# Patient Record
Sex: FEMALE | Race: WHITE | Employment: OTHER | URBAN - NONMETROPOLITAN AREA
[De-identification: names, ages, dates, MRNs, and addresses within clinical notes are randomized per-mention and may not be internally consistent; named-entity substitution may affect disease eponyms.]

---

## 2017-02-24 DIAGNOSIS — R11.0 NAUSEA IN ADULT: ICD-10-CM

## 2017-02-24 DIAGNOSIS — R60.0 BILATERAL EDEMA OF LOWER EXTREMITY: ICD-10-CM

## 2017-02-24 DIAGNOSIS — I10 ESSENTIAL HYPERTENSION: ICD-10-CM

## 2017-02-24 DIAGNOSIS — E78.2 MIXED HYPERLIPIDEMIA: Primary | ICD-10-CM

## 2017-02-27 RX ORDER — POTASSIUM CHLORIDE 1500 MG/1
TABLET, EXTENDED RELEASE ORAL
Qty: 30 TABLET | Refills: 5 | Status: SHIPPED | OUTPATIENT
Start: 2017-02-27 | End: 2017-09-11 | Stop reason: SDUPTHER

## 2017-02-27 RX ORDER — ATORVASTATIN CALCIUM 40 MG/1
TABLET, FILM COATED ORAL
Qty: 30 TABLET | Refills: 5 | Status: SHIPPED | OUTPATIENT
Start: 2017-02-27 | End: 2017-09-11 | Stop reason: SDUPTHER

## 2017-02-27 RX ORDER — HYDROCHLOROTHIAZIDE 25 MG/1
TABLET ORAL
Qty: 30 TABLET | Refills: 5 | Status: SHIPPED | OUTPATIENT
Start: 2017-02-27 | End: 2017-09-11 | Stop reason: SDUPTHER

## 2017-02-27 RX ORDER — ONDANSETRON 4 MG/1
TABLET, FILM COATED ORAL
Qty: 40 TABLET | Refills: 0 | Status: SHIPPED | OUTPATIENT
Start: 2017-02-27 | End: 2017-10-06 | Stop reason: SDUPTHER

## 2017-03-20 DIAGNOSIS — K21.9 GASTROESOPHAGEAL REFLUX DISEASE WITHOUT ESOPHAGITIS: ICD-10-CM

## 2017-03-20 DIAGNOSIS — K44.9 HIATAL HERNIA: ICD-10-CM

## 2017-03-20 RX ORDER — RANITIDINE 150 MG/1
150 TABLET ORAL 2 TIMES DAILY
Qty: 60 TABLET | Refills: 5 | Status: SHIPPED | OUTPATIENT
Start: 2017-03-20 | End: 2017-10-06 | Stop reason: DRUGHIGH

## 2017-03-31 ENCOUNTER — HOSPITAL ENCOUNTER (OUTPATIENT)
Dept: GENERAL RADIOLOGY | Age: 47
Discharge: HOME | End: 2017-03-31
Payer: MEDICARE

## 2017-03-31 ENCOUNTER — HOSPITAL ENCOUNTER (OUTPATIENT)
Age: 47
Discharge: HOME | End: 2017-03-31
Payer: MEDICARE

## 2017-03-31 ENCOUNTER — OFFICE VISIT (OUTPATIENT)
Dept: FAMILY MEDICINE CLINIC | Age: 47
End: 2017-03-31
Payer: MEDICARE

## 2017-03-31 VITALS
BODY MASS INDEX: 48.21 KG/M2 | SYSTOLIC BLOOD PRESSURE: 132 MMHG | HEIGHT: 62 IN | WEIGHT: 262 LBS | DIASTOLIC BLOOD PRESSURE: 82 MMHG | HEART RATE: 76 BPM

## 2017-03-31 DIAGNOSIS — R22.31 MASS OF WRIST, RIGHT: ICD-10-CM

## 2017-03-31 DIAGNOSIS — G43.809 OTHER MIGRAINE WITHOUT STATUS MIGRAINOSUS, NOT INTRACTABLE: Primary | ICD-10-CM

## 2017-03-31 DIAGNOSIS — E78.2 MIXED HYPERCHOLESTEROLEMIA AND HYPERTRIGLYCERIDEMIA: ICD-10-CM

## 2017-03-31 DIAGNOSIS — R52 PAIN: ICD-10-CM

## 2017-03-31 DIAGNOSIS — K44.9 HIATAL HERNIA: ICD-10-CM

## 2017-03-31 DIAGNOSIS — E66.01 MORBID OBESITY DUE TO EXCESS CALORIES (HCC): ICD-10-CM

## 2017-03-31 DIAGNOSIS — K21.9 GASTROESOPHAGEAL REFLUX DISEASE WITHOUT ESOPHAGITIS: ICD-10-CM

## 2017-03-31 PROCEDURE — G8417 CALC BMI ABV UP PARAM F/U: HCPCS | Performed by: INTERNAL MEDICINE

## 2017-03-31 PROCEDURE — 73110 X-RAY EXAM OF WRIST: CPT

## 2017-03-31 PROCEDURE — 1036F TOBACCO NON-USER: CPT | Performed by: INTERNAL MEDICINE

## 2017-03-31 PROCEDURE — G8427 DOCREV CUR MEDS BY ELIG CLIN: HCPCS | Performed by: INTERNAL MEDICINE

## 2017-03-31 PROCEDURE — G8484 FLU IMMUNIZE NO ADMIN: HCPCS | Performed by: INTERNAL MEDICINE

## 2017-03-31 PROCEDURE — 99214 OFFICE O/P EST MOD 30 MIN: CPT | Performed by: INTERNAL MEDICINE

## 2017-03-31 RX ORDER — PHENTERMINE HYDROCHLORIDE 37.5 MG/1
37.5 CAPSULE ORAL EVERY MORNING
Qty: 30 CAPSULE | Refills: 0 | Status: SHIPPED | OUTPATIENT
Start: 2017-03-31 | End: 2017-04-25 | Stop reason: SDUPTHER

## 2017-03-31 ASSESSMENT — ENCOUNTER SYMPTOMS
SORE THROAT: 0
DIARRHEA: 0
RHINORRHEA: 0
CHEST TIGHTNESS: 0
BLOOD IN STOOL: 0
NAUSEA: 0
COUGH: 0
ABDOMINAL PAIN: 0
SHORTNESS OF BREATH: 0
CONSTIPATION: 0

## 2017-04-25 ENCOUNTER — OFFICE VISIT (OUTPATIENT)
Dept: FAMILY MEDICINE CLINIC | Age: 47
End: 2017-04-25
Payer: MEDICARE

## 2017-04-25 VITALS
DIASTOLIC BLOOD PRESSURE: 74 MMHG | WEIGHT: 254 LBS | HEIGHT: 62 IN | HEART RATE: 78 BPM | BODY MASS INDEX: 46.74 KG/M2 | SYSTOLIC BLOOD PRESSURE: 122 MMHG

## 2017-04-25 DIAGNOSIS — E66.01 MORBID OBESITY DUE TO EXCESS CALORIES (HCC): Primary | ICD-10-CM

## 2017-04-25 DIAGNOSIS — R14.0 ABDOMINAL BLOATING: ICD-10-CM

## 2017-04-25 LAB
BILIRUBIN, POC: NORMAL
BLOOD URINE, POC: NORMAL
CLARITY, POC: CLEAR
COLOR, POC: NORMAL
GLUCOSE URINE, POC: NORMAL
KETONES, POC: NORMAL
LEUKOCYTE EST, POC: NORMAL
NITRITE, POC: NORMAL
PH, POC: 6
PROTEIN, POC: NORMAL
SPECIFIC GRAVITY, POC: 1.02
UROBILINOGEN, POC: 0.2

## 2017-04-25 PROCEDURE — 81002 URINALYSIS NONAUTO W/O SCOPE: CPT | Performed by: INTERNAL MEDICINE

## 2017-04-25 PROCEDURE — 99213 OFFICE O/P EST LOW 20 MIN: CPT | Performed by: INTERNAL MEDICINE

## 2017-04-25 PROCEDURE — G8427 DOCREV CUR MEDS BY ELIG CLIN: HCPCS | Performed by: INTERNAL MEDICINE

## 2017-04-25 PROCEDURE — G8417 CALC BMI ABV UP PARAM F/U: HCPCS | Performed by: INTERNAL MEDICINE

## 2017-04-25 PROCEDURE — 1036F TOBACCO NON-USER: CPT | Performed by: INTERNAL MEDICINE

## 2017-04-25 RX ORDER — PHENTERMINE HYDROCHLORIDE 37.5 MG/1
37.5 CAPSULE ORAL EVERY MORNING
Qty: 30 CAPSULE | Refills: 0 | Status: SHIPPED | OUTPATIENT
Start: 2017-04-25 | End: 2017-05-25 | Stop reason: SDUPTHER

## 2017-04-25 ASSESSMENT — ENCOUNTER SYMPTOMS
COUGH: 0
DIARRHEA: 0
NAUSEA: 0
CONSTIPATION: 0
RHINORRHEA: 0
ABDOMINAL PAIN: 0
SORE THROAT: 0
BLOOD IN STOOL: 0
CHEST TIGHTNESS: 0
SHORTNESS OF BREATH: 0

## 2017-05-25 ENCOUNTER — OFFICE VISIT (OUTPATIENT)
Dept: FAMILY MEDICINE CLINIC | Age: 47
End: 2017-05-25
Payer: MEDICARE

## 2017-05-25 VITALS
HEIGHT: 62 IN | HEART RATE: 78 BPM | BODY MASS INDEX: 46.19 KG/M2 | WEIGHT: 251 LBS | DIASTOLIC BLOOD PRESSURE: 74 MMHG | SYSTOLIC BLOOD PRESSURE: 124 MMHG

## 2017-05-25 DIAGNOSIS — E66.01 MORBID OBESITY DUE TO EXCESS CALORIES (HCC): ICD-10-CM

## 2017-05-25 PROCEDURE — 1036F TOBACCO NON-USER: CPT | Performed by: INTERNAL MEDICINE

## 2017-05-25 PROCEDURE — 99213 OFFICE O/P EST LOW 20 MIN: CPT | Performed by: INTERNAL MEDICINE

## 2017-05-25 PROCEDURE — G8427 DOCREV CUR MEDS BY ELIG CLIN: HCPCS | Performed by: INTERNAL MEDICINE

## 2017-05-25 PROCEDURE — G8417 CALC BMI ABV UP PARAM F/U: HCPCS | Performed by: INTERNAL MEDICINE

## 2017-05-25 RX ORDER — PHENTERMINE HYDROCHLORIDE 37.5 MG/1
37.5 CAPSULE ORAL EVERY MORNING
Qty: 30 CAPSULE | Refills: 0 | Status: SHIPPED | OUTPATIENT
Start: 2017-05-25 | End: 2017-06-24

## 2017-05-25 ASSESSMENT — PATIENT HEALTH QUESTIONNAIRE - PHQ9
SUM OF ALL RESPONSES TO PHQ9 QUESTIONS 1 & 2: 2
2. FEELING DOWN, DEPRESSED OR HOPELESS: 1
SUM OF ALL RESPONSES TO PHQ QUESTIONS 1-9: 2
1. LITTLE INTEREST OR PLEASURE IN DOING THINGS: 1

## 2017-05-25 ASSESSMENT — ENCOUNTER SYMPTOMS
RHINORRHEA: 0
COUGH: 0
BLOOD IN STOOL: 0
SORE THROAT: 0
CONSTIPATION: 0
CHEST TIGHTNESS: 0
SHORTNESS OF BREATH: 0
ABDOMINAL PAIN: 0
DIARRHEA: 0
NAUSEA: 0

## 2017-08-04 ENCOUNTER — TELEPHONE (OUTPATIENT)
Dept: FAMILY MEDICINE CLINIC | Age: 47
End: 2017-08-04

## 2017-08-04 DIAGNOSIS — J40 BRONCHITIS: ICD-10-CM

## 2017-08-04 RX ORDER — AZITHROMYCIN 250 MG/1
TABLET, FILM COATED ORAL
Qty: 1 PACKET | Refills: 0 | Status: SHIPPED | OUTPATIENT
Start: 2017-08-04 | End: 2017-08-14

## 2017-08-14 RX ORDER — SERTRALINE HYDROCHLORIDE 100 MG/1
200 TABLET, FILM COATED ORAL DAILY
Qty: 30 TABLET | Refills: 0 | Status: SHIPPED | OUTPATIENT
Start: 2017-08-14 | End: 2017-08-28 | Stop reason: SDUPTHER

## 2017-08-28 RX ORDER — SERTRALINE HYDROCHLORIDE 100 MG/1
200 TABLET, FILM COATED ORAL DAILY
Qty: 30 TABLET | Refills: 0 | Status: SHIPPED | OUTPATIENT
Start: 2017-08-28 | End: 2017-09-11 | Stop reason: SDUPTHER

## 2017-09-11 DIAGNOSIS — I10 ESSENTIAL HYPERTENSION: ICD-10-CM

## 2017-09-11 DIAGNOSIS — R60.0 BILATERAL EDEMA OF LOWER EXTREMITY: ICD-10-CM

## 2017-09-11 DIAGNOSIS — E78.2 MIXED HYPERLIPIDEMIA: ICD-10-CM

## 2017-09-12 RX ORDER — ATORVASTATIN CALCIUM 40 MG/1
TABLET, FILM COATED ORAL
Qty: 30 TABLET | Refills: 5 | Status: SHIPPED | OUTPATIENT
Start: 2017-09-12 | End: 2018-03-19 | Stop reason: SDUPTHER

## 2017-09-12 RX ORDER — SERTRALINE HYDROCHLORIDE 100 MG/1
TABLET, FILM COATED ORAL
Qty: 30 TABLET | Refills: 5 | Status: SHIPPED | OUTPATIENT
Start: 2017-09-12 | End: 2018-07-05 | Stop reason: SDUPTHER

## 2017-09-12 RX ORDER — HYDROCHLOROTHIAZIDE 25 MG/1
TABLET ORAL
Qty: 30 TABLET | Refills: 5 | Status: SHIPPED | OUTPATIENT
Start: 2017-09-12 | End: 2018-05-06 | Stop reason: SDUPTHER

## 2017-09-12 RX ORDER — POTASSIUM CHLORIDE 1500 MG/1
TABLET, EXTENDED RELEASE ORAL
Qty: 30 TABLET | Refills: 5 | Status: SHIPPED | OUTPATIENT
Start: 2017-09-12 | End: 2018-05-06 | Stop reason: SDUPTHER

## 2017-10-06 ENCOUNTER — HOSPITAL ENCOUNTER (OUTPATIENT)
Age: 47
Discharge: HOME | End: 2017-10-06
Payer: MEDICARE

## 2017-10-06 ENCOUNTER — OFFICE VISIT (OUTPATIENT)
Dept: FAMILY MEDICINE CLINIC | Age: 47
End: 2017-10-06
Payer: MEDICARE

## 2017-10-06 VITALS
SYSTOLIC BLOOD PRESSURE: 124 MMHG | WEIGHT: 262 LBS | HEART RATE: 70 BPM | HEIGHT: 62 IN | DIASTOLIC BLOOD PRESSURE: 74 MMHG | BODY MASS INDEX: 48.21 KG/M2

## 2017-10-06 DIAGNOSIS — E78.2 MIXED HYPERCHOLESTEROLEMIA AND HYPERTRIGLYCERIDEMIA: ICD-10-CM

## 2017-10-06 DIAGNOSIS — R11.0 NAUSEA IN ADULT: ICD-10-CM

## 2017-10-06 DIAGNOSIS — K44.9 HIATAL HERNIA: ICD-10-CM

## 2017-10-06 DIAGNOSIS — R10.13 EPIGASTRIC ABDOMINAL PAIN: Primary | ICD-10-CM

## 2017-10-06 DIAGNOSIS — F41.8 DEPRESSION WITH ANXIETY: ICD-10-CM

## 2017-10-06 DIAGNOSIS — K21.9 GASTROESOPHAGEAL REFLUX DISEASE WITHOUT ESOPHAGITIS: ICD-10-CM

## 2017-10-06 LAB
ALBUMIN SERPL-MCNC: 3.6 G/DL (ref 3.5–5.2)
ALBUMIN/GLOBULIN RATIO: ABNORMAL (ref 1–2.5)
ALP BLD-CCNC: 116 U/L (ref 35–104)
ALT SERPL-CCNC: 17 U/L (ref 5–33)
ANION GAP SERPL CALCULATED.3IONS-SCNC: 11 MMOL/L (ref 9–17)
AST SERPL-CCNC: 24 U/L
BILIRUB SERPL-MCNC: 0.43 MG/DL (ref 0.3–1.2)
BUN BLDV-MCNC: 10 MG/DL (ref 6–20)
BUN/CREAT BLD: 17 (ref 9–20)
CALCIUM SERPL-MCNC: 9 MG/DL (ref 8.6–10.4)
CHLORIDE BLD-SCNC: 103 MMOL/L (ref 98–107)
CHOLESTEROL/HDL RATIO: 2.9
CHOLESTEROL: 181 MG/DL
CO2: 26 MMOL/L (ref 20–31)
CREAT SERPL-MCNC: 0.59 MG/DL (ref 0.5–0.9)
GFR AFRICAN AMERICAN: >60 ML/MIN
GFR NON-AFRICAN AMERICAN: >60 ML/MIN
GFR SERPL CREATININE-BSD FRML MDRD: ABNORMAL ML/MIN/{1.73_M2}
GFR SERPL CREATININE-BSD FRML MDRD: ABNORMAL ML/MIN/{1.73_M2}
GLUCOSE BLD-MCNC: 95 MG/DL (ref 70–99)
HDLC SERPL-MCNC: 62 MG/DL
LDL CHOLESTEROL: 92 MG/DL (ref 0–130)
PATIENT FASTING?: YES
POTASSIUM SERPL-SCNC: 4 MMOL/L (ref 3.7–5.3)
SODIUM BLD-SCNC: 140 MMOL/L (ref 135–144)
TOTAL PROTEIN: 7.7 G/DL (ref 6.4–8.3)
TRIGL SERPL-MCNC: 133 MG/DL
VLDLC SERPL CALC-MCNC: NORMAL MG/DL (ref 1–30)

## 2017-10-06 PROCEDURE — 80061 LIPID PANEL: CPT

## 2017-10-06 PROCEDURE — G8427 DOCREV CUR MEDS BY ELIG CLIN: HCPCS | Performed by: INTERNAL MEDICINE

## 2017-10-06 PROCEDURE — G8484 FLU IMMUNIZE NO ADMIN: HCPCS | Performed by: INTERNAL MEDICINE

## 2017-10-06 PROCEDURE — 99214 OFFICE O/P EST MOD 30 MIN: CPT | Performed by: INTERNAL MEDICINE

## 2017-10-06 PROCEDURE — 80053 COMPREHEN METABOLIC PANEL: CPT

## 2017-10-06 PROCEDURE — 36415 COLL VENOUS BLD VENIPUNCTURE: CPT

## 2017-10-06 PROCEDURE — G8417 CALC BMI ABV UP PARAM F/U: HCPCS | Performed by: INTERNAL MEDICINE

## 2017-10-06 PROCEDURE — 1036F TOBACCO NON-USER: CPT | Performed by: INTERNAL MEDICINE

## 2017-10-06 RX ORDER — RANITIDINE 150 MG/1
150 TABLET ORAL NIGHTLY
Qty: 30 TABLET | Refills: 5 | Status: SHIPPED
Start: 2017-10-06 | End: 2017-12-20

## 2017-10-06 RX ORDER — PANTOPRAZOLE SODIUM 40 MG/1
40 TABLET, DELAYED RELEASE ORAL DAILY
Qty: 30 TABLET | Refills: 3 | Status: SHIPPED | OUTPATIENT
Start: 2017-10-06 | End: 2018-03-19 | Stop reason: SDUPTHER

## 2017-10-06 RX ORDER — ONDANSETRON 4 MG/1
TABLET, FILM COATED ORAL
Qty: 40 TABLET | Refills: 0 | Status: SHIPPED | OUTPATIENT
Start: 2017-10-06 | End: 2018-07-05 | Stop reason: SDUPTHER

## 2017-10-06 ASSESSMENT — ENCOUNTER SYMPTOMS
CHEST TIGHTNESS: 0
SORE THROAT: 0
ABDOMINAL PAIN: 1
SHORTNESS OF BREATH: 0
BLOOD IN STOOL: 0
DIARRHEA: 0
COUGH: 0
CONSTIPATION: 0
RHINORRHEA: 0
NAUSEA: 0

## 2017-10-06 NOTE — PROGRESS NOTES
Subjective:      Patient ID: Johan Lorenzo is a 52 y.o. female. HPI Comments: Tanisha Luque presents for a check up on her medical conditions. Tanisha Luque denies new problems. Medications were reviewed with Tanisha Luque, she is  tolerating the medication. Bowels are not regular. Urgency with BMs. There has not been rectal bleeding. Tanisha Luque denies urinary complications, the urine stream is good. Tanisha Luque denies chest pain and denies increasing shortness of breath. Nel Rodriguez feels she is having more problems with indigestion. This has been worsening lately. She feels that certain foods make it worse such as oily type food and fatty foods. She does have tenderness over the epigastric area. Depression / anxiety fluctuates according to dealing with her father. She is following with Dr. Beatriz Hart and has not had her medication adjusted recently. Hypertension   This is a chronic problem. The current episode started more than 1 year ago. The problem is unchanged. The problem is controlled. Pertinent negatives include no chest pain, neck pain, palpitations or shortness of breath. Past treatments include diuretics. The current treatment provides significant improvement. There are no compliance problems. There is no history of angina. Hyperlipidemia   This is a chronic problem. The current episode started more than 1 year ago. The problem is controlled. Recent lipid tests were reviewed and are normal. Pertinent negatives include no chest pain, myalgias or shortness of breath. Current antihyperlipidemic treatment includes statins. The current treatment provides significant improvement of lipids. There are no compliance problems. Review of Systems   Constitutional: Negative. HENT: Negative for congestion, ear pain, rhinorrhea, sneezing and sore throat. Eyes: Negative for visual disturbance. Respiratory: Negative for cough, chest tightness and shortness of breath.     Cardiovascular: Negative for chest pain and palpitations. Gastrointestinal: Positive for abdominal pain. Negative for blood in stool, constipation, diarrhea and nausea. Genitourinary: Negative for difficulty urinating, dysuria, frequency, menstrual problem and urgency. Musculoskeletal: Negative for arthralgias, joint swelling, myalgias and neck pain. Skin: Negative. Neurological: Negative for syncope. Psychiatric/Behavioral: Negative. Objective:   Physical Exam   Constitutional: She appears well-developed and well-nourished. She is cooperative. Non-toxic appearance. She does not have a sickly appearance. She does not appear ill. HENT:   Head: Atraumatic. Right Ear: Hearing normal.   Left Ear: Hearing normal.   Nose: Nose normal.   Eyes: Conjunctivae are normal.   Neck: Trachea normal and normal range of motion. Neck supple. Carotid bruit is not present. No thyroid mass present. Cardiovascular: Normal rate, regular rhythm, S1 normal, S2 normal and normal heart sounds. No murmur heard. Pulmonary/Chest: Effort normal and breath sounds normal. No respiratory distress. Abdominal: Bowel sounds are normal. There is tenderness. Obese  Pain over the epigastric area with palpation. Musculoskeletal: Normal range of motion. She exhibits no edema. Lymphadenopathy:     She has no cervical adenopathy. Neurological: She is alert. She is not disoriented. Skin: Skin is warm and dry. No rash noted. No pallor. Psychiatric: She has a normal mood and affect. Her speech is normal and behavior is normal. Judgment and thought content normal. Cognition and memory are normal.   Nursing note and vitals reviewed. Assessment:      1. Epigastric abdominal pain  pantoprazole (PROTONIX) 40 MG tablet   2. Nausea in adult  ondansetron (ZOFRAN) 4 MG tablet   3. Depression with anxiety     4. Gastroesophageal reflux disease without esophagitis  ranitidine (ZANTAC) 150 MG tablet    pantoprazole (PROTONIX) 40 MG tablet   5.  Mixed hypercholesterolemia and hypertriglyceridemia     6. Urethral stenosis     7. Hiatal hernia  ranitidine (ZANTAC) 150 MG tablet           Plan:      1. Nausea in adult  Use Zofran as needed. - ondansetron (ZOFRAN) 4 MG tablet; TAKE 1 TABLET BY MOUTH EVERY 8 HOURS AS NEEDED FOR NAUSEA and FOR VOMITING  Dispense: 40 tablet; Refill: 0    2. Epigastric abdominal pain  Start on Protonix 40 mg daily. Take Zantac at bedtime. Return if symptoms continue or worsen. - pantoprazole (PROTONIX) 40 MG tablet; Take 1 tablet by mouth daily  Dispense: 30 tablet; Refill: 3    3. Depression with anxiety  Continue to follow with Dr. Gray Jauregui. 4. Gastroesophageal reflux disease without esophagitis    - ranitidine (ZANTAC) 150 MG tablet; Take 1 tablet by mouth nightly  Dispense: 30 tablet; Refill: 5  - pantoprazole (PROTONIX) 40 MG tablet; Take 1 tablet by mouth daily  Dispense: 30 tablet; Refill: 3    5. Mixed hypercholesterolemia and hypertriglyceridemia  Continue the current medication. Follow up on labs when available. 6. Urethral stenosis  Follow up with Urology. 7. Hiatal hernia  Continue the current medication. - ranitidine (ZANTAC) 150 MG tablet; Take 1 tablet by mouth nightly  Dispense: 30 tablet; Refill: 5    Argelia Zacarias was instructed to follow up in the clinic in 6 months for check up or as needed with any medical issues.

## 2017-10-06 NOTE — PROGRESS NOTES
Chronic Disease Visit Information    BP Readings from Last 3 Encounters:   05/25/17 124/74   04/25/17 122/74   03/31/17 132/82          LDL Cholesterol (mg/dL)   Date Value   02/07/2017 113     HDL (mg/dL)   Date Value   02/07/2017 61     BUN (mg/dL)   Date Value   02/07/2017 7     CREATININE (mg/dL)   Date Value   02/07/2017 0.56     Glucose (mg/dL)   Date Value   02/07/2017 91            Have you changed or started any medications since your last visit including any over-the-counter medicines, vitamins, or herbal medicines? no   Are you having any side effects from any of your medications? -  no  Have you stopped taking any of your medications? Is so, why? -  no    Have you seen any other physician or provider since your last visit? Yes - Records Obtained  Have you had any other diagnostic tests since your last visit? No  Have you been seen in the emergency room and/or had an admission to a hospital since we last saw you? No  Have you had your annual diabetic retinal (eye) exam? Yes - Records Requested  Have you had your routine dental cleaning in the past 6 months? no    Have you activated your [employer] account? If not, what are your barriers?  Yes     Patient Care Team:  Eri Grant MD as PCP - General (Internal Medicine)  Alfred Cuellar (Psychiatry)  Michael Sanchez NP         Medical History Review  Past Medical, Family, and Social History reviewed and does contribute to the patient presenting condition    Health Maintenance   Topic Date Due    Flu vaccine (1) 09/01/2017    Cervical cancer screen  05/04/2020    Lipid screen  02/07/2022    DTaP/Tdap/Td vaccine (2 - Td) 05/28/2025    HIV screen  Completed

## 2017-12-19 DIAGNOSIS — K44.9 HIATAL HERNIA: ICD-10-CM

## 2017-12-19 DIAGNOSIS — K21.9 GASTROESOPHAGEAL REFLUX DISEASE WITHOUT ESOPHAGITIS: ICD-10-CM

## 2017-12-20 RX ORDER — RANITIDINE 150 MG/1
TABLET ORAL
Qty: 60 TABLET | Refills: 3 | Status: SHIPPED | OUTPATIENT
Start: 2017-12-20 | End: 2018-05-15 | Stop reason: SDUPTHER

## 2018-03-19 DIAGNOSIS — K21.9 GASTROESOPHAGEAL REFLUX DISEASE WITHOUT ESOPHAGITIS: ICD-10-CM

## 2018-03-19 DIAGNOSIS — E78.2 MIXED HYPERLIPIDEMIA: ICD-10-CM

## 2018-03-19 DIAGNOSIS — R10.13 EPIGASTRIC ABDOMINAL PAIN: ICD-10-CM

## 2018-03-19 RX ORDER — PANTOPRAZOLE SODIUM 40 MG/1
40 TABLET, DELAYED RELEASE ORAL DAILY
Qty: 30 TABLET | Refills: 2 | Status: SHIPPED | OUTPATIENT
Start: 2018-03-19 | End: 2018-05-15 | Stop reason: SDUPTHER

## 2018-03-19 RX ORDER — ATORVASTATIN CALCIUM 40 MG/1
TABLET, FILM COATED ORAL
Qty: 30 TABLET | Refills: 2 | Status: SHIPPED | OUTPATIENT
Start: 2018-03-19 | End: 2018-05-15 | Stop reason: SDUPTHER

## 2018-03-19 NOTE — TELEPHONE ENCOUNTER
Health Maintenance   Topic Date Due    Flu vaccine (1) 09/01/2017    Potassium monitoring  10/06/2018    Creatinine monitoring  10/06/2018    Cervical cancer screen  05/04/2020    Lipid screen  10/06/2022    DTaP/Tdap/Td vaccine (2 - Td) 05/28/2025    HIV screen  Completed             (applicable per patient's age: Cancer Screenings, Depression Screening, Fall Risk Screening, Immunizations)    LDL Cholesterol (mg/dL)   Date Value   10/06/2017 92     AST (U/L)   Date Value   10/06/2017 24     ALT (U/L)   Date Value   10/06/2017 17     BUN (mg/dL)   Date Value   10/06/2017 10      (goal A1C is < 7)   (goal LDL is <100) need 30-50% reduction from baseline     BP Readings from Last 3 Encounters:   10/06/17 124/74   05/25/17 124/74   04/25/17 122/74    (goal /80)      All Future Testing planned in [facility]:  Lab Frequency Next Occurrence   XR WRIST BILATERAL PA AND LATERAL Once 04/30/2017       Next Visit Date:  Future Appointments  Date Time Provider Sheldon Berry   4/10/2018 2:00 PM MD Chaka Barnett [facility] [address] [facility]            Patient Active Problem List:     Mixed hypercholesterolemia and hypertriglyceridemia     Anxiety     Depression     Urinary urgency     Hiatal hernia     Symptomatic cholelithiasis     Renal colic on right side     Gastroesophageal reflux disease without esophagitis     Urethral stenosis

## 2018-03-26 ENCOUNTER — TELEPHONE (OUTPATIENT)
Dept: FAMILY MEDICINE CLINIC | Age: 48
End: 2018-03-26

## 2018-03-26 NOTE — TELEPHONE ENCOUNTER
Pt called stating she can't handle the stress. They are doing further testing on her dad for cancer and said if it comes back positive that he doesn't have long to live and she states she needs xanax to help her get thru this. She states you have given it to her before.             Health Maintenance   Topic Date Due    Flu vaccine (1) 09/01/2017    Potassium monitoring  10/06/2018    Creatinine monitoring  10/06/2018    Cervical cancer screen  05/04/2020    Lipid screen  10/06/2022    DTaP/Tdap/Td vaccine (2 - Td) 05/28/2025    HIV screen  Completed             (applicable per patient's age: Cancer Screenings, Depression Screening, Fall Risk Screening, Immunizations)    LDL Cholesterol (mg/dL)   Date Value   10/06/2017 92     AST (U/L)   Date Value   10/06/2017 24     ALT (U/L)   Date Value   10/06/2017 17     BUN (mg/dL)   Date Value   10/06/2017 10      (goal A1C is < 7)   (goal LDL is <100) need 30-50% reduction from baseline     BP Readings from Last 3 Encounters:   10/06/17 124/74   05/25/17 124/74   04/25/17 122/74    (goal /80)      All Future Testing planned in [facility]:  Lab Frequency Next Occurrence   XR WRIST BILATERAL PA AND LATERAL Once 04/30/2017       Next Visit Date:  Future Appointments  Date Time Provider Sheldon Berry   4/10/2018 2:00 PM MD Sandra Salvador [facility] [address] [facility]            Patient Active Problem List:     Mixed hypercholesterolemia and hypertriglyceridemia     Anxiety     Depression     Urinary urgency     Hiatal hernia     Symptomatic cholelithiasis     Renal colic on right side     Gastroesophageal reflux disease without esophagitis     Urethral stenosis

## 2018-05-06 DIAGNOSIS — R60.0 BILATERAL EDEMA OF LOWER EXTREMITY: ICD-10-CM

## 2018-05-06 DIAGNOSIS — I10 ESSENTIAL HYPERTENSION: ICD-10-CM

## 2018-05-07 RX ORDER — HYDROCHLOROTHIAZIDE 25 MG/1
TABLET ORAL
Qty: 30 TABLET | Refills: 0 | Status: SHIPPED | OUTPATIENT
Start: 2018-05-07 | End: 2018-05-15 | Stop reason: SDUPTHER

## 2018-05-07 RX ORDER — POTASSIUM CHLORIDE 20 MEQ/1
TABLET, EXTENDED RELEASE ORAL
Qty: 30 TABLET | Refills: 0 | Status: SHIPPED | OUTPATIENT
Start: 2018-05-07 | End: 2018-05-15 | Stop reason: SDUPTHER

## 2018-05-15 ENCOUNTER — OFFICE VISIT (OUTPATIENT)
Dept: FAMILY MEDICINE CLINIC | Age: 48
End: 2018-05-15
Payer: MEDICARE

## 2018-05-15 VITALS
DIASTOLIC BLOOD PRESSURE: 80 MMHG | HEIGHT: 62 IN | BODY MASS INDEX: 49.5 KG/M2 | WEIGHT: 269 LBS | SYSTOLIC BLOOD PRESSURE: 138 MMHG | HEART RATE: 72 BPM

## 2018-05-15 DIAGNOSIS — F32.5 MAJOR DEPRESSIVE DISORDER WITH SINGLE EPISODE, IN FULL REMISSION (HCC): ICD-10-CM

## 2018-05-15 DIAGNOSIS — K44.9 HIATAL HERNIA: ICD-10-CM

## 2018-05-15 DIAGNOSIS — R10.13 EPIGASTRIC ABDOMINAL PAIN: ICD-10-CM

## 2018-05-15 DIAGNOSIS — R60.0 BILATERAL EDEMA OF LOWER EXTREMITY: ICD-10-CM

## 2018-05-15 DIAGNOSIS — E78.2 MIXED HYPERCHOLESTEROLEMIA AND HYPERTRIGLYCERIDEMIA: ICD-10-CM

## 2018-05-15 DIAGNOSIS — I10 ESSENTIAL HYPERTENSION: Primary | ICD-10-CM

## 2018-05-15 DIAGNOSIS — K21.9 GASTROESOPHAGEAL REFLUX DISEASE WITHOUT ESOPHAGITIS: ICD-10-CM

## 2018-05-15 PROCEDURE — G8417 CALC BMI ABV UP PARAM F/U: HCPCS | Performed by: INTERNAL MEDICINE

## 2018-05-15 PROCEDURE — 1036F TOBACCO NON-USER: CPT | Performed by: INTERNAL MEDICINE

## 2018-05-15 PROCEDURE — G8427 DOCREV CUR MEDS BY ELIG CLIN: HCPCS | Performed by: INTERNAL MEDICINE

## 2018-05-15 PROCEDURE — 99214 OFFICE O/P EST MOD 30 MIN: CPT | Performed by: INTERNAL MEDICINE

## 2018-05-15 RX ORDER — RANITIDINE 150 MG/1
150 TABLET ORAL 2 TIMES DAILY
Qty: 60 TABLET | Refills: 3 | Status: SHIPPED | OUTPATIENT
Start: 2018-05-15 | End: 2018-11-29

## 2018-05-15 RX ORDER — PHENTERMINE HYDROCHLORIDE 37.5 MG/1
37.5 CAPSULE ORAL EVERY MORNING
Qty: 30 CAPSULE | Refills: 0 | Status: SHIPPED | OUTPATIENT
Start: 2018-05-15 | End: 2018-06-15 | Stop reason: SDUPTHER

## 2018-05-15 RX ORDER — ATORVASTATIN CALCIUM 40 MG/1
40 TABLET, FILM COATED ORAL DAILY
Qty: 30 TABLET | Refills: 5 | Status: SHIPPED | OUTPATIENT
Start: 2018-05-15 | End: 2019-01-07

## 2018-05-15 RX ORDER — POTASSIUM CHLORIDE 20 MEQ/1
20 TABLET, EXTENDED RELEASE ORAL DAILY
Qty: 30 TABLET | Refills: 5 | Status: SHIPPED | OUTPATIENT
Start: 2018-05-15 | End: 2018-12-30 | Stop reason: SDUPTHER

## 2018-05-15 RX ORDER — HYDROCHLOROTHIAZIDE 25 MG/1
25 TABLET ORAL DAILY
Qty: 30 TABLET | Refills: 5 | Status: SHIPPED | OUTPATIENT
Start: 2018-05-15 | End: 2018-12-30 | Stop reason: SDUPTHER

## 2018-05-15 RX ORDER — PANTOPRAZOLE SODIUM 40 MG/1
40 TABLET, DELAYED RELEASE ORAL DAILY
Qty: 30 TABLET | Refills: 5 | Status: SHIPPED | OUTPATIENT
Start: 2018-05-15 | End: 2019-04-11 | Stop reason: SDUPTHER

## 2018-05-15 ASSESSMENT — ENCOUNTER SYMPTOMS
RHINORRHEA: 0
NAUSEA: 0
SORE THROAT: 0
CONSTIPATION: 0
CHEST TIGHTNESS: 0
ABDOMINAL PAIN: 0
BLOOD IN STOOL: 0
COUGH: 0
SHORTNESS OF BREATH: 0
DIARRHEA: 0

## 2018-06-15 ENCOUNTER — OFFICE VISIT (OUTPATIENT)
Dept: FAMILY MEDICINE CLINIC | Age: 48
End: 2018-06-15
Payer: MEDICARE

## 2018-06-15 VITALS
WEIGHT: 263 LBS | BODY MASS INDEX: 48.4 KG/M2 | HEIGHT: 62 IN | SYSTOLIC BLOOD PRESSURE: 134 MMHG | HEART RATE: 60 BPM | DIASTOLIC BLOOD PRESSURE: 76 MMHG

## 2018-06-15 PROCEDURE — G8427 DOCREV CUR MEDS BY ELIG CLIN: HCPCS | Performed by: INTERNAL MEDICINE

## 2018-06-15 PROCEDURE — 99213 OFFICE O/P EST LOW 20 MIN: CPT | Performed by: INTERNAL MEDICINE

## 2018-06-15 PROCEDURE — G8417 CALC BMI ABV UP PARAM F/U: HCPCS | Performed by: INTERNAL MEDICINE

## 2018-06-15 PROCEDURE — 1036F TOBACCO NON-USER: CPT | Performed by: INTERNAL MEDICINE

## 2018-06-15 RX ORDER — PHENTERMINE HYDROCHLORIDE 37.5 MG/1
37.5 CAPSULE ORAL EVERY MORNING
Qty: 30 CAPSULE | Refills: 0 | Status: SHIPPED | OUTPATIENT
Start: 2018-06-15 | End: 2018-07-17 | Stop reason: SDUPTHER

## 2018-06-15 RX ORDER — ALPRAZOLAM 0.5 MG/1
TABLET ORAL
Refills: 2 | COMMUNITY
Start: 2018-05-17

## 2018-06-15 ASSESSMENT — ENCOUNTER SYMPTOMS
CHEST TIGHTNESS: 0
ABDOMINAL PAIN: 0
COUGH: 0
NAUSEA: 0
BLOOD IN STOOL: 0
CONSTIPATION: 0
RHINORRHEA: 0
SHORTNESS OF BREATH: 0
DIARRHEA: 0
SORE THROAT: 0

## 2018-07-05 ENCOUNTER — TELEPHONE (OUTPATIENT)
Dept: FAMILY MEDICINE CLINIC | Age: 48
End: 2018-07-05

## 2018-07-05 DIAGNOSIS — B96.89 ACUTE BACTERIAL SINUSITIS: Primary | ICD-10-CM

## 2018-07-05 DIAGNOSIS — J01.90 ACUTE BACTERIAL SINUSITIS: Primary | ICD-10-CM

## 2018-07-05 DIAGNOSIS — R11.0 NAUSEA IN ADULT: ICD-10-CM

## 2018-07-05 RX ORDER — ONDANSETRON 4 MG/1
TABLET, FILM COATED ORAL
Qty: 40 TABLET | Refills: 0 | Status: SHIPPED | OUTPATIENT
Start: 2018-07-05 | End: 2019-01-08 | Stop reason: SDUPTHER

## 2018-07-05 RX ORDER — AZITHROMYCIN 250 MG/1
TABLET, FILM COATED ORAL
Qty: 1 PACKET | Refills: 0 | Status: SHIPPED | OUTPATIENT
Start: 2018-07-05 | End: 2018-07-15

## 2018-07-05 RX ORDER — SERTRALINE HYDROCHLORIDE 100 MG/1
TABLET, FILM COATED ORAL
Qty: 30 TABLET | Refills: 5 | Status: SHIPPED | OUTPATIENT
Start: 2018-07-05

## 2018-07-17 ENCOUNTER — OFFICE VISIT (OUTPATIENT)
Dept: FAMILY MEDICINE CLINIC | Age: 48
End: 2018-07-17
Payer: MEDICARE

## 2018-07-17 VITALS
WEIGHT: 264 LBS | SYSTOLIC BLOOD PRESSURE: 119 MMHG | HEART RATE: 81 BPM | DIASTOLIC BLOOD PRESSURE: 76 MMHG | BODY MASS INDEX: 48.58 KG/M2 | HEIGHT: 62 IN

## 2018-07-17 PROCEDURE — 99213 OFFICE O/P EST LOW 20 MIN: CPT | Performed by: INTERNAL MEDICINE

## 2018-07-17 PROCEDURE — G8417 CALC BMI ABV UP PARAM F/U: HCPCS | Performed by: INTERNAL MEDICINE

## 2018-07-17 PROCEDURE — G8427 DOCREV CUR MEDS BY ELIG CLIN: HCPCS | Performed by: INTERNAL MEDICINE

## 2018-07-17 PROCEDURE — 1036F TOBACCO NON-USER: CPT | Performed by: INTERNAL MEDICINE

## 2018-07-17 RX ORDER — PHENTERMINE HYDROCHLORIDE 37.5 MG/1
37.5 CAPSULE ORAL EVERY MORNING
Qty: 30 CAPSULE | Refills: 0 | Status: SHIPPED | OUTPATIENT
Start: 2018-07-17 | End: 2018-08-16

## 2018-07-17 ASSESSMENT — ENCOUNTER SYMPTOMS
RHINORRHEA: 0
SHORTNESS OF BREATH: 0
BLOOD IN STOOL: 0
ABDOMINAL PAIN: 0
CONSTIPATION: 0
COUGH: 0
DIARRHEA: 0
SORE THROAT: 0
NAUSEA: 0
CHEST TIGHTNESS: 0

## 2018-07-17 NOTE — PROGRESS NOTES
Visit Information    Have you changed or started any medications since your last visit including any over-the-counter medicines, vitamins, or herbal medicines? no   Are you having any side effects from any of your medications? -  no  Have you stopped taking any of your medications? Is so, why? -  no    Have you seen any other physician or provider since your last visit? No  Have you had any other diagnostic tests since your last visit? No  Have you been seen in the emergency room and/or had an admission to a hospital since we last saw you? No  Have you had your routine dental cleaning in the past 6 months? yes -     Have you activated your [employer] account? If not, what are your barriers?  Yes     Patient Care Team:  Sunshine Yousif MD as PCP - General (Internal Medicine)  Sunshine Yousif MD as PCP - S Attributed Provider  Chun Corrigan (Psychiatry)  DIOMEDES Randall - CNP    Medical History Review  Past Medical, Family, and Social History reviewed and does contribute to the patient presenting condition    Health Maintenance   Topic Date Due    Flu vaccine (1) 09/01/2018    Potassium monitoring  10/06/2018    Creatinine monitoring  10/06/2018    Cervical cancer screen  05/04/2020    Lipid screen  10/06/2022    DTaP/Tdap/Td vaccine (2 - Td) 05/28/2025    HIV screen  Completed

## 2018-07-17 NOTE — PROGRESS NOTES
Subjective:      Patient ID: Dee Crisostomo is a 52 y.o. female. Yazan Mcmahan has been taking Adipex for the past month. In the past month she has lost 1 pounds. This is the second month on the medication. Currently Yazan Mcmahan is performing 0 hours of exercise per week. Yazan Mcmahan is currently following calorie restricted diet. The adipex is tolerated well without side effects. She was sick this past month so she states she didn't watch he diet as well and didn't exercise. Review of Systems   Constitutional: Negative. HENT: Negative for congestion, ear pain, rhinorrhea, sneezing and sore throat. Eyes: Negative for visual disturbance. Respiratory: Negative for cough, chest tightness and shortness of breath. Cardiovascular: Negative for chest pain and palpitations. Gastrointestinal: Negative for abdominal pain, blood in stool, constipation, diarrhea and nausea. Genitourinary: Negative for difficulty urinating, dysuria, frequency, menstrual problem and urgency. Musculoskeletal: Negative for arthralgias, joint swelling, myalgias and neck pain. Skin: Negative. Neurological: Negative for syncope. Psychiatric/Behavioral: Negative. Objective:   Physical Exam   Constitutional: She appears well-developed and well-nourished. She is cooperative. Non-toxic appearance. She does not have a sickly appearance. She does not appear ill. HENT:   Head: Atraumatic. Right Ear: Hearing normal.   Left Ear: Hearing normal.   Nose: Nose normal.   Eyes: Conjunctivae are normal.   Neck: Trachea normal and normal range of motion. Neck supple. Carotid bruit is not present. No thyroid mass present. Cardiovascular: Normal rate, regular rhythm, S1 normal, S2 normal and normal heart sounds. No murmur heard. Pulmonary/Chest: Effort normal and breath sounds normal. No respiratory distress. Abdominal: Bowel sounds are normal. There is no tenderness.    obese   Musculoskeletal: Normal range of motion. She exhibits no edema. Lymphadenopathy:     She has no cervical adenopathy. Neurological: She is alert. She is not disoriented. Skin: Skin is warm and dry. No rash noted. No pallor. Psychiatric: She has a normal mood and affect. Her speech is normal and behavior is normal. Judgment and thought content normal. Cognition and memory are normal.   Nursing note and vitals reviewed. Assessment:       Diagnosis Orders   1. Class 3 obesity due to excess calories without serious comorbidity with body mass index (BMI) of 45.0 to 49.9 in adult ([facility])  phentermine (ADIPEX-P) 37.5 MG capsule           Plan:      1. Class 3 obesity due to excess calories without serious comorbidity with body mass index (BMI) of 45.0 to 49.9 in adult [facility])  Continue Adipex with wt check in 1 month with nursing.     - phentermine (ADIPEX-P) 37.5 MG capsule; Take 1 capsule by mouth every morning for 30 days. .  Dispense: 30 capsule;  Refill: 0

## 2018-07-17 NOTE — PATIENT INSTRUCTIONS
SURVEY:    You may be receiving a survey from [employer] regarding your visit today. Please complete the survey to enable us to provide the highest quality of care to you and your family. If you cannot score us a very good on any question, please call the office to discuss how we could of made your experience a very good one. Thank you. 1. Class 3 obesity due to excess calories without serious comorbidity with body mass index (BMI) of 45.0 to 49.9 in adult [facility])  Continue Adipex with wt check in 1 month with nursing.     - phentermine (ADIPEX-P) 37.5 MG capsule; Take 1 capsule by mouth every morning for 30 days. .  Dispense: 30 capsule;  Refill: 0

## 2018-08-16 ENCOUNTER — TELEPHONE (OUTPATIENT)
Dept: FAMILY MEDICINE CLINIC | Age: 48
End: 2018-08-16

## 2018-08-16 VITALS — WEIGHT: 260 LBS | BODY MASS INDEX: 47.55 KG/M2

## 2018-11-28 DIAGNOSIS — K21.9 GASTROESOPHAGEAL REFLUX DISEASE WITHOUT ESOPHAGITIS: ICD-10-CM

## 2018-11-28 DIAGNOSIS — K44.9 HIATAL HERNIA: ICD-10-CM

## 2018-11-29 RX ORDER — RANITIDINE 150 MG/1
TABLET ORAL
Qty: 60 TABLET | Refills: 5 | Status: SHIPPED | OUTPATIENT
Start: 2018-11-29 | End: 2019-06-11 | Stop reason: SDUPTHER

## 2018-11-29 NOTE — TELEPHONE ENCOUNTER
Health Maintenance   Topic Date Due    Flu vaccine (1) 09/01/2018    Potassium monitoring  10/06/2018    Creatinine monitoring  10/06/2018    Cervical cancer screen  05/04/2020    Lipid screen  10/06/2022    DTaP/Tdap/Td vaccine (2 - Td) 05/28/2025    HIV screen  Completed             (applicable per patient's age: Cancer Screenings, Depression Screening, Fall Risk Screening, Immunizations)    LDL Cholesterol (mg/dL)   Date Value   10/06/2017 92     AST (U/L)   Date Value   10/06/2017 24     ALT (U/L)   Date Value   10/06/2017 17     BUN (mg/dL)   Date Value   10/06/2017 10      (goal A1C is < 7)   (goal LDL is <100) need 30-50% reduction from baseline     BP Readings from Last 3 Encounters:   07/17/18 119/76   06/15/18 134/76   05/15/18 138/80    (goal /80)      All Future Testing planned in [facility]:  Lab Frequency Next Occurrence   Comprehensive Metabolic Panel Once 30/86/6442   Lipid Panel Once 11/15/2018       Next Visit Date:  Future Appointments  Date Time Provider Sheldon Berry   12/11/2018 9:00 AM Jennifer Montoya MD [facility] [address] [facility]            Patient Active Problem List:     Mixed hypercholesterolemia and hypertriglyceridemia     Anxiety     Depression     Urinary urgency     Hiatal hernia     Symptomatic cholelithiasis     Renal colic on right side     Gastroesophageal reflux disease without esophagitis     Urethral stenosis     Major depressive disorder with single episode, in full remission ([facility] [address]     Class 3 obesity due to excess calories without serious comorbidity with body mass index (BMI) of 45.0 to 49.9 in adult

## 2018-12-30 DIAGNOSIS — I10 ESSENTIAL HYPERTENSION: ICD-10-CM

## 2018-12-30 DIAGNOSIS — R60.0 BILATERAL EDEMA OF LOWER EXTREMITY: ICD-10-CM

## 2018-12-31 RX ORDER — POTASSIUM CHLORIDE 20 MEQ/1
20 TABLET, EXTENDED RELEASE ORAL DAILY
Qty: 30 TABLET | Refills: 5 | Status: SHIPPED | OUTPATIENT
Start: 2018-12-31 | End: 2019-07-09 | Stop reason: SDUPTHER

## 2018-12-31 RX ORDER — HYDROCHLOROTHIAZIDE 25 MG/1
25 TABLET ORAL DAILY
Qty: 30 TABLET | Refills: 5 | Status: SHIPPED | OUTPATIENT
Start: 2018-12-31 | End: 2019-08-06 | Stop reason: SDUPTHER

## 2019-01-07 ENCOUNTER — HOSPITAL ENCOUNTER (OUTPATIENT)
Age: 49
Discharge: HOME | End: 2019-01-07
Payer: MEDICARE

## 2019-01-07 DIAGNOSIS — E78.2 MIXED HYPERLIPIDEMIA: ICD-10-CM

## 2019-01-07 DIAGNOSIS — E78.2 MIXED HYPERCHOLESTEROLEMIA AND HYPERTRIGLYCERIDEMIA: ICD-10-CM

## 2019-01-07 DIAGNOSIS — I10 ESSENTIAL HYPERTENSION: ICD-10-CM

## 2019-01-07 LAB
ALBUMIN SERPL-MCNC: 4 G/DL (ref 3.5–5.2)
ALBUMIN/GLOBULIN RATIO: NORMAL (ref 1–2.5)
ALP BLD-CCNC: 96 U/L (ref 35–104)
ALT SERPL-CCNC: 7 U/L (ref 5–33)
ANION GAP SERPL CALCULATED.3IONS-SCNC: 10 MMOL/L (ref 9–17)
AST SERPL-CCNC: 16 U/L
BILIRUB SERPL-MCNC: 0.33 MG/DL (ref 0.3–1.2)
BUN BLDV-MCNC: 8 MG/DL (ref 6–20)
BUN/CREAT BLD: 12 (ref 9–20)
CALCIUM SERPL-MCNC: 9.4 MG/DL (ref 8.6–10.4)
CHLORIDE BLD-SCNC: 102 MMOL/L (ref 98–107)
CHOLESTEROL/HDL RATIO: 3.5
CHOLESTEROL: 246 MG/DL
CO2: 26 MMOL/L (ref 20–31)
CREAT SERPL-MCNC: 0.65 MG/DL (ref 0.5–0.9)
GFR AFRICAN AMERICAN: >60 ML/MIN
GFR NON-AFRICAN AMERICAN: >60 ML/MIN
GFR SERPL CREATININE-BSD FRML MDRD: NORMAL ML/MIN/{1.73_M2}
GFR SERPL CREATININE-BSD FRML MDRD: NORMAL ML/MIN/{1.73_M2}
GLUCOSE BLD-MCNC: 95 MG/DL (ref 70–99)
HDLC SERPL-MCNC: 70 MG/DL
LDL CHOLESTEROL: 144 MG/DL (ref 0–130)
PATIENT FASTING?: YES
POTASSIUM SERPL-SCNC: 4.8 MMOL/L (ref 3.7–5.3)
SODIUM BLD-SCNC: 138 MMOL/L (ref 135–144)
TOTAL PROTEIN: 7.9 G/DL (ref 6.4–8.3)
TRIGL SERPL-MCNC: 158 MG/DL
VLDLC SERPL CALC-MCNC: ABNORMAL MG/DL (ref 1–30)

## 2019-01-07 PROCEDURE — 80061 LIPID PANEL: CPT

## 2019-01-07 PROCEDURE — 80053 COMPREHEN METABOLIC PANEL: CPT

## 2019-01-07 PROCEDURE — 36415 COLL VENOUS BLD VENIPUNCTURE: CPT

## 2019-01-07 RX ORDER — ATORVASTATIN CALCIUM 40 MG/1
TABLET, FILM COATED ORAL
Qty: 30 TABLET | Refills: 5 | Status: SHIPPED | OUTPATIENT
Start: 2019-01-07 | End: 2019-01-08 | Stop reason: SDUPTHER

## 2019-01-08 ENCOUNTER — OFFICE VISIT (OUTPATIENT)
Dept: FAMILY MEDICINE CLINIC | Age: 49
End: 2019-01-08
Payer: MEDICARE

## 2019-01-08 VITALS
HEART RATE: 80 BPM | DIASTOLIC BLOOD PRESSURE: 81 MMHG | BODY MASS INDEX: 48.21 KG/M2 | HEIGHT: 62 IN | SYSTOLIC BLOOD PRESSURE: 130 MMHG | WEIGHT: 262 LBS

## 2019-01-08 DIAGNOSIS — E66.01 CLASS 3 SEVERE OBESITY DUE TO EXCESS CALORIES WITHOUT SERIOUS COMORBIDITY WITH BODY MASS INDEX (BMI) OF 45.0 TO 49.9 IN ADULT (HCC): ICD-10-CM

## 2019-01-08 DIAGNOSIS — F32.5 MAJOR DEPRESSIVE DISORDER WITH SINGLE EPISODE, IN FULL REMISSION (HCC): ICD-10-CM

## 2019-01-08 DIAGNOSIS — F41.9 ANXIETY: ICD-10-CM

## 2019-01-08 DIAGNOSIS — E78.2 MIXED HYPERCHOLESTEROLEMIA AND HYPERTRIGLYCERIDEMIA: Primary | ICD-10-CM

## 2019-01-08 DIAGNOSIS — K21.9 GASTROESOPHAGEAL REFLUX DISEASE WITHOUT ESOPHAGITIS: ICD-10-CM

## 2019-01-08 DIAGNOSIS — R11.0 NAUSEA IN ADULT: ICD-10-CM

## 2019-01-08 PROBLEM — E66.813 CLASS 3 SEVERE OBESITY WITHOUT SERIOUS COMORBIDITY IN ADULT: Status: ACTIVE | Noted: 2018-05-15

## 2019-01-08 PROCEDURE — 99214 OFFICE O/P EST MOD 30 MIN: CPT | Performed by: INTERNAL MEDICINE

## 2019-01-08 PROCEDURE — G8417 CALC BMI ABV UP PARAM F/U: HCPCS | Performed by: INTERNAL MEDICINE

## 2019-01-08 PROCEDURE — 1036F TOBACCO NON-USER: CPT | Performed by: INTERNAL MEDICINE

## 2019-01-08 PROCEDURE — G8427 DOCREV CUR MEDS BY ELIG CLIN: HCPCS | Performed by: INTERNAL MEDICINE

## 2019-01-08 PROCEDURE — G8484 FLU IMMUNIZE NO ADMIN: HCPCS | Performed by: INTERNAL MEDICINE

## 2019-01-08 RX ORDER — ATORVASTATIN CALCIUM 40 MG/1
TABLET, FILM COATED ORAL
Qty: 30 TABLET | Refills: 5 | Status: SHIPPED | OUTPATIENT
Start: 2019-01-08 | End: 2019-07-10 | Stop reason: SDUPTHER

## 2019-01-08 RX ORDER — ONDANSETRON 4 MG/1
TABLET, FILM COATED ORAL
Qty: 40 TABLET | Refills: 1 | Status: SHIPPED | OUTPATIENT
Start: 2019-01-08 | End: 2019-11-07 | Stop reason: SDUPTHER

## 2019-01-08 ASSESSMENT — ENCOUNTER SYMPTOMS
SORE THROAT: 0
ABDOMINAL PAIN: 0
COUGH: 0
BLOOD IN STOOL: 0
DIARRHEA: 0
NAUSEA: 0
CONSTIPATION: 0
RHINORRHEA: 0
CHEST TIGHTNESS: 0
SHORTNESS OF BREATH: 0

## 2019-03-13 ENCOUNTER — HOSPITAL ENCOUNTER (OUTPATIENT)
Dept: MAMMOGRAPHY | Age: 49
Discharge: HOME | End: 2019-03-15
Payer: MEDICARE

## 2019-03-13 DIAGNOSIS — Z12.39 BREAST SCREENING: ICD-10-CM

## 2019-03-13 PROCEDURE — 77067 SCR MAMMO BI INCL CAD: CPT

## 2019-04-11 DIAGNOSIS — R10.13 EPIGASTRIC ABDOMINAL PAIN: ICD-10-CM

## 2019-04-11 DIAGNOSIS — K21.9 GASTROESOPHAGEAL REFLUX DISEASE WITHOUT ESOPHAGITIS: ICD-10-CM

## 2019-04-12 RX ORDER — PANTOPRAZOLE SODIUM 40 MG/1
40 TABLET, DELAYED RELEASE ORAL DAILY
Qty: 30 TABLET | Refills: 5 | Status: SHIPPED | OUTPATIENT
Start: 2019-04-12 | End: 2019-11-07 | Stop reason: SDUPTHER

## 2019-04-12 NOTE — TELEPHONE ENCOUNTER
Health Maintenance   Topic Date Due    Flu vaccine (Season Ended) 09/01/2019    Potassium monitoring  01/07/2020    Creatinine monitoring  01/07/2020    Cervical cancer screen  05/04/2020    Lipid screen  01/07/2024    DTaP/Tdap/Td vaccine (2 - Td) 05/28/2025    HIV screen  Completed    Pneumococcal 0-64 years Vaccine  Aged Out             (applicable per patient's age: Cancer Screenings, Depression Screening, Fall Risk Screening, Immunizations)    LDL Cholesterol (mg/dL)   Date Value   01/07/2019 144 (H)     AST (U/L)   Date Value   01/07/2019 16     ALT (U/L)   Date Value   01/07/2019 7     BUN (mg/dL)   Date Value   01/07/2019 8      (goal A1C is < 7)   (goal LDL is <100) need 30-50% reduction from baseline     BP Readings from Last 3 Encounters:   01/08/19 130/81   07/17/18 119/76   06/15/18 134/76    (goal /80)      All Future Testing planned in [facility]:  Lab Frequency Next Occurrence   Comprehensive Metabolic Panel Once 67/14/7146   Lipid Panel Once 07/08/2019       Next Visit Date:  Future Appointments   Date Time Provider Sheldon Berry   7/9/2019  2:00 PM [facility] [address] [facility]            Patient Active Problem List:     Mixed hypercholesterolemia and hypertriglyceridemia     Anxiety     Depression     Urinary urgency     Hiatal hernia     Symptomatic cholelithiasis     Renal colic on right side     Gastroesophageal reflux disease without esophagitis     Urethral stenosis     Major depressive disorder with single episode, in full remission ([facility] [address]     Class 3 severe obesity without serious comorbidity in adult [facility])

## 2019-06-11 DIAGNOSIS — K44.9 HIATAL HERNIA: ICD-10-CM

## 2019-06-11 DIAGNOSIS — K21.9 GASTROESOPHAGEAL REFLUX DISEASE WITHOUT ESOPHAGITIS: ICD-10-CM

## 2019-06-11 RX ORDER — RANITIDINE 150 MG/1
TABLET ORAL
Qty: 60 TABLET | Refills: 5 | Status: SHIPPED | OUTPATIENT
Start: 2019-06-11 | End: 2019-12-31

## 2019-06-11 NOTE — TELEPHONE ENCOUNTER
Health Maintenance   Topic Date Due    Flu vaccine (Season Ended) 09/01/2019    Potassium monitoring  01/07/2020    Creatinine monitoring  01/07/2020    Cervical cancer screen  05/04/2020    Lipid screen  01/07/2024    DTaP/Tdap/Td vaccine (2 - Td) 05/28/2025    HIV screen  Completed    Pneumococcal 0-64 years Vaccine  Aged Out             (applicable per patient's age: Cancer Screenings, Depression Screening, Fall Risk Screening, Immunizations)    LDL Cholesterol (mg/dL)   Date Value   01/07/2019 144 (H)     AST (U/L)   Date Value   01/07/2019 16     ALT (U/L)   Date Value   01/07/2019 7     BUN (mg/dL)   Date Value   01/07/2019 8      (goal A1C is < 7)   (goal LDL is <100) need 30-50% reduction from baseline     BP Readings from Last 3 Encounters:   01/08/19 130/81   07/17/18 119/76   06/15/18 134/76    (goal /80)      All Future Testing planned in [facility]:  Lab Frequency Next Occurrence   Comprehensive Metabolic Panel Once 90/29/0984   Lipid Panel Once 07/08/2019       Next Visit Date:  Future Appointments   Date Time Provider Sheldon Berry   7/9/2019  2:00 PM [facility] [address] [facility]            Patient Active Problem List:     Mixed hypercholesterolemia and hypertriglyceridemia     Anxiety     Depression     Urinary urgency     Hiatal hernia     Symptomatic cholelithiasis     Renal colic on right side     Gastroesophageal reflux disease without esophagitis     Urethral stenosis     Major depressive disorder with single episode, in full remission ([facility] [address]     Class 3 severe obesity without serious comorbidity in adult [facility])

## 2019-07-09 DIAGNOSIS — I10 ESSENTIAL HYPERTENSION: ICD-10-CM

## 2019-07-09 RX ORDER — POTASSIUM CHLORIDE 20 MEQ/1
20 TABLET, EXTENDED RELEASE ORAL DAILY
Qty: 30 TABLET | Refills: 5 | Status: SHIPPED | OUTPATIENT
Start: 2019-07-09 | End: 2020-02-07

## 2019-07-10 DIAGNOSIS — E78.2 MIXED HYPERCHOLESTEROLEMIA AND HYPERTRIGLYCERIDEMIA: ICD-10-CM

## 2019-07-10 RX ORDER — ATORVASTATIN CALCIUM 40 MG/1
TABLET, FILM COATED ORAL
Qty: 30 TABLET | Refills: 5 | Status: SHIPPED | OUTPATIENT
Start: 2019-07-10 | End: 2020-01-09

## 2019-07-10 NOTE — TELEPHONE ENCOUNTER
Health Maintenance   Topic Date Due    Flu vaccine (1) 09/01/2019    Potassium monitoring  01/07/2020    Creatinine monitoring  01/07/2020    Cervical cancer screen  05/04/2020    Lipid screen  01/07/2024    DTaP/Tdap/Td vaccine (2 - Td) 05/28/2025    HIV screen  Completed    Pneumococcal 0-64 years Vaccine  Aged Out             (applicable per patient's age: Cancer Screenings, Depression Screening, Fall Risk Screening, Immunizations)    LDL Cholesterol (mg/dL)   Date Value   01/07/2019 144 (H)     AST (U/L)   Date Value   01/07/2019 16     ALT (U/L)   Date Value   01/07/2019 7     BUN (mg/dL)   Date Value   01/07/2019 8      (goal A1C is < 7)   (goal LDL is <100) need 30-50% reduction from baseline     BP Readings from Last 3 Encounters:   01/08/19 130/81   07/17/18 119/76   06/15/18 134/76    (goal /80)      All Future Testing planned in [facility]:  Lab Frequency Next Occurrence   Comprehensive Metabolic Panel Once 53/11/9672   Lipid Panel Once 07/08/2019       Next Visit Date:  Future Appointments   Date Time Provider Sheldon Berry   8/1/2019  1:15 PM MD Erendira Nazario            Patient Active Problem List:     Mixed hypercholesterolemia and hypertriglyceridemia     Anxiety     Depression     Urinary urgency     Hiatal hernia     Symptomatic cholelithiasis     Renal colic on right side     Gastroesophageal reflux disease without esophagitis     Urethral stenosis     Major depressive disorder with single episode, in full remission ([facility] [address]     Class 3 severe obesity without serious comorbidity in adult [facility]) clears

## 2019-07-30 ENCOUNTER — HOSPITAL ENCOUNTER (OUTPATIENT)
Age: 49
Discharge: HOME | End: 2019-07-30
Payer: MEDICARE

## 2019-07-30 DIAGNOSIS — E78.2 MIXED HYPERCHOLESTEROLEMIA AND HYPERTRIGLYCERIDEMIA: ICD-10-CM

## 2019-07-30 LAB
ALBUMIN SERPL-MCNC: 3.7 G/DL (ref 3.5–5.2)
ALBUMIN/GLOBULIN RATIO: ABNORMAL (ref 1–2.5)
ALP BLD-CCNC: 96 U/L (ref 35–104)
ALT SERPL-CCNC: 14 U/L (ref 5–33)
ANION GAP SERPL CALCULATED.3IONS-SCNC: 12 MMOL/L (ref 9–17)
AST SERPL-CCNC: 23 U/L
BILIRUB SERPL-MCNC: 0.29 MG/DL (ref 0.3–1.2)
BUN BLDV-MCNC: 9 MG/DL (ref 6–20)
BUN/CREAT BLD: 14 (ref 9–20)
CALCIUM SERPL-MCNC: 9.6 MG/DL (ref 8.6–10.4)
CHLORIDE BLD-SCNC: 104 MMOL/L (ref 98–107)
CHOLESTEROL/HDL RATIO: 3.2
CHOLESTEROL: 202 MG/DL
CO2: 24 MMOL/L (ref 20–31)
CREAT SERPL-MCNC: 0.64 MG/DL (ref 0.5–0.9)
GFR AFRICAN AMERICAN: >60 ML/MIN
GFR NON-AFRICAN AMERICAN: >60 ML/MIN
GFR SERPL CREATININE-BSD FRML MDRD: ABNORMAL ML/MIN/{1.73_M2}
GFR SERPL CREATININE-BSD FRML MDRD: ABNORMAL ML/MIN/{1.73_M2}
GLUCOSE BLD-MCNC: 104 MG/DL (ref 70–99)
HDLC SERPL-MCNC: 64 MG/DL
LDL CHOLESTEROL: 113 MG/DL (ref 0–130)
PATIENT FASTING?: YES
POTASSIUM SERPL-SCNC: 3.9 MMOL/L (ref 3.7–5.3)
SODIUM BLD-SCNC: 140 MMOL/L (ref 135–144)
TOTAL PROTEIN: 8.2 G/DL (ref 6.4–8.3)
TRIGL SERPL-MCNC: 123 MG/DL
VLDLC SERPL CALC-MCNC: ABNORMAL MG/DL (ref 1–30)

## 2019-07-30 PROCEDURE — 80053 COMPREHEN METABOLIC PANEL: CPT

## 2019-07-30 PROCEDURE — 80061 LIPID PANEL: CPT

## 2019-07-30 PROCEDURE — 36415 COLL VENOUS BLD VENIPUNCTURE: CPT

## 2019-08-01 ENCOUNTER — OFFICE VISIT (OUTPATIENT)
Dept: FAMILY MEDICINE CLINIC | Age: 49
End: 2019-08-01
Payer: MEDICARE

## 2019-08-01 VITALS
DIASTOLIC BLOOD PRESSURE: 80 MMHG | SYSTOLIC BLOOD PRESSURE: 120 MMHG | HEIGHT: 62 IN | WEIGHT: 263 LBS | HEART RATE: 73 BPM | BODY MASS INDEX: 48.4 KG/M2

## 2019-08-01 DIAGNOSIS — E66.01 CLASS 3 SEVERE OBESITY DUE TO EXCESS CALORIES WITHOUT SERIOUS COMORBIDITY WITH BODY MASS INDEX (BMI) OF 45.0 TO 49.9 IN ADULT (HCC): ICD-10-CM

## 2019-08-01 DIAGNOSIS — E78.2 MIXED HYPERCHOLESTEROLEMIA AND HYPERTRIGLYCERIDEMIA: Primary | ICD-10-CM

## 2019-08-01 DIAGNOSIS — R42 LIGHT HEADEDNESS: ICD-10-CM

## 2019-08-01 DIAGNOSIS — R73.9 HYPERGLYCEMIA: ICD-10-CM

## 2019-08-01 DIAGNOSIS — K21.9 GASTROESOPHAGEAL REFLUX DISEASE WITHOUT ESOPHAGITIS: ICD-10-CM

## 2019-08-01 DIAGNOSIS — F32.5 MAJOR DEPRESSIVE DISORDER WITH SINGLE EPISODE, IN FULL REMISSION (HCC): ICD-10-CM

## 2019-08-01 PROCEDURE — G8417 CALC BMI ABV UP PARAM F/U: HCPCS | Performed by: INTERNAL MEDICINE

## 2019-08-01 PROCEDURE — 99214 OFFICE O/P EST MOD 30 MIN: CPT | Performed by: INTERNAL MEDICINE

## 2019-08-01 PROCEDURE — G8427 DOCREV CUR MEDS BY ELIG CLIN: HCPCS | Performed by: INTERNAL MEDICINE

## 2019-08-01 PROCEDURE — 1036F TOBACCO NON-USER: CPT | Performed by: INTERNAL MEDICINE

## 2019-08-01 ASSESSMENT — ENCOUNTER SYMPTOMS
CHEST TIGHTNESS: 0
DIARRHEA: 0
CONSTIPATION: 0
ABDOMINAL PAIN: 0
SORE THROAT: 0
NAUSEA: 0
COUGH: 0
BLOOD IN STOOL: 0
RHINORRHEA: 0
SHORTNESS OF BREATH: 0

## 2019-08-01 NOTE — PROGRESS NOTES
Chronic Disease Visit Information    BP Readings from Last 3 Encounters:   01/08/19 130/81   07/17/18 119/76   06/15/18 134/76          LDL Cholesterol (mg/dL)   Date Value   07/30/2019 113     HDL (mg/dL)   Date Value   07/30/2019 64     BUN (mg/dL)   Date Value   07/30/2019 9     CREATININE (mg/dL)   Date Value   07/30/2019 0.64     Glucose (mg/dL)   Date Value   07/30/2019 104 (H)            Have you changed or started any medications since your last visit including any over-the-counter medicines, vitamins, or herbal medicines? no   Are you having any side effects from any of your medications? -  no  Have you stopped taking any of your medications? Is so, why? -  no    Have you seen any other physician or provider since your last visit? No  Have you had any other diagnostic tests since your last visit? Yes - Records Requested  Have you been seen in the emergency room and/or had an admission to a hospital since we last saw you? No  Have you had your annual diabetic retinal (eye) exam? Yes - Records Requested  Have you had your routine dental cleaning in the past 6 months? yes -     Have you activated your [employer] account? If not, what are your barriers?  Yes     Patient Care Team:  Love Deng MD as PCP - General (Internal Medicine)  Love Deng MD as PCP - [facility] Provider  Stalin Lomax (Psychiatry)  DIOMEDES Hernandez - CNP         Medical History Review  Past Medical, Family, and Social History reviewed and does contribute to the patient presenting condition    Health Maintenance   Topic Date Due    Diabetes screen  08/19/2010    Flu vaccine (1) 09/01/2019    Cervical cancer screen  05/04/2020    Potassium monitoring  07/30/2020    Creatinine monitoring  07/30/2020    Lipid screen  07/30/2024    DTaP/Tdap/Td vaccine (2 - Td) 05/28/2025    HIV screen  Completed    Pneumococcal 0-64 years Vaccine  Aged Out

## 2019-08-01 NOTE — PROGRESS NOTES
Subjective:      Patient ID: Tash Higgins is a 50 y.o. female. Shira Mitchell presents for a check up on her medical conditions HTN, Hyperlipidemia, GERD. Shira Mitchell denies new problems. Medications were reviewed with Shira Mitchell, she is  tolerating the medication. Bowels are regular. There has not been rectal bleeding. Shira Mitchell denies urinary complications, the urine stream is good. Shira Mitchell denies chest pain and denies increasing shortness of breath. Luis A Baugh states she has been having episodes of feeling light headed when she initially gets up or lays down. She has been trying to increase fluids and it has helped some. Following with Dr. Damaris Acosta. Depression / anxiety is controlled on the current medication. Past Medical History:  No date:  Anxiety  No date: Asthma  5/15/2018: Class 3 obesity due to excess calories without serious   comorbidity with body mass index (BMI) of 45.0 to 49.9 in adult  No date: Crohn's disease ([facility]  No date: Depression  No date: GERD (gastroesophageal reflux disease)  No date: Hyperlipidemia  No date: Hypertension  No date: Wears glasses    Past Surgical History:  No date:  SECTION  9/25/15: CHOLECYSTECTOMY, LAPAROSCOPIC  2015: CYSTOSCOPY  No date: MOUTH SURGERY    Social History    Socioeconomic History      Marital status:       Spouse name: Not on file      Number of children: Not on file      Years of education: Not on file      Highest education level: Not on file    Occupational History      Not on file    Social Needs      Financial resource strain: Not on file      Food insecurity:        Worry: Not on file        Inability: Not on file      Transportation needs:        Medical: Not on file        Non-medical: Not on file    Tobacco Use      Smoking status: Never Smoker      Smokeless tobacco: Never Used    Substance and Sexual Activity      Alcohol use: No        Comment: occassional      Drug use: No      Sexual activity: Yes        Partners: Male Pressure Monitor KIT, Monitor BP daily. , Disp: 1 kit, Rfl: 0    No current facility-administered medications on file prior to visit.          Lab Results       Component                Value               Date                       NA                       140                 07/30/2019                 K                        3.9                 07/30/2019                 CL                       104                 07/30/2019                 CO2                      24                  07/30/2019                 BUN                      9                   07/30/2019                 CREATININE               0.64                07/30/2019                 GLUCOSE                  104 (H)             07/30/2019                 CALCIUM                  9.6                 07/30/2019                 PROT                     8.2                 07/30/2019                 LABALBU                  3.7                 07/30/2019                 BILITOT                  0.29 (L)            07/30/2019                 ALKPHOS                  96                  07/30/2019                 AST                      23                  07/30/2019                 ALT                      14                  07/30/2019                 LABGLOM                  >60                 07/30/2019                 GFRAA                    >60                 07/30/2019              No results found for: LABA1C  No results found for: EAG    Lab Results       Component                Value               Date                       CHOL                     202 (H)             07/30/2019                 CHOL                     246 (H)             01/07/2019                 CHOL                     181                 10/06/2017            Lab Results       Component                Value               Date                       TRIG                     123                 07/30/2019                 TRIG                     158 (H)

## 2019-08-01 NOTE — PATIENT INSTRUCTIONS
SURVEY:    You may be receiving a survey from [employer] regarding your visit today. Please complete the survey to enable us to provide the highest quality of care to you and your family. If you cannot score us a very good on any question, please call the office to discuss how we could of made your experience a very good one. Thank you. 1. Mixed hypercholesterolemia and hypertriglyceridemia  Controlled on Lipitor. Obtain labs approximately one week prior to the office appointment. Please fast for 12 hours prior to obtaining the labs. Water or black coffee (no cream or sugar) is allowed prior to the the labs. - Comprehensive Metabolic Panel; Future  - Lipid Panel; Future    2. Major depressive disorder with single episode, in full remission ([address]  Controlled with the treatment by Dr. Pratibha Morgan in Psychiatry. 3. Gastroesophageal reflux disease without esophagitis  Controlled on Zantac. 4. Class 3 severe obesity due to excess calories without serious comorbidity with body mass index (BMI) of 45.0 to 49.9 in adult [facility])  Continue to work on exercise / wt loss. 5. Hyperglycemia  HgbA1C with next labs. - Hemoglobin A1C; Future    6. Light headedness  Increase fluid intake. Ruby Diaz was instructed to follow up in the clinic in 6 months for check up or as needed with any medical issues.

## 2019-08-06 DIAGNOSIS — I10 ESSENTIAL HYPERTENSION: ICD-10-CM

## 2019-08-06 DIAGNOSIS — R60.0 BILATERAL EDEMA OF LOWER EXTREMITY: ICD-10-CM

## 2019-08-06 RX ORDER — HYDROCHLOROTHIAZIDE 25 MG/1
25 TABLET ORAL DAILY
Qty: 30 TABLET | Refills: 5 | Status: SHIPPED | OUTPATIENT
Start: 2019-08-06 | End: 2020-03-09

## 2019-08-06 NOTE — TELEPHONE ENCOUNTER
Health Maintenance   Topic Date Due    Diabetes screen  08/19/2010    Flu vaccine (1) 09/01/2019    Cervical cancer screen  05/04/2020    Potassium monitoring  07/30/2020    Creatinine monitoring  07/30/2020    Lipid screen  07/30/2024    DTaP/Tdap/Td vaccine (2 - Td) 05/28/2025    HIV screen  Completed    Pneumococcal 0-64 years Vaccine  Aged Out             (applicable per patient's age: Cancer Screenings, Depression Screening, Fall Risk Screening, Immunizations)    LDL Cholesterol (mg/dL)   Date Value   07/30/2019 113     AST (U/L)   Date Value   07/30/2019 23     ALT (U/L)   Date Value   07/30/2019 14     BUN (mg/dL)   Date Value   07/30/2019 9      (goal A1C is < 7)   (goal LDL is <100) need 30-50% reduction from baseline     BP Readings from Last 3 Encounters:   08/01/19 120/80   01/08/19 130/81   07/17/18 119/76    (goal /80)      All Future Testing planned in [facility]:  Lab Frequency Next Occurrence   Comprehensive Metabolic Panel Once 74/07/0192   Hemoglobin A1C Once 02/01/2020   Lipid Panel Once 02/01/2020       Next Visit Date:  Future Appointments   Date Time Provider Sheldon Berry   2/4/2020  1:45 PM Astrid Frost MD [address]            Patient Active Problem List:     Mixed hypercholesterolemia and hypertriglyceridemia     Anxiety     Depression     Urinary urgency     Hiatal hernia     Symptomatic cholelithiasis     Renal colic on right side     Gastroesophageal reflux disease without esophagitis     Urethral stenosis     Major depressive disorder with single episode, in full remission ([facility]     Class 3 severe obesity without serious comorbidity in [facility])

## 2019-11-07 DIAGNOSIS — R11.0 NAUSEA IN ADULT: ICD-10-CM

## 2019-11-07 DIAGNOSIS — R10.13 EPIGASTRIC ABDOMINAL PAIN: ICD-10-CM

## 2019-11-07 DIAGNOSIS — K21.9 GASTROESOPHAGEAL REFLUX DISEASE WITHOUT ESOPHAGITIS: ICD-10-CM

## 2019-11-08 RX ORDER — ONDANSETRON 4 MG/1
TABLET, FILM COATED ORAL
Qty: 40 TABLET | Refills: 1 | Status: SHIPPED | OUTPATIENT
Start: 2019-11-08

## 2019-11-08 RX ORDER — PANTOPRAZOLE SODIUM 40 MG/1
40 TABLET, DELAYED RELEASE ORAL DAILY
Qty: 30 TABLET | Refills: 5 | Status: SHIPPED | OUTPATIENT
Start: 2019-11-08 | End: 2020-02-04 | Stop reason: SDUPTHER

## 2019-12-09 DIAGNOSIS — K44.9 HIATAL HERNIA: ICD-10-CM

## 2019-12-09 DIAGNOSIS — K21.9 GASTROESOPHAGEAL REFLUX DISEASE WITHOUT ESOPHAGITIS: ICD-10-CM

## 2019-12-10 RX ORDER — RANITIDINE 150 MG/1
TABLET ORAL
Qty: 60 TABLET | Refills: 5 | OUTPATIENT
Start: 2019-12-10

## 2019-12-10 RX ORDER — FAMOTIDINE 20 MG/1
20 TABLET, FILM COATED ORAL 2 TIMES DAILY
Qty: 60 TABLET | Refills: 5 | Status: SHIPPED | OUTPATIENT
Start: 2019-12-10 | End: 2020-02-04

## 2019-12-31 ENCOUNTER — HOSPITAL ENCOUNTER (EMERGENCY)
Age: 49
Discharge: HOME | End: 2019-12-31
Attending: EMERGENCY MEDICINE
Payer: MEDICARE

## 2019-12-31 ENCOUNTER — TELEPHONE (OUTPATIENT)
Dept: FAMILY MEDICINE CLINIC | Age: 49
End: 2019-12-31

## 2019-12-31 VITALS
DIASTOLIC BLOOD PRESSURE: 80 MMHG | TEMPERATURE: 98.8 F | RESPIRATION RATE: 17 BRPM | OXYGEN SATURATION: 98 % | HEART RATE: 104 BPM | SYSTOLIC BLOOD PRESSURE: 126 MMHG

## 2019-12-31 LAB
DIRECT EXAM: NORMAL
Lab: NORMAL
Lab: NORMAL
SPECIMEN DESCRIPTION: NORMAL
SPECIMEN DESCRIPTION: NORMAL

## 2019-12-31 PROCEDURE — 6360000002 HC RX W HCPCS: Performed by: EMERGENCY MEDICINE

## 2019-12-31 PROCEDURE — 87880 STREP A ASSAY W/OPTIC: CPT

## 2019-12-31 PROCEDURE — 99282 EMERGENCY DEPT VISIT SF MDM: CPT

## 2019-12-31 PROCEDURE — 87804 INFLUENZA ASSAY W/OPTIC: CPT

## 2019-12-31 RX ORDER — DEXAMETHASONE SODIUM PHOSPHATE 10 MG/ML
10 INJECTION INTRAMUSCULAR; INTRAVENOUS ONCE
Status: COMPLETED | OUTPATIENT
Start: 2019-12-31 | End: 2019-12-31

## 2019-12-31 RX ORDER — ALBUTEROL SULFATE 90 UG/1
2 AEROSOL, METERED RESPIRATORY (INHALATION) EVERY 4 HOURS PRN
Qty: 1 INHALER | Refills: 0 | Status: SHIPPED | OUTPATIENT
Start: 2019-12-31 | End: 2022-03-14 | Stop reason: SDUPTHER

## 2019-12-31 RX ORDER — BENZONATATE 100 MG/1
100 CAPSULE ORAL 2 TIMES DAILY PRN
Qty: 30 CAPSULE | Refills: 0 | Status: SHIPPED | OUTPATIENT
Start: 2019-12-31 | End: 2020-01-15

## 2019-12-31 RX ADMIN — DEXAMETHASONE SODIUM PHOSPHATE 10 MG: 10 INJECTION, SOLUTION INTRAMUSCULAR; INTRAVENOUS at 06:04

## 2019-12-31 ASSESSMENT — PAIN SCALES - GENERAL: PAINLEVEL_OUTOF10: 8

## 2019-12-31 ASSESSMENT — PAIN DESCRIPTION - LOCATION: LOCATION: BACK

## 2019-12-31 ASSESSMENT — PAIN DESCRIPTION - PAIN TYPE: TYPE: ACUTE PAIN

## 2019-12-31 ASSESSMENT — PAIN DESCRIPTION - DESCRIPTORS: DESCRIPTORS: ACHING

## 2019-12-31 ASSESSMENT — PAIN DESCRIPTION - ORIENTATION: ORIENTATION: MID

## 2019-12-31 ASSESSMENT — PAIN DESCRIPTION - FREQUENCY: FREQUENCY: CONTINUOUS

## 2020-01-02 ENCOUNTER — OFFICE VISIT (OUTPATIENT)
Dept: FAMILY MEDICINE CLINIC | Age: 50
End: 2020-01-02
Payer: MEDICARE

## 2020-01-02 PROCEDURE — G8427 DOCREV CUR MEDS BY ELIG CLIN: HCPCS | Performed by: INTERNAL MEDICINE

## 2020-01-02 PROCEDURE — G8484 FLU IMMUNIZE NO ADMIN: HCPCS | Performed by: INTERNAL MEDICINE

## 2020-01-02 PROCEDURE — 99213 OFFICE O/P EST LOW 20 MIN: CPT | Performed by: INTERNAL MEDICINE

## 2020-01-02 PROCEDURE — 1036F TOBACCO NON-USER: CPT | Performed by: INTERNAL MEDICINE

## 2020-01-02 PROCEDURE — G8417 CALC BMI ABV UP PARAM F/U: HCPCS | Performed by: INTERNAL MEDICINE

## 2020-01-02 RX ORDER — OSELTAMIVIR PHOSPHATE 75 MG/1
75 CAPSULE ORAL DAILY
Qty: 10 CAPSULE | Refills: 0 | Status: SHIPPED | OUTPATIENT
Start: 2020-01-02 | End: 2020-01-12

## 2020-01-02 RX ORDER — AZITHROMYCIN 250 MG/1
TABLET, FILM COATED ORAL
Qty: 1 PACKET | Refills: 0 | Status: SHIPPED | OUTPATIENT
Start: 2020-01-02 | End: 2020-03-16 | Stop reason: SDUPTHER

## 2020-01-02 ASSESSMENT — ENCOUNTER SYMPTOMS
ABDOMINAL PAIN: 0
DIARRHEA: 0
CONSTIPATION: 0
BLOOD IN STOOL: 0
NAUSEA: 0
COUGH: 1
SHORTNESS OF BREATH: 1
SORE THROAT: 0
CHEST TIGHTNESS: 0
RHINORRHEA: 0

## 2020-01-02 NOTE — PROGRESS NOTES
Visit Information    Have you changed or started any medications since your last visit including any over-the-counter medicines, vitamins, or herbal medicines? no   Are you having any side effects from any of your medications? -  no  Have you stopped taking any of your medications? Is so, why? -  no    Have you seen any other physician or provider since your last visit? No  Have you had any other diagnostic tests since your last visit? No  Have you been seen in the emergency room and/or had an admission to a hospital since we last saw you? No  Have you had your routine dental cleaning in the past 6 months? no    Have you activated your [employer] account? If not, what are your barriers?  Yes     Patient Care Team:  Sindy Hansk MD as PCP - General (Internal Medicine)  Sindy Hanks MD as PCP - [facility]  Drew Honeycutt (Psychiatry)  DIOMEDES Hernandez - CNP    Medical History Review  Past Medical, Family, and Social History reviewed and does not contribute to the patient presenting condition    Health Maintenance   Topic Date Due    Diabetes screen  08/19/2010    Annual Wellness Visit (AWV)  05/29/2019    Flu vaccine (1) 09/01/2019    Cervical cancer screen  05/04/2020    Lipid screen  07/30/2020    Potassium monitoring  07/30/2020    Creatinine monitoring  07/30/2020    DTaP/Tdap/Td vaccine (2 - Td) 05/28/2025    HIV screen  Completed    Pneumococcal 0-64 years Vaccine  Aged Out
motion. Lymphadenopathy:      Cervical: No cervical adenopathy. Skin:     Findings: No rash. Neurological:      Mental Status: She is alert. Psychiatric:         Behavior: Behavior normal.         Thought Content: Thought content normal.         Assessment:       Diagnosis Orders   1. Acute bacterial sinusitis  azithromycin (ZITHROMAX Z-ODN) 250 MG tablet   2. Cough             Plan:      1. Acute bacterial sinusitis  Take the Zithromax (Z Don) as prescribed. Return to the clinic if the symptoms continue or worsens. - azithromycin (ZITHROMAX Z-DON) 250 MG tablet; Two tablets by mouth the first day  then one tablet daily for 4 days. Dispense: 1 packet; Refill: 0    2. Cough  Take Tessalon 100 mg every 8 [facility]. David Varela is instructed to return to the clinic if the symptoms continue or worsen. David Varela  was also instructed to go to the emergency room department if the symptoms significantly worsen before an appointment can be made.                Niru Giordano MD

## 2020-01-04 NOTE — ED PROVIDER NOTES
atorvastatin (LIPITOR) 40 MG tablet TAKE 1 TABLET BY MOUTH DAILY. 30 tablet 5    potassium chloride (KLOR-CON M) 20 MEQ extended release tablet TAKE 1 TABLET BY MOUTH DAILY 30 tablet 5    sertraline (ZOLOFT) 100 MG tablet TAKE 2 TABLETS BY MOUTH EVERY DAY 30 tablet 5    ALPRAZolam (XANAX) 0.5 MG tablet TAKE 1 TABLET BY MOUTH TWICE DAILY AS NEEDED  2    norethindrone-ethinyl estradiol (ORTHO-NOVUM 7/7/7, 28,) 0.5/0.75/1-35 MG-MCG per tablet Take 1 tablet by mouth daily. 1 packet 3    azithromycin (ZITHROMAX Z-DON) 250 MG tablet Two tablets by mouth the first day  then one tablet daily for 4 days. 1 packet 0    oseltamivir (TAMIFLU) 75 MG capsule Take 1 capsule by mouth daily for 10 days 10 capsule 0    Blood Pressure Monitor KIT Monitor BP daily.  1 kit 0       ALLERGIES    Allergies   Allergen Reactions    Other Swelling     EGG PLANT    Pcn [Penicillins] Rash       FAMILY HISTORY    Family History   Problem Relation Age of Onset    Diabetes Mother     Cancer Mother     Depression Father     High Blood Pressure Father        SOCIAL HISTORY    Social History     Socioeconomic History    Marital status:      Spouse name: None    Number of children: None    Years of education: None    Highest education level: None   Occupational History    None   Social Needs    Financial resource strain: None    Food insecurity:     Worry: None     Inability: None    Transportation needs:     Medical: None     Non-medical: None   Tobacco Use    Smoking status: Never Smoker    Smokeless tobacco: Never Used   Substance and Sexual Activity    Alcohol use: No     Comment: occassional    Drug use: No    Sexual activity: Yes     Partners: Male   Lifestyle    Physical activity:     Days per week: None     Minutes per session: None    Stress: None   Relationships    Social connections:     Talks on phone: None     Gets together: None     Attends [employer] service: None     Active member of club or organization: None     Attends meetings of clubs or organizations: None     Relationship status: None    Intimate partner violence:     Fear of current or ex partner: None     Emotionally abused: None     Physically abused: None     Forced sexual activity: None   Other Topics Concern    None   Social History Narrative    None       REVIEW OF SYSTEMS    Constitutional:  Denies fever, chills, weight loss or weakness   Eyes:  Denies photophobia or discharge   HENT: c/o  sore throat but denies ear pain   Respiratory:  c/o cough but denies shortness of breath   Cardiovascular:  Denies chest pain, palpitations or swelling   GI:  Denies abdominal pain, nausea, vomiting, or diarrhea   Musculoskeletal:  Denies back pain   Skin:  Denies rash   Neurologic:  Denies headache, focal weakness or sensory changes   Endocrine:  Denies polyuria or polydypsia   Lymphatic:  Denies swollen glands   Psychiatric:  Denies depression, suicidal ideation or homicidal ideation   All systems negative except as marked. PHYSICAL EXAM    VITAL SIGNS: /80   Pulse 104   Temp 98.8 °F (37.1 °C) (Oral)   Resp 17   LMP 12/24/2019   SpO2 98%    Constitutional:  Well developed, Well nourished, No acute distress, Non-toxic appearance. HENT:  Normocephalic, Atraumatic, Bilateral external ears normal, Oropharynx moist, posterior pharyngeal edema present, no oral exudates, Nose normal. Neck- Normal range of motion, No tenderness, Supple, No stridor. Eyes:  PERRL, EOMI, Conjunctiva normal, No discharge. Respiratory:  Normal breath sounds, No respiratory distress, diffuse expiratory wheezing, No chest tenderness. Cardiovascular:  Normal heart rate, Normal rhythm, No murmurs, No rubs, No gallops. GI:  Bowel sounds normal, Soft, No tenderness, No masses, No pulsatile masses. :  No CVA tenderness. Musculoskeletal:  Intact distal pulses, No edema, No tenderness, No cyanosis, No clubbing. Good range of motion in all major joints.  No tenderness to palpation or major deformities noted. Back- No tenderness. Integument:  Warm, Dry, No erythema, No rash. Lymphatic:  No lymphadenopathy noted. Neurologic:  Alert & oriented x 3, Normal motor function, Normal sensory function, No focal deficits noted. Psychiatric:  Affect normal, Judgment normal, Mood normal.         REEVALUATION   Patient was updated the results of labs        Labs  Labs Reviewed   RAPID INFLUENZA A/B ANTIGENS   STREP SCREEN GROUP A THROAT             Summation      Patient Course:     ED Medications administered this visit:    Medications   dexamethasone (DECADRON) injection 10 mg (10 mg Oral Given 12/31/19 0604)       New Prescriptions from this visit:    Discharge Medication List as of 12/31/2019  6:00 AM      START taking these medications    Details   albuterol sulfate  (90 Base) MCG/ACT inhaler Inhale 2 puffs into the lungs every 4 hours as needed for Wheezing or Shortness of Breath (Space out to every 6 hours as symptoms improve), Disp-1 Inhaler, R-0Print      benzonatate (TESSALON) 100 MG capsule Take 1 capsule by mouth 2 times daily as needed for Cough, Disp-30 capsule, R-0Print             Follow-up:  Smith Michel MD  [address]  [phone]    Call in 1 day          Final Impression:   1.  Viral upper respiratory tract infection               (Please note that portions of this note were completed with a voice recognition program.  Efforts were made to edit the dictations but occasionally words are mis-transcribed.)            Mike Macedo MD  01/06/20 1241

## 2020-01-09 RX ORDER — ATORVASTATIN CALCIUM 40 MG/1
TABLET, FILM COATED ORAL
Qty: 30 TABLET | Refills: 5 | Status: SHIPPED | OUTPATIENT
Start: 2020-01-09 | End: 2020-06-09

## 2020-02-03 ENCOUNTER — HOSPITAL ENCOUNTER (OUTPATIENT)
Age: 50
Discharge: HOME | End: 2020-02-03
Payer: MEDICARE

## 2020-02-03 LAB
ALBUMIN SERPL-MCNC: 4.1 G/DL (ref 3.5–5.2)
ALBUMIN/GLOBULIN RATIO: ABNORMAL (ref 1–2.5)
ALP BLD-CCNC: 99 U/L (ref 35–104)
ALT SERPL-CCNC: 12 U/L (ref 5–33)
ANION GAP SERPL CALCULATED.3IONS-SCNC: 14 MMOL/L (ref 9–17)
AST SERPL-CCNC: 19 U/L
BILIRUB SERPL-MCNC: 0.44 MG/DL (ref 0.3–1.2)
BUN BLDV-MCNC: 11 MG/DL (ref 6–20)
BUN/CREAT BLD: 16 (ref 9–20)
CALCIUM SERPL-MCNC: 9.9 MG/DL (ref 8.6–10.4)
CHLORIDE BLD-SCNC: 99 MMOL/L (ref 98–107)
CHOLESTEROL/HDL RATIO: 3.1
CHOLESTEROL: 214 MG/DL
CO2: 23 MMOL/L (ref 20–31)
CREAT SERPL-MCNC: 0.69 MG/DL (ref 0.5–0.9)
ESTIMATED AVERAGE GLUCOSE: 108 MG/DL
GFR AFRICAN AMERICAN: >60 ML/MIN
GFR NON-AFRICAN AMERICAN: >60 ML/MIN
GFR SERPL CREATININE-BSD FRML MDRD: ABNORMAL ML/MIN/{1.73_M2}
GFR SERPL CREATININE-BSD FRML MDRD: ABNORMAL ML/MIN/{1.73_M2}
GLUCOSE BLD-MCNC: 101 MG/DL (ref 70–99)
HBA1C MFR BLD: 5.4 % (ref 4.8–5.9)
HDLC SERPL-MCNC: 68 MG/DL
LDL CHOLESTEROL: 116 MG/DL (ref 0–130)
PATIENT FASTING?: YES
POTASSIUM SERPL-SCNC: 3.8 MMOL/L (ref 3.7–5.3)
SODIUM BLD-SCNC: 136 MMOL/L (ref 135–144)
TOTAL PROTEIN: 8.4 G/DL (ref 6.4–8.3)
TRIGL SERPL-MCNC: 148 MG/DL
VLDLC SERPL CALC-MCNC: ABNORMAL MG/DL (ref 1–30)

## 2020-02-03 PROCEDURE — 83036 HEMOGLOBIN GLYCOSYLATED A1C: CPT

## 2020-02-03 PROCEDURE — 36415 COLL VENOUS BLD VENIPUNCTURE: CPT

## 2020-02-03 PROCEDURE — 80053 COMPREHEN METABOLIC PANEL: CPT

## 2020-02-03 PROCEDURE — 80061 LIPID PANEL: CPT

## 2020-02-04 ENCOUNTER — OFFICE VISIT (OUTPATIENT)
Dept: FAMILY MEDICINE CLINIC | Age: 50
End: 2020-02-04
Payer: MEDICARE

## 2020-02-04 VITALS
HEART RATE: 83 BPM | HEIGHT: 62 IN | WEIGHT: 253 LBS | SYSTOLIC BLOOD PRESSURE: 139 MMHG | BODY MASS INDEX: 46.56 KG/M2 | DIASTOLIC BLOOD PRESSURE: 74 MMHG

## 2020-02-04 PROBLEM — R73.9 HYPERGLYCEMIA: Status: ACTIVE | Noted: 2020-02-04

## 2020-02-04 PROCEDURE — 99214 OFFICE O/P EST MOD 30 MIN: CPT | Performed by: INTERNAL MEDICINE

## 2020-02-04 PROCEDURE — G8484 FLU IMMUNIZE NO ADMIN: HCPCS | Performed by: INTERNAL MEDICINE

## 2020-02-04 PROCEDURE — 1036F TOBACCO NON-USER: CPT | Performed by: INTERNAL MEDICINE

## 2020-02-04 PROCEDURE — G8427 DOCREV CUR MEDS BY ELIG CLIN: HCPCS | Performed by: INTERNAL MEDICINE

## 2020-02-04 PROCEDURE — G8417 CALC BMI ABV UP PARAM F/U: HCPCS | Performed by: INTERNAL MEDICINE

## 2020-02-04 RX ORDER — PANTOPRAZOLE SODIUM 40 MG/1
40 TABLET, DELAYED RELEASE ORAL 2 TIMES DAILY
Qty: 60 TABLET | Refills: 5 | Status: SHIPPED | OUTPATIENT
Start: 2020-02-04 | End: 2020-07-08

## 2020-02-04 RX ORDER — PREDNISONE 20 MG/1
40 TABLET ORAL DAILY
Qty: 10 TABLET | Refills: 0 | Status: SHIPPED | OUTPATIENT
Start: 2020-02-04 | End: 2020-02-09

## 2020-02-04 SDOH — ECONOMIC STABILITY: INCOME INSECURITY: HOW HARD IS IT FOR YOU TO PAY FOR THE VERY BASICS LIKE FOOD, HOUSING, MEDICAL CARE, AND HEATING?: SOMEWHAT HARD

## 2020-02-04 SDOH — ECONOMIC STABILITY: FOOD INSECURITY: WITHIN THE PAST 12 MONTHS, THE FOOD YOU BOUGHT JUST DIDN'T LAST AND YOU DIDN'T HAVE MONEY TO GET MORE.: SOMETIMES TRUE

## 2020-02-04 SDOH — ECONOMIC STABILITY: TRANSPORTATION INSECURITY
IN THE PAST 12 MONTHS, HAS THE LACK OF TRANSPORTATION KEPT YOU FROM MEDICAL APPOINTMENTS OR FROM GETTING MEDICATIONS?: NO

## 2020-02-04 SDOH — ECONOMIC STABILITY: FOOD INSECURITY: WITHIN THE PAST 12 MONTHS, YOU WORRIED THAT YOUR FOOD WOULD RUN OUT BEFORE YOU GOT MONEY TO BUY MORE.: SOMETIMES TRUE

## 2020-02-04 SDOH — ECONOMIC STABILITY: TRANSPORTATION INSECURITY
IN THE PAST 12 MONTHS, HAS LACK OF TRANSPORTATION KEPT YOU FROM MEETINGS, WORK, OR FROM GETTING THINGS NEEDED FOR DAILY LIVING?: NO

## 2020-02-04 ASSESSMENT — ENCOUNTER SYMPTOMS
RHINORRHEA: 0
SHORTNESS OF BREATH: 0
ABDOMINAL PAIN: 0
CHEST TIGHTNESS: 0
CONSTIPATION: 0
COUGH: 0
NAUSEA: 0
BLOOD IN STOOL: 0
SORE THROAT: 0
DIARRHEA: 0

## 2020-02-04 NOTE — PATIENT INSTRUCTIONS
SURVEY:    You may be receiving a survey from [employer] regarding your visit today. Please complete the survey to enable us to provide the highest quality of care to you and your family. If you cannot score us a very good on any question, please call the office to discuss how we could of made your experience a very good one. Thank you. 1. Epigastric abdominal pain  Change Protonix to 40 mg two times a day. Stop Pepcid since this is not working.  - pantoprazole (PROTONIX) 40 MG tablet; Take 1 tablet by mouth 2 times daily  Dispense: 60 tablet; Refill: 5    2. Gastroesophageal reflux disease without esophagitis    - pantoprazole (PROTONIX) 40 MG tablet; Take 1 tablet by mouth 2 times daily  Dispense: 60 tablet; Refill: 5    3. Arthritis of left acromioclavicular joint  Take Prednisone 20 m tablets  By mouth daily x 5 days. - predniSONE (DELTASONE) 20 MG tablet; Take 2 tablets by mouth daily for 5 days  Dispense: 10 tablet; Refill: 0    4. Mixed hypercholesterolemia and hypertriglyceridemia  Controlled on Lipitor. 5. Major depressive disorder with single episode, in full remission ([address]  Fluctuates on Zoloft. 6. Class 3 severe obesity due to excess calories without serious comorbidity with body mass index (BMI) of 45.0 to 49.9 in adult [facility])  Continue to work on weight loss, exercise, low carb diet. Kelvin Quintanilla was instructed to follow up in the clinic in 6 months for check up or as needed with any medical issues.

## 2020-02-04 NOTE — PROGRESS NOTES
current facility-administered medications on file prior to visit.          Lab Results       Component                Value               Date                       NA                       136                 02/03/2020                 K                        3.8                 02/03/2020                 CL                       99                  02/03/2020                 CO2                      23                  02/03/2020                 BUN                      11                  02/03/2020                 CREATININE               0.69                02/03/2020                 GLUCOSE                  101 (H)             02/03/2020                 CALCIUM                  9.9                 02/03/2020                 PROT                     8.4 (H)             02/03/2020                 LABALBU                  4.1                 02/03/2020                 BILITOT                  0.44                02/03/2020                 ALKPHOS                  99                  02/03/2020                 AST                      19                  02/03/2020                 ALT                      12                  02/03/2020                 LABGLOM                  >60                 02/03/2020                 GFRAA                    >60                 02/03/2020              Lab Results       Component                Value               Date                       LABA1C                   5.4                 02/03/2020            Lab Results       Component                Value               Date                       EAG                      108                 02/03/2020              Lab Results       Component                Value               Date                       CHOL                     214 (H)             02/03/2020                 CHOL                     202 (H)             07/30/2019                 CHOL                     246 (H)             01/07/2019            Lab Results Component                Value               Date                       TRIG                     148                 02/03/2020                 TRIG                     123                 07/30/2019                 TRIG                     158 (H)             01/07/2019            Lab Results       Component                Value               Date                       HDL                      68                  02/03/2020                 HDL                      64                  07/30/2019                 HDL                      70                  01/07/2019            Lab Results       Component                Value               Date                       LDLCHOLESTEROL           116                 02/03/2020                 LDLCHOLESTEROL           113                 07/30/2019                 LDLCHOLESTEROL           144 (H)             01/07/2019            Lab Results       Component                Value               Date                       VLDL                     NOT REPORTED        02/03/2020                 VLDL                     NOT REPORTED        07/30/2019                 VLDL                     NOT REPORTED        01/07/2019            Lab Results       Component                Value               Date                       CHOLHDLRATIO             3.1                 02/03/2020                 CHOLHDLRATIO             3.2                 07/30/2019                 CHOLHDLRATIO             3.5                 01/07/2019                            Hypertension   This is a chronic problem. The current episode started more than 1 year ago. The problem is unchanged. The problem is controlled. Pertinent negatives include no chest pain, neck pain, palpitations or shortness of breath. Past treatments include diuretics. The current treatment provides significant improvement. There are no compliance problems. There is no history of angina. Hyperlipidemia   This is a chronic problem.  The current episode started more than 1 year ago. The problem is controlled. Recent lipid tests were reviewed and are normal. Pertinent negatives include no chest pain, myalgias or shortness of breath. Current antihyperlipidemic treatment includes statins. The current treatment provides significant improvement of lipids. Gastroesophageal Reflux   She reports no abdominal pain, no chest pain, no coughing, no nausea or no sore throat. This is a chronic problem. The current episode started more than 1 year ago. The problem occurs occasionally. The problem has been unchanged. She has tried a PPI and a histamine-2 antagonist for the symptoms. The treatment provided mild relief. Shoulder Pain    The pain is present in the left shoulder. This is a new problem. Episode onset: 2 weeks. There has been no history of extremity trauma. The problem occurs constantly. The problem has been unchanged. The quality of the pain is described as aching. The symptoms are aggravated by activity. She has tried nothing for the symptoms. Family history does not include rheumatoid arthritis. Review of Systems   Constitutional: Negative. HENT: Negative for congestion, ear pain, rhinorrhea, sneezing and sore throat. Eyes: Negative for visual disturbance. Respiratory: Negative for cough, chest tightness and shortness of breath. Cardiovascular: Negative for chest pain and palpitations. Gastrointestinal: Negative for abdominal pain, blood in stool, constipation, diarrhea and nausea. Genitourinary: Negative for difficulty urinating, dysuria, frequency, menstrual problem and urgency. Musculoskeletal: Positive for arthralgias. Negative for joint swelling, myalgias and neck pain. Skin: Negative. Neurological: Negative for syncope. Psychiatric/Behavioral: Negative. Objective:   Physical Exam  Constitutional:       Appearance: She is well-developed. HENT:      Head: Atraumatic.    Eyes:      Conjunctiva/sclera: Conjunctivae normal.   Neck:      Musculoskeletal: Normal range of motion and neck supple. Cardiovascular:      Rate and Rhythm: Normal rate and regular rhythm. Heart sounds: Normal heart sounds. Pulmonary:      Effort: Pulmonary effort is normal.      Breath sounds: Normal breath sounds. Abdominal:      Palpations: Abdomen is soft. Tenderness: There is no abdominal tenderness. Musculoskeletal: Normal range of motion. General: Tenderness present. Comments: Pain over the [facility] joint with palpation. Lymphadenopathy:      Cervical: No cervical adenopathy. Skin:     Findings: No rash. Neurological:      Mental Status: She is alert. Psychiatric:         Behavior: Behavior normal.         Thought Content: Thought content normal.         Assessment:       Diagnosis Orders   1. Arthritis of left acromioclavicular joint  predniSONE (DELTASONE) 20 MG tablet   2. Epigastric abdominal pain  pantoprazole (PROTONIX) 40 MG tablet   3. Gastroesophageal reflux disease without esophagitis  pantoprazole (PROTONIX) 40 MG tablet   4. Mixed hypercholesterolemia and hypertriglyceridemia     5. Major depressive disorder with single episode, in full remission ([address]     6. Class 3 severe obesity due to excess calories without serious comorbidity with body mass index (BMI) of 45.0 to 49.9 in adult [facility])             Plan:      1. Epigastric abdominal pain  Change Protonix to 40 mg two times a day. Stop Pepcid since this is not working. Consider a referral to GI if symptoms continued. - pantoprazole (PROTONIX) 40 MG tablet; Take 1 tablet by mouth 2 times daily  Dispense: 60 tablet; Refill: 5    2. Gastroesophageal reflux disease without esophagitis    - pantoprazole (PROTONIX) 40 MG tablet; Take 1 tablet by mouth 2 times daily  Dispense: 60 tablet; Refill: 5    3. Arthritis of left acromioclavicular joint  Take Prednisone 20 m tablets  By mouth daily x 5 days.   - predniSONE (DELTASONE) 20 MG tablet; Take 2 tablets by mouth daily for 5 days  Dispense: 10 tablet; Refill: 0    4. Mixed hypercholesterolemia and hypertriglyceridemia  Controlled on Lipitor. 5. Major depressive disorder with single episode, in full remission ([address]  Fluctuates on Zoloft. 6. Class 3 severe obesity due to excess calories without serious comorbidity with body mass index (BMI) of 45.0 to 49.9 in adult [facility])  Continue to work on weight loss, exercise, low carb diet. Bishnubartolo Mayopatrica was instructed to follow up in the clinic in 6 months for check up or as needed with any medical issues.                       Cindy Reyes MD

## 2020-02-07 RX ORDER — POTASSIUM CHLORIDE 20 MEQ/1
TABLET, EXTENDED RELEASE ORAL
Qty: 30 TABLET | Refills: 5 | Status: SHIPPED | OUTPATIENT
Start: 2020-02-07 | End: 2020-07-08

## 2020-03-09 RX ORDER — HYDROCHLOROTHIAZIDE 25 MG/1
TABLET ORAL
Qty: 30 TABLET | Refills: 5 | Status: SHIPPED | OUTPATIENT
Start: 2020-03-09 | End: 2020-08-07

## 2020-03-16 ENCOUNTER — TELEPHONE (OUTPATIENT)
Dept: FAMILY MEDICINE CLINIC | Age: 50
End: 2020-03-16

## 2020-03-16 RX ORDER — AZITHROMYCIN 250 MG/1
TABLET, FILM COATED ORAL
Qty: 1 PACKET | Refills: 0 | Status: SHIPPED | OUTPATIENT
Start: 2020-03-16 | End: 2020-03-26

## 2020-06-09 RX ORDER — ATORVASTATIN CALCIUM 40 MG/1
TABLET, FILM COATED ORAL
Qty: 30 TABLET | Refills: 5 | Status: SHIPPED | OUTPATIENT
Start: 2020-06-09 | End: 2020-12-09

## 2020-07-08 RX ORDER — PANTOPRAZOLE SODIUM 40 MG/1
TABLET, DELAYED RELEASE ORAL
Qty: 60 TABLET | Refills: 5 | Status: SHIPPED | OUTPATIENT
Start: 2020-07-08 | End: 2021-01-05

## 2020-07-08 RX ORDER — POTASSIUM CHLORIDE 20 MEQ/1
TABLET, EXTENDED RELEASE ORAL
Qty: 30 TABLET | Refills: 5 | Status: SHIPPED | OUTPATIENT
Start: 2020-07-08 | End: 2021-01-05

## 2020-07-08 NOTE — TELEPHONE ENCOUNTER
Health Maintenance   Topic Date Due    Annual Wellness Visit (AWV)  05/29/2019    Cervical cancer screen  05/04/2020    Flu vaccine (1) 09/01/2020    Lipid screen  02/03/2021    Potassium monitoring  02/03/2021    Creatinine monitoring  02/03/2021    Diabetes screen  02/03/2023    DTaP/Tdap/Td vaccine (2 - Td) 05/28/2025    HIV screen  Completed    Hepatitis A vaccine  Aged Out    Hepatitis B vaccine  Aged Out    Hib vaccine  Aged Out    Meningococcal (ACWY) vaccine  Aged Out    Pneumococcal 0-64 years Vaccine  Aged Out             (applicable per patient's age: Cancer Screenings, Depression Screening, Fall Risk Screening, Immunizations)    Hemoglobin A1C (%)   Date Value   02/03/2020 5.4     LDL Cholesterol (mg/dL)   Date Value   02/03/2020 116     AST (U/L)   Date Value   02/03/2020 19     ALT (U/L)   Date Value   02/03/2020 12     BUN (mg/dL)   Date Value   02/03/2020 11      (goal A1C is < 7)   (goal LDL is <100) need 30-50% reduction from baseline     BP Readings from Last 3 Encounters:   02/04/20 139/74   12/31/19 126/80   08/01/19 120/80    (goal /80)      All Future Testing planned in [facility]:  Lab Frequency Next Occurrence       Next Visit Date:  Future Appointments   Date Time Provider Sheldon Berry   8/4/2020  2:00 PM Clarisse Owen MD [facility] [address] [facility]   8/10/2020  2:45 PM MD Mayuri Ordoñez  [address] [facility]            Patient Active Problem List:     Mixed hypercholesterolemia and hypertriglyceridemia     Anxiety     Depression     Urinary urgency     Hiatal hernia     Symptomatic cholelithiasis     Renal colic on right side     Gastroesophageal reflux disease without esophagitis     Urethral stenosis     Major depressive disorder with single episode, in full remission ([facility] [address]     Class 3 severe obesity without serious comorbidity in [facility])     Hyperglycemia

## 2020-08-04 ENCOUNTER — OFFICE VISIT (OUTPATIENT)
Dept: FAMILY MEDICINE CLINIC | Age: 50
End: 2020-08-04
Payer: MEDICARE

## 2020-08-04 VITALS
DIASTOLIC BLOOD PRESSURE: 82 MMHG | HEIGHT: 62 IN | TEMPERATURE: 96.6 F | HEART RATE: 75 BPM | SYSTOLIC BLOOD PRESSURE: 132 MMHG | WEIGHT: 262 LBS | BODY MASS INDEX: 48.21 KG/M2

## 2020-08-04 PROCEDURE — 1036F TOBACCO NON-USER: CPT | Performed by: INTERNAL MEDICINE

## 2020-08-04 PROCEDURE — G8427 DOCREV CUR MEDS BY ELIG CLIN: HCPCS | Performed by: INTERNAL MEDICINE

## 2020-08-04 PROCEDURE — G8417 CALC BMI ABV UP PARAM F/U: HCPCS | Performed by: INTERNAL MEDICINE

## 2020-08-04 PROCEDURE — 99214 OFFICE O/P EST MOD 30 MIN: CPT | Performed by: INTERNAL MEDICINE

## 2020-08-04 ASSESSMENT — ENCOUNTER SYMPTOMS
SORE THROAT: 0
COUGH: 0
CONSTIPATION: 0
SHORTNESS OF BREATH: 0
DIARRHEA: 0
ABDOMINAL PAIN: 0
BLOOD IN STOOL: 0
NAUSEA: 0
CHEST TIGHTNESS: 0
RHINORRHEA: 0

## 2020-08-04 ASSESSMENT — PATIENT HEALTH QUESTIONNAIRE - PHQ9
SUM OF ALL RESPONSES TO PHQ QUESTIONS 1-9: 0
1. LITTLE INTEREST OR PLEASURE IN DOING THINGS: 0
SUM OF ALL RESPONSES TO PHQ QUESTIONS 1-9: 0
2. FEELING DOWN, DEPRESSED OR HOPELESS: 0
SUM OF ALL RESPONSES TO PHQ9 QUESTIONS 1 & 2: 0

## 2020-08-04 NOTE — PROGRESS NOTES
Subjective:      Patient ID: Carol Villareal is a 52 y.o. female. Jose Armando Shoemaker presents for a check up on her medical conditions Hyperglycemia, Anxiety, HTN, Hyperlipidemia, GERD. Jose Armando Shoemaker denies new problems. Medications were reviewed with Jose Armando Shoemaker, she is  tolerating the medication. Bowels are regular. There has not been rectal bleeding. True Sahara denies urinary complications, the urine stream is good. True Sahara denies chest pain and denies increasing shortness of breath. Labs from  reviewed. Depression / anxiety :  Controlled on the current medication. Follows with Psychiatry every 3 months. Past Medical History:  No date:  Anxiety  No date: Asthma  5/15/2018: Class 3 obesity due to excess calories without serious   comorbidity with body mass index (BMI) of 45.0 to 49.9 in adult  No date: Crohn's disease ([facility]  No date: Depression  No date: GERD (gastroesophageal reflux disease)  No date: Hyperlipidemia  No date: Hypertension  No date: Wears glasses    Past Surgical History:  No date:  SECTION  9/25/15: CHOLECYSTECTOMY, LAPAROSCOPIC  2015: CYSTOSCOPY  No date: MOUTH SURGERY    Social History    Socioeconomic History      Marital status:       Spouse name: Not on file      Number of children: Not on file      Years of education: Not on file      Highest education level: Not on file    Occupational History        Employer: UNEMPLOYED    Social Needs      Financial resource strain: Somewhat hard      Food insecurity        Worry: Sometimes true        Inability: Sometimes true      Transportation needs        Medical: No        Non-medical: No    Tobacco Use      Smoking status: Never Smoker      Smokeless tobacco: Never Used    Substance and Sexual Activity      Alcohol use: No        Comment: occassional      Drug use: No      Sexual activity: Yes        Partners: Male    Lifestyle      Physical activity        Days per week: Not on file        Minutes per session: Not on file Stress: Not on file    Relationships      Social connections        Talks on phone: Not on file        Gets together: Not on file        Attends [employer]ism service: Not on file        Active member of club or organization: Not on file        Attends meetings of clubs or organizations: Not on file        Relationship status: Not on file      Intimate partner violence        Fear of current or ex partner: Not on file        Emotionally abused: Not on file        Physically abused: Not on file        Forced sexual activity: Not on file    Other Topics      Concerns:        Not on file    Social History Narrative      Not on file      Current Outpatient Medications on File Prior to Visit:  potassium chloride (KLOR-CON M) 20 MEQ extended release tablet, TAKE 1 TABLET BY MOUTH EVERY DAY, Disp: 30 tablet, Rfl: 5  pantoprazole (PROTONIX) 40 MG tablet, TAKE 1 TABLET BY MOUTH TWICE DAILY, Disp: 60 tablet, Rfl: 5  atorvastatin (LIPITOR) 40 MG tablet, TAKE 1 TABLET BY MOUTH EVERY DAY, Disp: 30 tablet, Rfl: 5  hydroCHLOROthiazide (HYDRODIURIL) 25 MG tablet, TAKE 1 TABLET BY MOUTH EVERY DAY, Disp: 30 tablet, Rfl: 5  albuterol sulfate  (90 Base) MCG/ACT inhaler, Inhale 2 puffs into the lungs every 4 hours as needed for Wheezing or Shortness of Breath (Space out to every 6 hours as symptoms improve), Disp: 1 Inhaler, Rfl: 0  sertraline (ZOLOFT) 100 MG tablet, TAKE 2 TABLETS BY MOUTH EVERY DAY, Disp: 30 tablet, Rfl: 5  ALPRAZolam (XANAX) 0.5 MG tablet, TAKE 1 TABLET BY MOUTH TWICE DAILY AS NEEDED, Disp: , Rfl: 2  norethindrone-ethinyl estradiol (ORTHO-NOVUM 7/7/7, 28,) 0.5/0.75/1-35 MG-MCG per tablet, Take 1 tablet by mouth daily. , Disp: 1 packet, Rfl: 3  ondansetron (ZOFRAN) 4 MG tablet, TAKE 1 TABLET BY MOUTH EVERY 8 HOURS AS NEEDED FOR NAUSEA / vomiting, Disp: 40 tablet, Rfl: 1  Blood Pressure Monitor KIT, Monitor BP daily. , Disp: 1 kit, Rfl: 0    No current facility-administered medications on file prior to visit. Lab Results       Component                Value               Date                       NA                       136                 02/03/2020                 K                        3.8                 02/03/2020                 CL                       99                  02/03/2020                 CO2                      23                  02/03/2020                 BUN                      11                  02/03/2020                 CREATININE               0.69                02/03/2020                 GLUCOSE                  101 (H)             02/03/2020                 CALCIUM                  9.9                 02/03/2020                 PROT                     8.4 (H)             02/03/2020                 LABALBU                  4.1                 02/03/2020                 BILITOT                  0.44                02/03/2020                 ALKPHOS                  99                  02/03/2020                 AST                      19                  02/03/2020                 ALT                      12                  02/03/2020                 LABGLOM                  >60                 02/03/2020                 GFRAA                    >60                 02/03/2020              Lab Results       Component                Value               Date                       LABA1C                   5.4                 02/03/2020            Lab Results       Component                Value               Date                       EAG                      108                 02/03/2020              Lab Results       Component                Value               Date                       CHOL                     214 (H)             02/03/2020                 CHOL                     202 (H)             07/30/2019                 CHOL                     246 (H)             01/07/2019            Lab Results       Component                Value               Date controlled. Recent lipid tests were reviewed and are normal. Pertinent negatives include no chest pain, myalgias or shortness of breath. Current antihyperlipidemic treatment includes statins. The current treatment provides significant improvement of lipids. There are no compliance problems. Gastroesophageal Reflux   She reports no abdominal pain, no chest pain, no coughing, no nausea or no sore throat. This is a chronic problem. The current episode started more than 1 year ago. The problem occurs rarely. The problem has been unchanged. The treatment provided significant relief. Review of Systems   Constitutional: Negative. HENT: Negative for congestion, ear pain, rhinorrhea, sneezing and sore throat. Eyes: Negative for visual disturbance. Respiratory: Negative for cough, chest tightness and shortness of breath. Cardiovascular: Negative for chest pain and palpitations. Gastrointestinal: Negative for abdominal pain, blood in stool, constipation, diarrhea and nausea. Genitourinary: Negative for difficulty urinating, dysuria, frequency, menstrual problem and urgency. Musculoskeletal: Negative for arthralgias, joint swelling, myalgias and neck pain. Skin: Negative. Neurological: Negative for syncope. Psychiatric/Behavioral: Negative. Objective:   Physical Exam  Constitutional:       Appearance: She is well-developed. HENT:      Head: Atraumatic. Eyes:      Conjunctiva/sclera: Conjunctivae normal.   Neck:      Musculoskeletal: Normal range of motion and neck supple. Cardiovascular:      Rate and Rhythm: Normal rate and regular rhythm. Heart sounds: Normal heart sounds. Pulmonary:      Effort: Pulmonary effort is normal.      Breath sounds: Normal breath sounds. Abdominal:      Palpations: Abdomen is soft. Tenderness: There is no abdominal tenderness. Musculoskeletal: Normal range of motion. Lymphadenopathy:      Cervical: No cervical adenopathy.    Skin: Findings: No rash. Comments: Spider veins both legs, L>R   Neurological:      Mental Status: She is alert. Psychiatric:         Behavior: Behavior normal.         Thought Content: Thought content normal.         Assessment:       Diagnosis Orders   1. Mixed hypercholesterolemia and hypertriglyceridemia  Comprehensive Metabolic Panel    Lipid Panel   2. Hyperglycemia  Comprehensive Metabolic Panel    Hemoglobin A1C   3. Gastroesophageal reflux disease without esophagitis     4. Class 3 severe obesity due to excess calories without serious comorbidity with body mass index (BMI) of 45.0 to 49.9 in adult ([facility] [address] [address]     5. Depression with anxiety             Plan:      1. Mixed hypercholesterolemia and hypertriglyceridemia  Controlled on Lipitor. Obtain labs approximately one week prior to the office appointment. Please fast for 12 hours prior to obtaining the labs. Water or black coffee (no cream or sugar) is allowed prior to the the labs. - Comprehensive Metabolic Panel; Future  - Lipid Panel; Future    2. Hyperglycemia  Hgba1c with next labs. - Comprehensive Metabolic Panel; Future  - Hemoglobin A1C; Future    3. Gastroesophageal reflux disease without esophagitis  Controlled on Protonix. 4. Class 3 severe obesity due to excess calories without serious comorbidity with body mass index (BMI) of 45.0 to 49.9 in adult [facility])  Work on carb restricted diet and exercise daily. 5. Depression with anxiety  Stable on the current medication. Follow up with Psychiatry. Marie Felix was instructed to follow up in the clinic in 6 months for check up or as needed with any medical issues.                       Abbey Zelaya MD

## 2020-08-04 NOTE — PATIENT INSTRUCTIONS
SURVEY:    You may be receiving a survey from [employer] regarding your visit today. Please complete the survey to enable us to provide the highest quality of care to you and your family. If you cannot score us a very good on any question, please call the office to discuss how we could have made your experience a very good one. Thank you. 1. Mixed hypercholesterolemia and hypertriglyceridemia  Controlled on Lipitor. Obtain labs approximately one week prior to the office appointment. Please fast for 12 hours prior to obtaining the labs. Water or black coffee (no cream or sugar) is allowed prior to the the labs. - Comprehensive Metabolic Panel; Future  - Lipid Panel; Future    2. Hyperglycemia  Hgba1c with next labs. - Comprehensive Metabolic Panel; Future  - Hemoglobin A1C; Future    3. Gastroesophageal reflux disease without esophagitis  Controlled on Protonix. 4. Class 3 severe obesity due to excess calories without serious comorbidity with body mass index (BMI) of 45.0 to 49.9 in adult [facility])  Work on carb restricted diet and exercise daily. 5. Depression with anxiety  Stable on the current medication. Follow up with Psychiatry. Ariela Zamora was instructed to follow up in the clinic in 6 months for check up or as needed with any medical issues.

## 2020-08-07 RX ORDER — HYDROCHLOROTHIAZIDE 25 MG/1
TABLET ORAL
Qty: 30 TABLET | Refills: 5 | Status: SHIPPED | OUTPATIENT
Start: 2020-08-07 | End: 2021-02-08

## 2020-08-07 NOTE — TELEPHONE ENCOUNTER
Health Maintenance   Topic Date Due    Annual Wellness Visit (AWV)  05/29/2019    Cervical cancer screen  05/04/2020    Flu vaccine (1) 09/01/2020    Lipid screen  02/03/2021    Potassium monitoring  02/03/2021    Creatinine monitoring  02/03/2021    Diabetes screen  02/03/2023    DTaP/Tdap/Td vaccine (2 - Td) 05/28/2025    HIV screen  Completed    Hepatitis A vaccine  Aged Out    Hepatitis B vaccine  Aged Out    Hib vaccine  Aged Out    Meningococcal (ACWY) vaccine  Aged Out    Pneumococcal 0-64 years Vaccine  Aged Out             (applicable per patient's age: Cancer Screenings, Depression Screening, Fall Risk Screening, Immunizations)    Hemoglobin A1C (%)   Date Value   02/03/2020 5.4     LDL Cholesterol (mg/dL)   Date Value   02/03/2020 116     AST (U/L)   Date Value   02/03/2020 19     ALT (U/L)   Date Value   02/03/2020 12     BUN (mg/dL)   Date Value   02/03/2020 11      (goal A1C is < 7)   (goal LDL is <100) need 30-50% reduction from baseline     BP Readings from Last 3 Encounters:   08/04/20 132/82   02/04/20 139/74   12/31/19 126/80    (goal /80)      All Future Testing planned in [facility]:  Lab Frequency Next Occurrence   Comprehensive Metabolic Panel Once 93/80/5833   Hemoglobin A1C Once 02/04/2021   Lipid Panel Once 02/04/2021       Next Visit Date:  Future Appointments   Date Time Provider Sheldon Berry   10/20/2020  3:00 PM Corry Nelson MD [id number]   2/9/2021  1:45 PM Corry Nelson MD Terri Fuelling [address] [facility]            Patient Active Problem List:     Mixed hypercholesterolemia and hypertriglyceridemia     Anxiety     Depression     Urinary urgency     Hiatal hernia     Symptomatic cholelithiasis     Renal colic on right side     Gastroesophageal reflux disease without esophagitis     Urethral stenosis     Major depressive disorder with single episode, in full remission ([facility] [address]     Class 3 severe obesity without serious comorbidity in [facility])     Hyperglycemia

## 2020-10-20 ENCOUNTER — OFFICE VISIT (OUTPATIENT)
Dept: FAMILY MEDICINE CLINIC | Age: 50
End: 2020-10-20
Payer: MEDICARE

## 2020-10-20 VITALS
TEMPERATURE: 97.1 F | HEART RATE: 78 BPM | HEIGHT: 62 IN | SYSTOLIC BLOOD PRESSURE: 124 MMHG | WEIGHT: 265 LBS | BODY MASS INDEX: 48.76 KG/M2 | DIASTOLIC BLOOD PRESSURE: 72 MMHG

## 2020-10-20 PROCEDURE — G0438 PPPS, INITIAL VISIT: HCPCS | Performed by: INTERNAL MEDICINE

## 2020-10-20 PROCEDURE — G8484 FLU IMMUNIZE NO ADMIN: HCPCS | Performed by: INTERNAL MEDICINE

## 2020-10-20 PROCEDURE — 3017F COLORECTAL CA SCREEN DOC REV: CPT | Performed by: INTERNAL MEDICINE

## 2020-10-20 ASSESSMENT — PATIENT HEALTH QUESTIONNAIRE - PHQ9
SUM OF ALL RESPONSES TO PHQ QUESTIONS 1-9: 2
SUM OF ALL RESPONSES TO PHQ9 QUESTIONS 1 & 2: 2
SUM OF ALL RESPONSES TO PHQ QUESTIONS 1-9: 2
1. LITTLE INTEREST OR PLEASURE IN DOING THINGS: 1
SUM OF ALL RESPONSES TO PHQ QUESTIONS 1-9: 2
2. FEELING DOWN, DEPRESSED OR HOPELESS: 1

## 2020-10-20 ASSESSMENT — LIFESTYLE VARIABLES: HOW OFTEN DO YOU HAVE A DRINK CONTAINING ALCOHOL: 0

## 2020-10-20 NOTE — PROGRESS NOTES
Medicare Annual Wellness Visit  Name: Eugene Quintanilla Date: 10/20/2020   MRN: [id number] Sex: Female   Age: 48 y.o. Ethnicity: Non-/Non    : 1970 Race: Bryan Lisa is here for Medicare AWV    Screenings for behavioral, psychosocial and functional/safety risks, and cognitive dysfunction are all negative except as indicated below. These results, as well as other patient data from the [address] form, are documented in Flowsheets linked to this Encounter. Allergies   Allergen Reactions    Other Swelling     EGG PLANT    Pcn [Penicillins] Rash       Prior to Visit Medications    Medication Sig Taking? Authorizing Provider   hydroCHLOROthiazide (HYDRODIURIL) 25 MG tablet TAKE 1 TABLET BY MOUTH EVERY DAY Yes Boris Robles MD   potassium chloride (KLOR-CON M) 20 MEQ extended release tablet TAKE 1 TABLET BY MOUTH EVERY DAY Yes Boris Robles MD   pantoprazole (PROTONIX) 40 MG tablet TAKE 1 TABLET BY MOUTH TWICE DAILY Yes Boris Robles MD   atorvastatin (LIPITOR) 40 MG tablet TAKE 1 TABLET BY MOUTH EVERY DAY Yes Boris Robles MD   ondansetron (ZOFRAN) 4 MG tablet TAKE 1 TABLET BY MOUTH EVERY 8 HOURS AS NEEDED FOR NAUSEA / vomiting Yes Devon Dela Cruz MD   sertraline (ZOLOFT) 100 MG tablet TAKE 2 TABLETS BY MOUTH EVERY DAY Yes Boris Robles MD   ALPRAZolam (XANAX) 0.5 MG tablet TAKE 1 TABLET BY MOUTH TWICE DAILY AS NEEDED Yes Historical Provider, MD   norethindrone-ethinyl estradiol ([address] , 28,) 0.5/0.75/1-35 MG-MCG per tablet Take 1 tablet by mouth daily. Yes Boris Robles MD   albuterol sulfate  (90 Base) MCG/ACT inhaler Inhale 2 puffs into the lungs every 4 hours as needed for Wheezing or Shortness of Breath (Space out to every 6 hours as symptoms improve)  John Manley MD   Blood Pressure Monitor KIT Monitor BP daily.   Socorro Gonzalez MD       Past Medical History:   Diagnosis Date    Anxiety     Asthma     Class 3 obesity due to excess calories without serious comorbidity with body mass index (BMI) of 45.0 to 49.9 in adult 5/15/2018    Crohn's disease ([facility] [address]     Depression     GERD (gastroesophageal reflux disease)     Hyperlipidemia     Hypertension     Wears glasses        Past Surgical History:   Procedure Laterality Date     SECTION      CHOLECYSTECTOMY, LAPAROSCOPIC  9/25/15    CYSTOSCOPY  2015    MOUTH SURGERY         Family History   Problem Relation Age of Onset    Diabetes Mother     Cancer Mother     Depression Father     High Blood Pressure Father        CareTeam (Including outside providers/suppliers regularly involved in providing care):   Patient Care Team:  Rashaun Peña MD as PCP - General (Internal Medicine)  Rashaun Peña MD as PCP - [facility] Empaneled Provider  Zay Lima (Psychiatry)  Reece Kincaid APRN - CNP    Wt Readings from Last 3 Encounters:   10/20/20 265 lb (120.2 kg)   20 262 lb (118.8 kg)   20 253 lb (114.8 kg)     Vitals:    10/20/20 1433   BP: 124/72   Pulse: 78   Temp: 97.1 °F (36.2 °C)   Weight: 265 lb (120.2 kg)   Height: 5' 2\" (1.575 m)     Body mass index is 48.47 kg/m². Based upon direct observation of the patient, evaluation of cognition reveals recent and remote memory intact. Patient's complete Health Risk Assessment and screening values have been reviewed and are found in Flowsheets. The following problems were reviewed today and where indicated follow up appointments were made and/or referrals ordered. Positive Risk Factor Screenings with Interventions:     General Health and ACP:  General  In general, how would you say your health is?: Good  In the past 7 days, have you experienced any of the following?  New or Increased Pain, New or Increased Fatigue, Loneliness, Social Isolation, Stress or Anger?: (!) New or Increased Pain, New or Increased Fatigue, Stress, Anger  Do you get the social and emotional support that you need?: Yes  Do you have a Living Will?: Colon cancer screen colonoscopy  08/19/2020    Flu vaccine (1) 09/01/2020    Lipid screen  02/03/2021    Potassium monitoring  02/03/2021    Creatinine monitoring  02/03/2021    Breast cancer screen  03/13/2021    Diabetes screen  02/03/2023    DTaP/Tdap/Td vaccine (2 - Td) 05/28/2025    HIV screen  Completed    Hepatitis A vaccine  Aged Out    Hepatitis B vaccine  Aged Out    Hib vaccine  Aged Out    Meningococcal (ACWY) vaccine  Aged Out    Pneumococcal 0-64 years Vaccine  Aged Out     Recommendations for [employer] Due: see orders and patient instructions/AVS.  . Recommended screening schedule for the next 5-10 years is provided to the patient in written form: see Patient Instructions/AVS.    Jus Lake was seen today for medicare aw. Diagnoses and all orders for this visit:    Routine general medical examination at a health care facility  See above concerns and discussion. Colon cancer screening  -     Cologuard (For External Results Only); Future    Jus Lake was instructed to follow up in the clinic in 1 year  for check up or as needed with any medical issues.

## 2020-12-09 RX ORDER — ATORVASTATIN CALCIUM 40 MG/1
TABLET, FILM COATED ORAL
Qty: 30 TABLET | Refills: 5 | Status: SHIPPED | OUTPATIENT
Start: 2020-12-09 | End: 2021-06-08

## 2020-12-09 NOTE — TELEPHONE ENCOUNTER
Health Maintenance   Topic Date Due    Cervical cancer screen  05/04/2020    Shingles Vaccine (1 of 2) 08/19/2020    Flu vaccine (1) 09/01/2020    Lipid screen  02/03/2021    Potassium monitoring  02/03/2021    Creatinine monitoring  02/03/2021    Breast cancer screen  03/13/2021    Annual Wellness Visit (AWV)  10/21/2021    Diabetes screen  02/03/2023    Colon cancer screen fecal DNA test (Cologuard)  11/04/2023    DTaP/Tdap/Td vaccine (2 - Td) 05/28/2025    HIV screen  Completed    Hepatitis A vaccine  Aged Out    Hepatitis B vaccine  Aged Out    Hib vaccine  Aged Out    Meningococcal (ACWY) vaccine  Aged Out    Pneumococcal 0-64 years Vaccine  Aged Out             (applicable per patient's age: Cancer Screenings, Depression Screening, Fall Risk Screening, Immunizations)    Hemoglobin A1C (%)   Date Value   02/03/2020 5.4     LDL Cholesterol (mg/dL)   Date Value   02/03/2020 116     AST (U/L)   Date Value   02/03/2020 19     ALT (U/L)   Date Value   02/03/2020 12     BUN (mg/dL)   Date Value   02/03/2020 11      (goal A1C is < 7)   (goal LDL is <100) need 30-50% reduction from baseline     BP Readings from Last 3 Encounters:   10/20/20 124/72   08/04/20 132/82   02/04/20 139/74    (goal /80)      All Future Testing planned in [facility]:  Lab Frequency Next Occurrence   Comprehensive Metabolic Panel Once 97/78/8749   Hemoglobin A1C Once 02/04/2021   Lipid Panel Once 02/04/2021       Next Visit Date:  Future Appointments   Date Time Provider Sheldon Berry   12/11/2020  1:15 PM MD Kim Salas  [facility]   2/9/2021  1:45 PM MD Kim Salas  [facility]   10/21/2021  1:00 PM MD Donald Salas Plan            Patient Active Problem List:     Mixed hypercholesterolemia and hypertriglyceridemia     Anxiety     Depression     Urinary urgency     Hiatal hernia     Symptomatic cholelithiasis     Renal colic on right side     Gastroesophageal reflux disease without esophagitis     Urethral stenosis     Major depressive disorder with single episode, in full remission ([facility] [address]     Class 3 severe obesity without serious comorbidity in adult [facility])     Hyperglycemia

## 2020-12-11 ENCOUNTER — HOSPITAL ENCOUNTER (OUTPATIENT)
Age: 50
Discharge: HOME | End: 2020-12-11
Payer: MEDICARE

## 2020-12-11 ENCOUNTER — HOSPITAL ENCOUNTER (OUTPATIENT)
Dept: GENERAL RADIOLOGY | Age: 50
Discharge: HOME | End: 2020-12-13
Payer: MEDICARE

## 2020-12-11 ENCOUNTER — OFFICE VISIT (OUTPATIENT)
Dept: FAMILY MEDICINE CLINIC | Age: 50
End: 2020-12-11
Payer: MEDICARE

## 2020-12-11 ENCOUNTER — HOSPITAL ENCOUNTER (OUTPATIENT)
Age: 50
Discharge: HOME | End: 2020-12-13
Payer: MEDICARE

## 2020-12-11 VITALS
TEMPERATURE: 96.4 F | WEIGHT: 269 LBS | HEIGHT: 62 IN | DIASTOLIC BLOOD PRESSURE: 88 MMHG | SYSTOLIC BLOOD PRESSURE: 144 MMHG | HEART RATE: 83 BPM | BODY MASS INDEX: 49.5 KG/M2

## 2020-12-11 LAB
-: NORMAL
AMORPHOUS: NORMAL
BACTERIA: NORMAL
BILIRUBIN URINE: NEGATIVE
CASTS UA: NORMAL /LPF
COLOR: YELLOW
COMMENT UA: ABNORMAL
CRYSTALS, UA: NORMAL /HPF
EPITHELIAL CELLS UA: NORMAL /HPF
GLUCOSE URINE: NEGATIVE
KETONES, URINE: NEGATIVE
LEUKOCYTE ESTERASE, URINE: NEGATIVE
MUCUS: NORMAL
NITRITE, URINE: NEGATIVE
OTHER OBSERVATIONS UA: NORMAL
PH UA: 6 (ref 5–8)
PROTEIN UA: ABNORMAL
RBC UA: NORMAL /HPF (ref 0–2)
RENAL EPITHELIAL, UA: NORMAL /HPF
SPECIFIC GRAVITY UA: 1.02 (ref 1–1.03)
TRICHOMONAS: NORMAL
TURBIDITY: CLEAR
URINE HGB: ABNORMAL
UROBILINOGEN, URINE: NORMAL
WBC UA: NORMAL /HPF
YEAST: NORMAL

## 2020-12-11 PROCEDURE — G8428 CUR MEDS NOT DOCUMENT: HCPCS | Performed by: INTERNAL MEDICINE

## 2020-12-11 PROCEDURE — G8484 FLU IMMUNIZE NO ADMIN: HCPCS | Performed by: INTERNAL MEDICINE

## 2020-12-11 PROCEDURE — G8417 CALC BMI ABV UP PARAM F/U: HCPCS | Performed by: INTERNAL MEDICINE

## 2020-12-11 PROCEDURE — 1036F TOBACCO NON-USER: CPT | Performed by: INTERNAL MEDICINE

## 2020-12-11 PROCEDURE — 81001 URINALYSIS AUTO W/SCOPE: CPT

## 2020-12-11 PROCEDURE — 72110 X-RAY EXAM L-2 SPINE 4/>VWS: CPT

## 2020-12-11 PROCEDURE — 3017F COLORECTAL CA SCREEN DOC REV: CPT | Performed by: INTERNAL MEDICINE

## 2020-12-11 PROCEDURE — 99213 OFFICE O/P EST LOW 20 MIN: CPT | Performed by: INTERNAL MEDICINE

## 2020-12-11 RX ORDER — MELOXICAM 15 MG/1
15 TABLET ORAL DAILY
Qty: 10 TABLET | Refills: 0 | Status: SHIPPED | OUTPATIENT
Start: 2020-12-11 | End: 2021-03-02

## 2020-12-11 RX ORDER — TIZANIDINE 4 MG/1
4 TABLET ORAL 3 TIMES DAILY
Qty: 20 TABLET | Refills: 0 | Status: SHIPPED | OUTPATIENT
Start: 2020-12-11 | End: 2021-03-02

## 2020-12-11 ASSESSMENT — ENCOUNTER SYMPTOMS
DIARRHEA: 0
CONSTIPATION: 0
SORE THROAT: 0
COUGH: 0
SHORTNESS OF BREATH: 0
RHINORRHEA: 0
BLOOD IN STOOL: 0
NAUSEA: 0
ABDOMINAL PAIN: 0
BACK PAIN: 1
CHEST TIGHTNESS: 0

## 2020-12-11 NOTE — PROGRESS NOTES
present. Comments: Pain over the right SI joint with palpation. Lymphadenopathy:      Cervical: No cervical adenopathy. Skin:     Findings: No rash. Comments: Pain over a the right side of the abdomen with pressing on the skin. NO rash is seen. Neurological:      Mental Status: She is alert. Psychiatric:         Behavior: Behavior normal.         Thought Content: Thought content normal.         Assessment:       Diagnosis Orders   1. Sacroiliitis (HCC)  meloxicam (MOBIC) 15 MG tablet    tiZANidine (ZANAFLEX) 4 MG tablet    XR LUMBAR SPINE (2-3 VIEWS)   2. Neuropathy     3. Chronic midline low back pain without sciatica  XR LUMBAR SPINE (2-3 VIEWS)   4. Urinary urgency  Urinalysis Reflex to Culture           Plan:      1. Sacroiliitis (HCC)  Take Meloxicam 15 mg daily with food. Take Zanflex 4 mg every 8 hours as needed for spasms. - meloxicam (MOBIC) 15 MG tablet; Take 1 tablet by mouth daily  Dispense: 10 tablet; Refill: 0  - tiZANidine (ZANAFLEX) 4 MG tablet; Take 1 tablet by mouth 3 times daily  Dispense: 20 tablet; Refill: 0  - XR LUMBAR SPINE (2-3 VIEWS); Future    2. Neuropathy  Zay Motta states this has been for a few weeks otherwise I would have thought it may have been shingles. Told to watch for rash and call. Will see how this responds to Meloxicam.    3. Chronic midline low back pain without sciatica  Xray the lumbar spine. - XR LUMBAR SPINE (2-3 VIEWS); Future    Jolanta Murray is instructed to return to the clinic if the symptoms continue or worsen. Jolantanadeem Murray  was also instructed to go to the emergency room department if the symptoms significantly worsen before an appointment can be made.           Chance Perez MD

## 2020-12-11 NOTE — PATIENT INSTRUCTIONS
Survey: You may be receiving a survey from [employer] regarding your visit today. You may get this in the mail, through your MyChart or in your email. Please complete the survey to enable us to provide the highest quality of care to you and your family. Please also, mention our names. If you cannot score us as very good (5 Stars) on any question, please feel free to call the office to discuss how we could have made your experience exceptional.      Thank You! Dr. Annie Tirado MD    [facility], [address] [place] [address], [address] [place] [address], [facility]    Ritesh Jerez CMA    1. Sacroiliitis (HCC)  Take Meloxicam 15 mg daily with food. Take Zanflex 4 mg every 8 hours as needed for spasms. - meloxicam (MOBIC) 15 MG tablet; Take 1 tablet by mouth daily  Dispense: 10 tablet; Refill: 0  - tiZANidine (ZANAFLEX) 4 MG tablet; Take 1 tablet by mouth 3 times daily  Dispense: 20 tablet; Refill: 0  - XR LUMBAR SPINE (2-3 VIEWS); Future    2. Neuropathy  Nitin states this has been for a few weeks otherwise I would have thought it may have been shingles. Told to watch for rash and call. Will see how this responds to Meloxicam.    3. Chronic midline low back pain without sciatica  Xray the lumbar spine. - XR LUMBAR SPINE (2-3 VIEWS); Future    Rashad Wheatley is instructed to return to the clinic if the symptoms continue or worsen. Rashad Wheatley  was also instructed to go to the emergency room department if the symptoms significantly worsen before an appointment can be made.

## 2020-12-14 ENCOUNTER — TELEPHONE (OUTPATIENT)
Dept: FAMILY MEDICINE CLINIC | Age: 50
End: 2020-12-14

## 2020-12-14 NOTE — TELEPHONE ENCOUNTER
Seen Friday 12/11/20 for mid low back pain, sacrioloitis. Treated on meloxicam and zanaflex. Patient calling for test results. Has viewed on my chart questions \"spina bifida occulta\" on xray.        Health Maintenance   Topic Date Due    Cervical cancer screen  05/04/2020    Shingles Vaccine (1 of 2) 08/19/2020    Flu vaccine (1) 09/01/2020    Lipid screen  02/03/2021    Potassium monitoring  02/03/2021    Creatinine monitoring  02/03/2021    Breast cancer screen  03/13/2021    Annual Wellness Visit (AWV)  10/21/2021    Diabetes screen  02/03/2023    Colon cancer screen fecal DNA test (Cologuard)  11/04/2023    DTaP/Tdap/Td vaccine (2 - Td) 05/28/2025    HIV screen  Completed    Hepatitis A vaccine  Aged Out    Hepatitis B vaccine  Aged Out    Hib vaccine  Aged Out    Meningococcal (ACWY) vaccine  Aged Out    Pneumococcal 0-64 years Vaccine  Aged Out             (applicable per patient's age: Cancer Screenings, Depression Screening, Fall Risk Screening, Immunizations)    Hemoglobin A1C (%)   Date Value   02/03/2020 5.4     LDL Cholesterol (mg/dL)   Date Value   02/03/2020 116     AST (U/L)   Date Value   02/03/2020 19     ALT (U/L)   Date Value   02/03/2020 12     BUN (mg/dL)   Date Value   02/03/2020 11      (goal A1C is < 7)   (goal LDL is <100) need 30-50% reduction from baseline     BP Readings from Last 3 Encounters:   12/11/20 (!) 144/88   10/20/20 124/72   08/04/20 132/82    (goal /80)      All Future Testing planned in [facility]:  Lab Frequency Next Occurrence   Comprehensive Metabolic Panel Once 01/26/2420   Hemoglobin A1C Once 02/04/2021   Lipid Panel Once 02/04/2021       Next Visit Date:  Future Appointments   Date Time Provider Sheldon Berry   2/9/2021  1:45 PM MD Kim Wheeler  [facility]   10/21/2021  1:00 PM MD Cathy Wheeler [facility] [address] [facility]            Patient Active Problem List:     Mixed hypercholesterolemia and hypertriglyceridemia     Anxiety

## 2020-12-14 NOTE — TELEPHONE ENCOUNTER
This is a congenital defect and not a the level she is having pain. Consider a referral to interventional pain mgt.

## 2020-12-15 NOTE — TELEPHONE ENCOUNTER
Pt informed This is a congenital defect and not a the level she is having pain. Consider a referral to interventional pain mgt. Pt states she is not interested in the pain mgmt referral at this time.

## 2020-12-21 ENCOUNTER — TELEPHONE (OUTPATIENT)
Dept: FAMILY MEDICINE CLINIC | Age: 50
End: 2020-12-21

## 2020-12-21 NOTE — TELEPHONE ENCOUNTER
Pt called and complains of persistent side pain on left side under rib cage. Pt is concerned about possible issue with the liver. Pt requests CT and labs be ordered to further investigate. I mentioned pain mtg to patient as recommended in last telephone encounter, and patient wishes for additional testing first. Advised pt Dr Flo Velasco is on vacation and response would be a few days. Pt voices understanding.

## 2020-12-22 ENCOUNTER — HOSPITAL ENCOUNTER (OUTPATIENT)
Age: 50
Discharge: HOME | End: 2020-12-22
Payer: MEDICARE

## 2020-12-22 LAB
ALBUMIN SERPL-MCNC: 3.9 G/DL (ref 3.5–5.2)
ALBUMIN/GLOBULIN RATIO: ABNORMAL (ref 1–2.5)
ALP BLD-CCNC: 137 U/L (ref 35–104)
ALT SERPL-CCNC: 18 U/L (ref 5–33)
ANION GAP SERPL CALCULATED.3IONS-SCNC: 10 MMOL/L (ref 9–17)
AST SERPL-CCNC: 28 U/L
BILIRUB SERPL-MCNC: 0.45 MG/DL (ref 0.3–1.2)
BUN BLDV-MCNC: 9 MG/DL (ref 6–20)
BUN/CREAT BLD: 13 (ref 9–20)
CALCIUM SERPL-MCNC: 8.9 MG/DL (ref 8.6–10.4)
CHLORIDE BLD-SCNC: 99 MMOL/L (ref 98–107)
CHOLESTEROL/HDL RATIO: 3
CHOLESTEROL: 201 MG/DL
CO2: 27 MMOL/L (ref 20–31)
CREAT SERPL-MCNC: 0.69 MG/DL (ref 0.5–0.9)
GFR AFRICAN AMERICAN: >60 ML/MIN
GFR NON-AFRICAN AMERICAN: >60 ML/MIN
GFR SERPL CREATININE-BSD FRML MDRD: ABNORMAL ML/MIN/{1.73_M2}
GFR SERPL CREATININE-BSD FRML MDRD: ABNORMAL ML/MIN/{1.73_M2}
GLUCOSE BLD-MCNC: 115 MG/DL (ref 70–99)
HDLC SERPL-MCNC: 68 MG/DL
LDL CHOLESTEROL: 110 MG/DL (ref 0–130)
PATIENT FASTING?: YES
POTASSIUM SERPL-SCNC: 3.7 MMOL/L (ref 3.7–5.3)
SODIUM BLD-SCNC: 136 MMOL/L (ref 135–144)
TOTAL PROTEIN: 8.3 G/DL (ref 6.4–8.3)
TRIGL SERPL-MCNC: 115 MG/DL
VLDLC SERPL CALC-MCNC: ABNORMAL MG/DL (ref 1–30)

## 2020-12-22 PROCEDURE — 83036 HEMOGLOBIN GLYCOSYLATED A1C: CPT

## 2020-12-22 PROCEDURE — 80061 LIPID PANEL: CPT

## 2020-12-22 PROCEDURE — 36415 COLL VENOUS BLD VENIPUNCTURE: CPT

## 2020-12-22 PROCEDURE — 80053 COMPREHEN METABOLIC PANEL: CPT

## 2020-12-22 NOTE — TELEPHONE ENCOUNTER
She has labs already ordered to check CMP,Lipid, HgbA1C so she can have it done now. Should re-evaluate when I come back from vacation or go to the ER if symptoms worsens.

## 2020-12-23 LAB
ESTIMATED AVERAGE GLUCOSE: 111 MG/DL
HBA1C MFR BLD: 5.5 % (ref 4–6)

## 2020-12-30 ENCOUNTER — HOSPITAL ENCOUNTER (OUTPATIENT)
Dept: CT IMAGING | Age: 50
Discharge: HOME | End: 2021-01-01
Payer: MEDICARE

## 2020-12-30 DIAGNOSIS — R74.8 ELEVATED ALKALINE PHOSPHATASE LEVEL: ICD-10-CM

## 2020-12-30 DIAGNOSIS — R10.11 RUQ ABDOMINAL PAIN: ICD-10-CM

## 2020-12-30 PROCEDURE — 6360000004 HC RX CONTRAST MEDICATION: Performed by: INTERNAL MEDICINE

## 2020-12-30 PROCEDURE — 74160 CT ABDOMEN W/CONTRAST: CPT

## 2020-12-30 RX ADMIN — IOPAMIDOL 75 ML: 755 INJECTION, SOLUTION INTRAVENOUS at 13:23

## 2021-01-04 DIAGNOSIS — K21.9 GASTROESOPHAGEAL REFLUX DISEASE WITHOUT ESOPHAGITIS: ICD-10-CM

## 2021-01-04 DIAGNOSIS — R10.13 EPIGASTRIC ABDOMINAL PAIN: ICD-10-CM

## 2021-01-04 DIAGNOSIS — I10 ESSENTIAL HYPERTENSION: ICD-10-CM

## 2021-01-05 RX ORDER — PANTOPRAZOLE SODIUM 40 MG/1
TABLET, DELAYED RELEASE ORAL
Qty: 60 TABLET | Refills: 2 | Status: SHIPPED | OUTPATIENT
Start: 2021-01-05 | End: 2021-04-08

## 2021-01-05 RX ORDER — POTASSIUM CHLORIDE 20 MEQ/1
TABLET, EXTENDED RELEASE ORAL
Qty: 30 TABLET | Refills: 2 | Status: SHIPPED | OUTPATIENT
Start: 2021-01-05 | End: 2021-04-08

## 2021-01-05 NOTE — TELEPHONE ENCOUNTER
Health Maintenance   Topic Date Due    Hepatitis C screen  1970    Cervical cancer screen  05/04/2020    Shingles Vaccine (1 of 2) 08/19/2020    Flu vaccine (1) 09/01/2020    Breast cancer screen  03/13/2021    Annual Wellness Visit (AWV)  10/21/2021    Lipid screen  12/22/2021    Potassium monitoring  12/22/2021    Creatinine monitoring  12/22/2021    Colon cancer screen fecal DNA test (Cologuard)  11/04/2023    Diabetes screen  12/22/2023    DTaP/Tdap/Td vaccine (2 - Td) 05/28/2025    HIV screen  Completed    Hepatitis A vaccine  Aged Out    Hepatitis B vaccine  Aged Out    Hib vaccine  Aged Out    Meningococcal (ACWY) vaccine  Aged Out    Pneumococcal 0-64 years Vaccine  Aged Out             (applicable per patient's age: Cancer Screenings, Depression Screening, Fall Risk Screening, Immunizations)    Hemoglobin A1C (%)   Date Value   12/22/2020 5.5   02/03/2020 5.4     LDL Cholesterol (mg/dL)   Date Value   12/22/2020 110     AST (U/L)   Date Value   12/22/2020 28     ALT (U/L)   Date Value   12/22/2020 18     BUN (mg/dL)   Date Value   12/22/2020 9      (goal A1C is < 7)   (goal LDL is <100) need 30-50% reduction from baseline     BP Readings from Last 3 Encounters:   12/11/20 (!) 144/88   10/20/20 124/72   08/04/20 132/82    (goal /80)      All Future Testing planned in [facility]:  Lab Frequency Next Occurrence       Next Visit Date:  Future Appointments   Date Time Provider Sheldon Berry   2/9/2021  1:45 PM Nicolas Chau MD [facility]   10/21/2021  1:00 PM [facility] [address] [facility]            Patient Active Problem List:     Mixed hypercholesterolemia and hypertriglyceridemia     Anxiety     Depression     Urinary urgency     Hiatal hernia     Symptomatic cholelithiasis     Renal colic on right side     Gastroesophageal reflux disease without esophagitis     Urethral stenosis     Major depressive disorder with single episode, in full remission ([facility] [address]     Class 3 severe obesity without serious comorbidity in adult [facility])     Hyperglycemia

## 2021-02-05 DIAGNOSIS — I10 ESSENTIAL HYPERTENSION: ICD-10-CM

## 2021-02-05 DIAGNOSIS — R60.0 BILATERAL EDEMA OF LOWER EXTREMITY: ICD-10-CM

## 2021-02-08 RX ORDER — HYDROCHLOROTHIAZIDE 25 MG/1
TABLET ORAL
Qty: 30 TABLET | Refills: 5 | Status: SHIPPED | OUTPATIENT
Start: 2021-02-08 | Stop reason: SDUPTHER

## 2021-02-08 NOTE — TELEPHONE ENCOUNTER
Health Maintenance   Topic Date Due    Hepatitis C screen  1970    Cervical cancer screen  05/04/2020    Shingles Vaccine (1 of 2) 08/19/2020    Flu vaccine (1) 09/01/2020    Breast cancer screen  03/13/2021    Annual Wellness Visit (AWV)  10/21/2021    Lipid screen  12/22/2021    Potassium monitoring  12/22/2021    Creatinine monitoring  12/22/2021    Colon cancer screen fecal DNA test (Cologuard)  11/04/2023    Diabetes screen  12/22/2023    DTaP/Tdap/Td vaccine (2 - Td) 05/28/2025    HIV screen  Completed    Hepatitis A vaccine  Aged Out    Hepatitis B vaccine  Aged Out    Hib vaccine  Aged Out    Meningococcal (ACWY) vaccine  Aged Out    Pneumococcal 0-64 years Vaccine  Aged Out             (applicable per patient's age: Cancer Screenings, Depression Screening, Fall Risk Screening, Immunizations)    Hemoglobin A1C (%)   Date Value   12/22/2020 5.5   02/03/2020 5.4     LDL Cholesterol (mg/dL)   Date Value   12/22/2020 110     AST (U/L)   Date Value   12/22/2020 28     ALT (U/L)   Date Value   12/22/2020 18     BUN (mg/dL)   Date Value   12/22/2020 9      (goal A1C is < 7)   (goal LDL is <100) need 30-50% reduction from baseline     BP Readings from Last 3 Encounters:   12/11/20 (!) 144/88   10/20/20 124/72   08/04/20 132/82    (goal /80)      All Future Testing planned in [facility]:  Lab Frequency Next Occurrence   MARCIN DIGITAL SCREEN W OR WO CAD BILATERAL Once 01/22/2021       Next Visit Date:  Future Appointments   Date Time Provider Sheldon Berry   2/9/2021  1:45 PM MD Kim Bautista  [facility]   10/21/2021  1:00 PM Durga Smith MD [facility] [place]            Patient Active Problem List:     Mixed hypercholesterolemia and hypertriglyceridemia     Anxiety     Depression     Urinary urgency     Hiatal hernia     Symptomatic cholelithiasis     Renal colic on right side     Gastroesophageal reflux disease without esophagitis     Urethral stenosis     Major depressive disorder with single episode, in full remission ([facility] [address]     Class 3 severe obesity without serious comorbidity in adult [facility])     Hyperglycemia

## 2021-03-02 ENCOUNTER — OFFICE VISIT (OUTPATIENT)
Dept: FAMILY MEDICINE CLINIC | Age: 51
End: 2021-03-02
Payer: MEDICARE

## 2021-03-02 VITALS
SYSTOLIC BLOOD PRESSURE: 130 MMHG | HEIGHT: 62 IN | HEART RATE: 78 BPM | WEIGHT: 264 LBS | BODY MASS INDEX: 48.58 KG/M2 | DIASTOLIC BLOOD PRESSURE: 82 MMHG

## 2021-03-02 DIAGNOSIS — E66.01 CLASS 3 SEVERE OBESITY DUE TO EXCESS CALORIES WITHOUT SERIOUS COMORBIDITY WITH BODY MASS INDEX (BMI) OF 45.0 TO 49.9 IN ADULT (HCC): ICD-10-CM

## 2021-03-02 DIAGNOSIS — M46.1 SACROILIITIS (HCC): ICD-10-CM

## 2021-03-02 DIAGNOSIS — M54.42 CHRONIC MIDLINE LOW BACK PAIN WITH LEFT-SIDED SCIATICA: ICD-10-CM

## 2021-03-02 DIAGNOSIS — R73.9 HYPERGLYCEMIA: ICD-10-CM

## 2021-03-02 DIAGNOSIS — K21.9 GASTROESOPHAGEAL REFLUX DISEASE WITHOUT ESOPHAGITIS: ICD-10-CM

## 2021-03-02 DIAGNOSIS — F32.5 MAJOR DEPRESSIVE DISORDER WITH SINGLE EPISODE, IN FULL REMISSION (HCC): ICD-10-CM

## 2021-03-02 DIAGNOSIS — G89.29 CHRONIC MIDLINE LOW BACK PAIN WITH LEFT-SIDED SCIATICA: ICD-10-CM

## 2021-03-02 DIAGNOSIS — I10 ESSENTIAL HYPERTENSION: Primary | ICD-10-CM

## 2021-03-02 DIAGNOSIS — E78.2 MIXED HYPERCHOLESTEROLEMIA AND HYPERTRIGLYCERIDEMIA: ICD-10-CM

## 2021-03-02 PROCEDURE — 1036F TOBACCO NON-USER: CPT | Performed by: INTERNAL MEDICINE

## 2021-03-02 PROCEDURE — G8484 FLU IMMUNIZE NO ADMIN: HCPCS | Performed by: INTERNAL MEDICINE

## 2021-03-02 PROCEDURE — 99214 OFFICE O/P EST MOD 30 MIN: CPT | Performed by: INTERNAL MEDICINE

## 2021-03-02 PROCEDURE — 3017F COLORECTAL CA SCREEN DOC REV: CPT | Performed by: INTERNAL MEDICINE

## 2021-03-02 PROCEDURE — G8417 CALC BMI ABV UP PARAM F/U: HCPCS | Performed by: INTERNAL MEDICINE

## 2021-03-02 PROCEDURE — G8427 DOCREV CUR MEDS BY ELIG CLIN: HCPCS | Performed by: INTERNAL MEDICINE

## 2021-03-02 ASSESSMENT — ENCOUNTER SYMPTOMS
ABDOMINAL PAIN: 0
BLOOD IN STOOL: 0
NAUSEA: 0
RHINORRHEA: 0
BACK PAIN: 1
COUGH: 0
SHORTNESS OF BREATH: 0
CONSTIPATION: 0
SORE THROAT: 0
CHEST TIGHTNESS: 0
DIARRHEA: 0

## 2021-03-02 ASSESSMENT — PATIENT HEALTH QUESTIONNAIRE - PHQ9
SUM OF ALL RESPONSES TO PHQ9 QUESTIONS 1 & 2: 2
1. LITTLE INTEREST OR PLEASURE IN DOING THINGS: 1

## 2021-03-02 NOTE — PROGRESS NOTES
Joanie Bridges (:  1970) is a 48 y.o. female,Established patient, here for evaluation of the following chief complaint(s):  Hypertension, Hyperlipidemia, Gastroesophageal Reflux, Mental Health Problem (follows w/ Dr Tano Waddell), and Foot Pain (BL foot pain)      ASSESSMENT/PLAN:  1. Essential hypertension  -     Comprehensive Metabolic Panel; Future  2. [facility] ([facility]  3. Major depressive disorder with single episode, in full remission ([facility]  -     TSH with Reflex; Future  4. Class 3 severe obesity due to excess calories without serious comorbidity with body mass index (BMI) of 45.0 to 49.9 in adult ([facility]  5. Mixed hypercholesterolemia and hypertriglyceridemia  -     Comprehensive Metabolic Panel; Future  -     Lipid Panel; Future  6. Gastroesophageal reflux disease without esophagitis  7. Hyperglycemia  -     Comprehensive Metabolic Panel; Future  8. Chronic midline low back pain with left-sided sciatica  -     [facility] [place]      Plan:  1. Sacroiliitis [facility])  Patient is looking for Chiropractic treatment. Jurgen Jefferson will be referred to Physical Therapy at [facility]. Please call to schedule an appointment  at [phone].    2. Major depressive disorder with single episode, in full remission ([facility]  Stable on the current medication. Following with Psychiatry. 3. Class 3 severe obesity due to excess calories without serious comorbidity with body mass index (BMI) of 45.0 to 49.9 in adult [facility])  Continue to work on wt loss / exercise. 4. Mixed hypercholesterolemia and hypertriglyceridemia  Controlled on Lipitor. Obtain labs approximately one week prior to the office appointment. Please fast for 12 hours prior to obtaining the labs. Water or black coffee (no cream or sugar) is allowed prior to the the labs. 5. Gastroesophageal reflux disease without esophagitis  Controlled on Protonix. 6. Hyperglycemia  HgbA1C was 5.5. 7. Essential hypertension  Controlled on HCTZ. Faiza Norman was instructed to follow up in the clinic in 6 months for check up or as needed with any medical issues. SUBJECTIVE/OBJECTIVE:  Faiza Norman presents for a check up on her medical conditions HTN, Hyperlipidemia, GERD, back pain. Faiza Norman admits to new problems. Medications were reviewed with Faiza Norman, she is  tolerating the medication. Bowels are regular. There has not been rectal bleeding. Faiza Norman denies urinary complications, the urine stream is good. Faiza Norman denies chest pain and denies increasing shortness of breath. Right leg pain improved after taking Magnesium. She continues to have generalized back pain and looking for a Chiropractor for adjustment. Xray in , reviewed of the lumbar spine. Discussed interventional pain mgt, does not want to pursue at this time. Gina Reyes continues to follow with Psychiatry for depression / anxiety. Gina Reyes had an injection by Dr. Radha Marin in Podiatry in the right foot and has some numbness after after. She did not follow with him. Gina Reyes is changing to Dr. Radha Marin. Past Medical History:  No date:  Anxiety  No date: Asthma  5/15/2018: Class 3 obesity due to excess calories without serious   comorbidity with body mass index (BMI) of 45.0 to 49.9 in adult  No date: Cro[facility]'s disease ([facility]  No date: Depression  No date: GERD (gastroesophageal reflux disease)  No date: Hyperlipidemia  No date: Hypertension  No date: Wears glasses    Past Surgical History:  No date:  SECTION  9/25/15: CHOLECYSTECTOMY, LAPAROSCOPIC  2015: CYSTOSCOPY  No date: MOUTH SURGERY    Social History    Socioeconomic History      Marital status:       Spouse name: Not on file      Number of children: Not on file      Years of education: Not on file      Highest education level: Not on file    Occupational History        Employer: UNEMPLOYED    Social Needs      Financial resource strain: Somewhat hard Food insecurity        Worry: Sometimes true        Inability: Sometimes true      Transportation needs        Medical: No        Non-medical: No    Tobacco Use      Smoking status: Never Smoker      Smokeless tobacco: Never Used    Substance and Sexual Activity      Alcohol use: No        Comment: occassional      Drug use: No      Sexual activity: Yes        Partners: Male    Lifestyle      Physical activity        Days per week: Not on file        Minutes per session: Not on file      Stress: Not on file    Relationships      Social connections        Talks on phone: Not on file        Gets together: Not on file        Attends Quaker service: Not on file        Active member of club or organization: Not on file        Attends meetings of clubs or organizations: Not on file        Relationship status: Not on file      Intimate partner violence        Fear of current or ex partner: Not on file        Emotionally abused: Not on file        Physically abused: Not on file        Forced sexual activity: Not on file    Other Topics      Concerns:        Not on file    Social History Narrative      Not on file      Review of patient's family history indicates:  Problem: Diabetes      Relation: Mother          Age of Onset: (Not Specified)  Problem: Cancer      Relation: Mother          Age of Onset: (Not Specified)  Problem: Depression      Relation: Father          Age of Onset: (Not Specified)  Problem: High Blood Pressure      Relation: Father          Age of Onset: (Not Specified)      Current Outpatient Medications on File Prior to Visit:    hydroCHLOROthiazide (HYDRODIURIL) 25 MG tablet, TAKE 1 TABLET BY MOUTH EVERY DAY, Disp: 30 tablet, Rfl: 5    potassium chloride (KLOR-CON M) 20 MEQ extended release tablet, TAKE 1 TABLET BY MOUTH EVERY DAY, Disp: 30 tablet, Rfl: 2    pantoprazole (PROTONIX) 40 MG tablet, TAKE 1 TABLET BY MOUTH TWICE DAILY, Disp: 60 tablet, Rfl: 2   atorvastatin (LIPITOR) 40 MG tablet, TAKE 1 TABLET BY MOUTH EVERY DAY, Disp: 30 tablet, Rfl: 5    sertraline (ZOLOFT) 100 MG tablet, TAKE 2 TABLETS BY MOUTH EVERY DAY, Disp: 30 tablet, Rfl: 5    ALPRAZolam (XANAX) 0.5 MG tablet, TAKE 1 TABLET BY MOUTH TWICE DAILY AS NEEDED, Disp: , Rfl: 2    norethindrone-ethinyl estradiol (ORTHO-NOVUM 7/7/7, 28,) 0.5/0.75/1-35 MG-MCG per tablet, Take 1 tablet by mouth daily. , Disp: 1 packet, Rfl: 3    albuterol sulfate  (90 Base) MCG/ACT inhaler, Inhale 2 puffs into the lungs every 4 hours as needed for Wheezing or Shortness of Breath (Space out to every 6 hours as symptoms improve), Disp: 1 Inhaler, Rfl: 0    ondansetron (ZOFRAN) 4 MG tablet, TAKE 1 TABLET BY MOUTH EVERY 8 HOURS AS NEEDED FOR NAUSEA / vomiting, Disp: 40 tablet, Rfl: 1    Blood Pressure Monitor KIT, Monitor BP daily. , Disp: 1 kit, Rfl: 0    No current facility-administered medications on file prior to visit.         -- Medrol (Methylprednisolone) -- Itching   -- Other -- Swelling    --  EGG PLANT   -- Pcn (Penicillins) -- Rash      Lab Results       Component                Value               Date                       NA                       136                 12/22/2020                 K                        3.7                 12/22/2020                 CL                       99                  12/22/2020                 CO2                      27                  12/22/2020                 BUN                      9                   12/22/2020                 CREATININE               0.69                12/22/2020                 GLUCOSE                  115 (H)             12/22/2020                 CALCIUM                  8.9                 12/22/2020                 PROT                     8.3                 12/22/2020                 LABALBU                  3.9                 12/22/2020                 BILITOT                  0.45                12/22/2020 ALKPHOS                  137 (H)             12/22/2020                 AST                      28                  12/22/2020                 ALT                      18                  12/22/2020                 LABGLOM                  >60                 12/22/2020                 GFRAA                    >60                 12/22/2020              Lab Results       Component                Value               Date                       LABA1C                   5.5                 12/22/2020            Lab Results       Component                Value               Date                       EAG                      111                 12/22/2020              Lab Results       Component                Value               Date                       CHOL                     201 (H)             12/22/2020                 CHOL                     214 (H)             02/03/2020                 CHOL                     202 (H)             07/30/2019            Lab Results       Component                Value               Date                       TRIG                     115                 12/22/2020                 TRIG                     148                 02/03/2020                 TRIG                     123                 07/30/2019            Lab Results       Component                Value               Date                       HDL                      68                  12/22/2020                 HDL                      68                  02/03/2020                 HDL                      64                  07/30/2019            Lab Results       Component                Value               Date                       LDLCHOLESTEROL           110                 12/22/2020                 LDLCHOLESTEROL           116                 02/03/2020                 LDLCHOLESTEROL           113                 07/30/2019            Lab Results Component                Value               Date                       VLDL                     NOT REPORTED        12/22/2020                 VLDL                     NOT REPORTED        02/03/2020                 VLDL                     NOT REPORTED        07/30/2019            Lab Results       Component                Value               Date                       CHOLHDLRATIO             3.0                 12/22/2020                 CHOLHDLRATIO             3.1                 02/03/2020                 CHOLHDLRATIO             3.2                 07/30/2019                              Hypertension  This is a chronic problem. The current episode started more than 1 year ago. The problem is unchanged. The problem is controlled. Pertinent negatives include no chest pain, neck pain, palpitations or shortness of breath. Past treatments include diuretics. The current treatment provides significant improvement. There are no compliance problems. There is no history of angina. Hyperlipidemia  This is a chronic problem. The current episode started more than 1 year ago. The problem is controlled. Recent lipid tests were reviewed and are normal. Pertinent negatives include no chest pain, myalgias or shortness of breath. Current antihyperlipidemic treatment includes statins. The current treatment provides significant improvement of lipids. Gastroesophageal Reflux  She reports no abdominal pain, no chest pain, no coughing, no nausea or no sore throat. This is a chronic problem. The current episode started more than 1 year ago. The problem occurs rarely. The problem has been unchanged. She has tried a PPI for the symptoms. The treatment provided significant relief. Foot Pain         Review of Systems   Constitutional: Negative. HENT: Negative for congestion, ear pain, rhinorrhea, sneezing and sore throat. Eyes: Negative for visual disturbance. Respiratory: Negative for cough, chest tightness and shortness of breath. Cardiovascular: Negative for chest pain and palpitations. Gastrointestinal: Negative for abdominal pain, blood in stool, constipation, diarrhea and nausea. Genitourinary: Negative for difficulty urinating, dysuria, frequency, menstrual problem and urgency. Musculoskeletal: Positive for arthralgias and back pain. Negative for joint swelling, myalgias and neck pain. Skin: Negative. Neurological: Negative for syncope. Psychiatric/Behavioral: Negative. Physical Exam  Constitutional:       Appearance: She is well-developed. She is obese. HENT:      Head: Atraumatic. Eyes:      Conjunctiva/sclera: Conjunctivae normal.   Neck:      Musculoskeletal: Normal range of motion and neck supple. Cardiovascular:      Rate and Rhythm: Normal rate and regular rhythm. Heart sounds: Normal heart sounds. Pulmonary:      Effort: Pulmonary effort is normal.      Breath sounds: Normal breath sounds. Abdominal:      Palpations: Abdomen is soft. Tenderness: There is no abdominal tenderness. Musculoskeletal: Normal range of motion. Lymphadenopathy:      Cervical: No cervical adenopathy. Skin:     Findings: No rash. Neurological:      Mental Status: She is alert. Psychiatric:         Behavior: Behavior normal.         Thought Content: Thought content normal.                 An electronic signature was used to authenticate this note.     --Katherin Reina MD

## 2021-03-02 NOTE — PATIENT INSTRUCTIONS
Survey: You may be receiving a survey from [employer] regarding your visit today. You may get this in the mail, through your MyChart or in your email. Please complete the survey to enable us to provide the highest quality of care to you and your family. Please also, mention our names. If you cannot score us as very good (5 Stars) on any question, please feel free to call the office to discuss how we could have made your experience exceptional.      Thank You! MD Albino Ravi Mai, JOSE Chávez, [facility], BSN RN    Raj Rueda, [address] [place] [address]      1. Sacroiliitis [facility])  Patient is looking for Chiropractic treatment. 2. Major depressive disorder with single episode, in full remission ([address]  Stable on the current medication. Following with Psychiatry. 3. Class 3 severe obesity due to excess calories without serious comorbidity with body mass index (BMI) of 45.0 to 49.9 in adult [facility])  Continue to work on wt loss / exercise. 4. Mixed hypercholesterolemia and hypertriglyceridemia  Controlled on Lipitor. Obtain labs approximately one week prior to the office appointment. Please fast for 12 hours prior to obtaining the labs. Water or black coffee (no cream or sugar) is allowed prior to the the labs. 5. Gastroesophageal reflux disease without esophagitis  Controlled on Protonix. 6. Hyperglycemia  HgbA1C was 5.5.    7. Essential hypertension  Controlled on HCTZ. Danni Cordova was instructed to follow up in the clinic in 6 months for check up or as needed with any medical issues.

## 2021-04-07 DIAGNOSIS — I10 ESSENTIAL HYPERTENSION: ICD-10-CM

## 2021-04-07 DIAGNOSIS — R10.13 EPIGASTRIC ABDOMINAL PAIN: ICD-10-CM

## 2021-04-07 DIAGNOSIS — K21.9 GASTROESOPHAGEAL REFLUX DISEASE WITHOUT ESOPHAGITIS: ICD-10-CM

## 2021-04-08 ENCOUNTER — HOSPITAL ENCOUNTER (OUTPATIENT)
Dept: MAMMOGRAPHY | Age: 51
Discharge: HOME | End: 2021-04-10
Payer: MEDICARE

## 2021-04-08 DIAGNOSIS — Z12.31 BREAST CANCER SCREENING BY MAMMOGRAM: ICD-10-CM

## 2021-04-08 PROCEDURE — 77063 BREAST TOMOSYNTHESIS BI: CPT

## 2021-04-08 RX ORDER — PANTOPRAZOLE SODIUM 40 MG/1
TABLET, DELAYED RELEASE ORAL
Qty: 60 TABLET | Refills: 5 | Status: SHIPPED | OUTPATIENT
Start: 2021-04-08 | End: 2021-10-07

## 2021-04-08 RX ORDER — POTASSIUM CHLORIDE 20 MEQ/1
TABLET, EXTENDED RELEASE ORAL
Qty: 30 TABLET | Refills: 5 | Status: SHIPPED | OUTPATIENT
Start: 2021-04-08 | End: 2021-10-07

## 2021-04-08 NOTE — TELEPHONE ENCOUNTER
Health Maintenance   Topic Date Due    Hepatitis C screen  Never done    COVID-19 Vaccine (1) Never done    Cervical cancer screen  05/04/2020    Shingles Vaccine (1 of 2) Never done    Breast cancer screen  03/13/2021    Flu vaccine (Season Ended) 09/01/2021    Annual Wellness Visit (AWV)  10/21/2021    Lipid screen  12/22/2021    Potassium monitoring  12/22/2021    Creatinine monitoring  12/22/2021    Colon cancer screen fecal DNA test (Cologuard)  11/04/2023    Diabetes screen  12/22/2023    DTaP/Tdap/Td vaccine (2 - Td) 05/28/2025    HIV screen  Completed    Hepatitis A vaccine  Aged Out    Hepatitis B vaccine  Aged Out    Hib vaccine  Aged Out    Meningococcal (ACWY) vaccine  Aged Out    Pneumococcal 0-64 years Vaccine  Aged Out             (applicable per patient's age: Cancer Screenings, Depression Screening, Fall Risk Screening, Immunizations)    Hemoglobin A1C (%)   Date Value   12/22/2020 5.5   02/03/2020 5.4     LDL Cholesterol (mg/dL)   Date Value   12/22/2020 110     AST (U/L)   Date Value   12/22/2020 28     ALT (U/L)   Date Value   12/22/2020 18     BUN (mg/dL)   Date Value   12/22/2020 9      (goal A1C is < 7)   (goal LDL is <100) need 30-50% reduction from baseline     BP Readings from Last 3 Encounters:   03/02/21 130/82   12/11/20 (!) 144/88   10/20/20 124/72    (goal /80)      All Future Testing planned in [facility]:  Lab Frequency Next Occurrence   MARCIN DIGITAL SCREEN W OR WO CAD BILATERAL Once 01/22/2021   Comprehensive Metabolic Panel Once 49/68/7393   Lipid Panel Once 09/02/2021   TSH with Reflex Once 09/02/2021       Next Visit Date:  Future Appointments   Date Time Provider [facility]    4/8/2021  1:30 PM [address] [facility]   9/14/2021  2:30 PM Peg Marshall MD [id number]   10/21/2021  1:00 PM MD [facility]            Patient Active Problem List:     Mixed hypercholesterolemia and hypertriglyceridemia Anxiety     Depression     Urinary urgency     Hiatal hernia     Symptomatic cholelithiasis     Renal colic on right side     Gastroesophageal reflux disease without esophagitis     Urethral stenosis     Major depressive disorder with single episode, in full remission [facility]     Class 3 severe obesity without serious comorbidity in [facility])     Hyperglycemia     Sacroiliitis ([facility]

## 2021-06-08 DIAGNOSIS — E78.2 MIXED HYPERCHOLESTEROLEMIA AND HYPERTRIGLYCERIDEMIA: ICD-10-CM

## 2021-06-08 RX ORDER — ATORVASTATIN CALCIUM 40 MG/1
TABLET, FILM COATED ORAL
Qty: 30 TABLET | Refills: 5 | Status: SHIPPED | OUTPATIENT
Start: 2021-06-08 | End: 2021-12-08

## 2021-06-08 NOTE — TELEPHONE ENCOUNTER
side     Gastroesophageal reflux disease without esophagitis     Urethral stenosis     Major depressive disorder with single episode, in full remission ([facility]     Class 3 severe obesity without serious comorbidity in adult [facility])     Hyperglycemia     Sacroiliitis ([facility]

## 2021-08-03 DIAGNOSIS — R60.0 BILATERAL EDEMA OF LOWER EXTREMITY: ICD-10-CM

## 2021-08-03 DIAGNOSIS — I10 ESSENTIAL HYPERTENSION: ICD-10-CM

## 2021-08-04 RX ORDER — HYDROCHLOROTHIAZIDE 25 MG/1
TABLET ORAL
Qty: 30 TABLET | Refills: 5 | Status: SHIPPED | OUTPATIENT
Start: 2021-08-04 | End: 2022-01-31

## 2021-08-04 NOTE — TELEPHONE ENCOUNTER
Health Maintenance   Topic Date Due    Hepatitis C screen  Never done    COVID-19 Vaccine (1) Never done    Cervical cancer screen  05/04/2020    Shingles Vaccine (1 of 2) Never done    Flu vaccine (1) 09/01/2021    Annual Wellness Visit (AWV)  10/21/2021    Lipid screen  12/22/2021    Potassium monitoring  12/22/2021    Creatinine monitoring  12/22/2021    Breast cancer screen  04/08/2023    Colon cancer screen fecal DNA test (Cologuard)  11/04/2023    Diabetes screen  12/22/2023    DTaP/Tdap/Td vaccine (2 - Td or Tdap) 05/28/2025    HIV screen  Completed    Hepatitis A vaccine  Aged Out    Hepatitis B vaccine  Aged Out    Hib vaccine  Aged Out    Meningococcal (ACWY) vaccine  Aged Out    Pneumococcal 0-64 years Vaccine  Aged Out             (applicable per patient's age: Cancer Screenings, Depression Screening, Fall Risk Screening, Immunizations)    Hemoglobin A1C (%)   Date Value   12/22/2020 5.5   02/03/2020 5.4     LDL Cholesterol (mg/dL)   Date Value   12/22/2020 110     AST (U/L)   Date Value   12/22/2020 28     ALT (U/L)   Date Value   12/22/2020 18     BUN (mg/dL)   Date Value   12/22/2020 9      (goal A1C is < 7)   (goal LDL is <100) need 30-50% reduction from baseline     BP Readings from Last 3 Encounters:   03/02/21 130/82   12/11/20 (!) 144/88   10/20/20 124/72    (goal /80)      All Future Testing planned in [facility]:  Lab Frequency Next Occurrence   Comprehensive Metabolic Panel Once 75/97/7373   Lipid Panel Once 09/02/2021   TSH with Reflex Once 09/02/2021       Next Visit Date:  Future Appointments   Date Time Provider Department Center   9/14/2021  2:30 PM MD [facility]   10/21/2021  1:00 PM MD Jun [facility]            Patient Active Problem List:     Mixed hypercholesterolemia and hypertriglyceridemia     Anxiety     Depression     Urinary urgency     Hiatal hernia     Symptomatic cholelithiasis     Renal colic on right side Gastroesophageal reflux disease without esophagitis     Urethral stenosis     Major depressive disorder with single episode, in full remission ([facility]     Class 3 severe obesity without serious comorbidity in adult [facility])     Hyperglycemia     Sacroiliitis ([facility]

## 2021-09-10 ENCOUNTER — HOSPITAL ENCOUNTER (OUTPATIENT)
Age: 51
Discharge: HOME | End: 2021-09-10
Payer: MEDICARE

## 2021-09-10 DIAGNOSIS — E78.2 MIXED HYPERCHOLESTEROLEMIA AND HYPERTRIGLYCERIDEMIA: ICD-10-CM

## 2021-09-10 DIAGNOSIS — R73.9 HYPERGLYCEMIA: ICD-10-CM

## 2021-09-10 DIAGNOSIS — I10 ESSENTIAL HYPERTENSION: ICD-10-CM

## 2021-09-10 DIAGNOSIS — F32.5 MAJOR DEPRESSIVE DISORDER WITH SINGLE EPISODE, IN FULL REMISSION (HCC): ICD-10-CM

## 2021-09-10 LAB
ALBUMIN SERPL-MCNC: 3.7 G/DL (ref 3.5–5.2)
ALBUMIN/GLOBULIN RATIO: ABNORMAL (ref 1–2.5)
ALP BLD-CCNC: 106 U/L (ref 35–104)
ALT SERPL-CCNC: 13 U/L (ref 5–33)
ANION GAP SERPL CALCULATED.3IONS-SCNC: 11 MMOL/L (ref 9–17)
AST SERPL-CCNC: 18 U/L
BILIRUB SERPL-MCNC: 0.34 MG/DL (ref 0.3–1.2)
BUN BLDV-MCNC: 8 MG/DL (ref 6–20)
BUN/CREAT BLD: 13 (ref 9–20)
CALCIUM SERPL-MCNC: 9.3 MG/DL (ref 8.6–10.4)
CHLORIDE BLD-SCNC: 102 MMOL/L (ref 98–107)
CHOLESTEROL/HDL RATIO: 3.1
CHOLESTEROL: 194 MG/DL
CO2: 25 MMOL/L (ref 20–31)
CREAT SERPL-MCNC: 0.6 MG/DL (ref 0.5–0.9)
GFR AFRICAN AMERICAN: >60 ML/MIN
GFR NON-AFRICAN AMERICAN: >60 ML/MIN
GFR SERPL CREATININE-BSD FRML MDRD: ABNORMAL ML/MIN/{1.73_M2}
GFR SERPL CREATININE-BSD FRML MDRD: ABNORMAL ML/MIN/{1.73_M2}
GLUCOSE BLD-MCNC: 99 MG/DL (ref 70–99)
HDLC SERPL-MCNC: 63 MG/DL
LDL CHOLESTEROL: 107 MG/DL (ref 0–130)
PATIENT FASTING?: YES
POTASSIUM SERPL-SCNC: 4.1 MMOL/L (ref 3.7–5.3)
SODIUM BLD-SCNC: 138 MMOL/L (ref 135–144)
TOTAL PROTEIN: 7.4 G/DL (ref 6.4–8.3)
TRIGL SERPL-MCNC: 121 MG/DL
TSH SERPL DL<=0.05 MIU/L-ACNC: 3.45 MIU/L (ref 0.3–5)
VLDLC SERPL CALC-MCNC: NORMAL MG/DL (ref 1–30)

## 2021-09-10 PROCEDURE — 36415 COLL VENOUS BLD VENIPUNCTURE: CPT

## 2021-09-10 PROCEDURE — 80061 LIPID PANEL: CPT

## 2021-09-10 PROCEDURE — 84443 ASSAY THYROID STIM HORMONE: CPT

## 2021-09-10 PROCEDURE — 80053 COMPREHEN METABOLIC PANEL: CPT

## 2021-09-14 ENCOUNTER — OFFICE VISIT (OUTPATIENT)
Dept: FAMILY MEDICINE CLINIC | Age: 51
End: 2021-09-14
Payer: MEDICARE

## 2021-09-14 VITALS
DIASTOLIC BLOOD PRESSURE: 74 MMHG | HEART RATE: 82 BPM | WEIGHT: 259 LBS | SYSTOLIC BLOOD PRESSURE: 139 MMHG | HEIGHT: 62 IN | BODY MASS INDEX: 47.66 KG/M2

## 2021-09-14 DIAGNOSIS — K21.9 GASTROESOPHAGEAL REFLUX DISEASE WITHOUT ESOPHAGITIS: ICD-10-CM

## 2021-09-14 DIAGNOSIS — E66.01 CLASS 3 SEVERE OBESITY DUE TO EXCESS CALORIES WITHOUT SERIOUS COMORBIDITY WITH BODY MASS INDEX (BMI) OF 45.0 TO 49.9 IN ADULT (HCC): ICD-10-CM

## 2021-09-14 DIAGNOSIS — F32.5 MAJOR DEPRESSIVE DISORDER WITH SINGLE EPISODE, IN FULL REMISSION (HCC): ICD-10-CM

## 2021-09-14 DIAGNOSIS — R73.9 HYPERGLYCEMIA: ICD-10-CM

## 2021-09-14 DIAGNOSIS — I10 ESSENTIAL HYPERTENSION: ICD-10-CM

## 2021-09-14 DIAGNOSIS — E78.2 MIXED HYPERCHOLESTEROLEMIA AND HYPERTRIGLYCERIDEMIA: Primary | ICD-10-CM

## 2021-09-14 PROBLEM — M46.1 SACROILIITIS (HCC): Status: RESOLVED | Noted: 2021-03-02 | Resolved: 2021-09-14

## 2021-09-14 PROCEDURE — G8417 CALC BMI ABV UP PARAM F/U: HCPCS | Performed by: INTERNAL MEDICINE

## 2021-09-14 PROCEDURE — 1036F TOBACCO NON-USER: CPT | Performed by: INTERNAL MEDICINE

## 2021-09-14 PROCEDURE — 99214 OFFICE O/P EST MOD 30 MIN: CPT | Performed by: INTERNAL MEDICINE

## 2021-09-14 PROCEDURE — 3017F COLORECTAL CA SCREEN DOC REV: CPT | Performed by: INTERNAL MEDICINE

## 2021-09-14 PROCEDURE — G8427 DOCREV CUR MEDS BY ELIG CLIN: HCPCS | Performed by: INTERNAL MEDICINE

## 2021-09-14 SDOH — ECONOMIC STABILITY: FOOD INSECURITY: WITHIN THE PAST 12 MONTHS, THE FOOD YOU BOUGHT JUST DIDN'T LAST AND YOU DIDN'T HAVE MONEY TO GET MORE.: NEVER TRUE

## 2021-09-14 SDOH — ECONOMIC STABILITY: FOOD INSECURITY: WITHIN THE PAST 12 MONTHS, YOU WORRIED THAT YOUR FOOD WOULD RUN OUT BEFORE YOU GOT MONEY TO BUY MORE.: NEVER TRUE

## 2021-09-14 ASSESSMENT — ENCOUNTER SYMPTOMS
BLOOD IN STOOL: 0
SORE THROAT: 0
DIARRHEA: 0
COUGH: 0
CHEST TIGHTNESS: 0
ABDOMINAL PAIN: 0
NAUSEA: 0
SHORTNESS OF BREATH: 0
CONSTIPATION: 0
RHINORRHEA: 0

## 2021-09-14 ASSESSMENT — SOCIAL DETERMINANTS OF HEALTH (SDOH): HOW HARD IS IT FOR YOU TO PAY FOR THE VERY BASICS LIKE FOOD, HOUSING, MEDICAL CARE, AND HEATING?: NOT VERY HARD

## 2021-09-14 NOTE — PROGRESS NOTES
Cady Paez (:  1970) is a 46 y.o. female,Established patient, here for evaluation of the following chief complaint(s):  Discuss Labs, Hypertension, Gastroesophageal Reflux, Mental Health Problem (follows with Dr Sheree Espino), and Hyperlipidemia         ASSESSMENT/PLAN:  1. Mixed hypercholesterolemia and hypertriglyceridemia  -     Comprehensive Metabolic Panel; Future  -     Lipid Panel; Future  2. Major depressive disorder with single episode, in full remission ([facility].)  3. Hyperglycemia  -     Comprehensive Metabolic Panel; Future  -     Hemoglobin A1C; Future  4. Class 3 severe obesity due to excess calories without serious comorbidity with body mass index (BMI) of 45.0 to 49.9 in adult ([facility]  5. Gastroesophageal reflux disease without esophagitis  6. Essential hypertension      Plan:  1. Mixed hypercholesterolemia and hypertriglyceridemia  Controlled on Lipitor.  - Comprehensive Metabolic Panel; Future  - Lipid Panel; Future    2. Major depressive disorder with single episode, in full remission [facility])  Follows with Psychiatry. 3. Hyperglycemia  HgbA1C in 6 months. - Comprehensive Metabolic Panel; Future  - Hemoglobin A1C; Future    4. Class 3 severe obesity due to excess calories without serious comorbidity with body mass index (BMI) of 45.0 to 49.9 in adult [facility])  Continue to work on wt loss and exercise. 5. Gastroesophageal reflux disease without esophagitis  Controlled on PPI. 6. Essential hypertension  Controlled on the current medication. Wendy Talley was instructed to follow up in the clinic in 6 months for check up or as needed with any medical issues. Subjective   SUBJECTIVE/OBJECTIVE:  Wendy Talley presents for a check up on her medical conditions HTN, GERD, Depression, Hyperlipidemia. Wendy Talley denies new problems. Medications were reviewed with Wendy Talley, she is  tolerating the medication. Bowels are regular. There has not been rectal bleeding.   Wendy Talley denies urinary Family:       Frequency of Social Gatherings with Friends and Family:       Attends Sikhism Services:       Active Member of Clubs or Organizations:       Attends Club or Organization Meetings:       Marital Status:   Intimate Partner Violence:       Fear of Current or Ex-Partner:       Emotionally Abused:       Physically Abused:       Sexually Abused:     Review of patient's family history indicates:  Problem: Diabetes      Relation: Mother          Age of Onset: (Not Specified)  Problem: Cancer      Relation: Mother          Age of Onset: (Not Specified)  Problem: Depression      Relation: Father          Age of Onset: (Not Specified)  Problem: High Blood Pressure      Relation: Father          Age of Onset: (Not Specified)      Current Outpatient Medications on File Prior to Visit:  hydroCHLOROthiazide (HYDRODIURIL) 25 MG tablet, TAKE 1 TABLET BY MOUTH EVERY DAY, Disp: 30 tablet, Rfl: 5  atorvastatin (LIPITOR) 40 MG tablet, TAKE 1 TABLET BY MOUTH EVERY DAY, Disp: 30 tablet, Rfl: 5  potassium chloride (KLOR-CON M) 20 MEQ extended release tablet, TAKE 1 TABLET BY MOUTH EVERY DAY, Disp: 30 tablet, Rfl: 5  pantoprazole (PROTONIX) 40 MG tablet, TAKE 1 TABLET BY MOUTH TWICE DAILY, Disp: 60 tablet, Rfl: 5  sertraline (ZOLOFT) 100 MG tablet, TAKE 2 TABLETS BY MOUTH EVERY DAY, Disp: 30 tablet, Rfl: 5  ALPRAZolam (XANAX) 0.5 MG tablet, TAKE 1 TABLET BY MOUTH TWICE DAILY AS NEEDED, Disp: , Rfl: 2  norethindrone-ethinyl estradiol (ORTHO-NOVUM 7/7/7, 28,) 0.5/0.75/1-35 MG-MCG per tablet, Take 1 tablet by mouth daily. , Disp: 1 packet, Rfl: 3  (DISCONTINUED) hydroCHLOROthiazide (HYDRODIURIL) 25 MG tablet, TAKE 1 TABLET BY MOUTH EVERY DAY, Disp: 30 tablet, Rfl: 5  albuterol sulfate  (90 Base) MCG/ACT inhaler, Inhale 2 puffs into the lungs every 4 hours as needed for Wheezing or Shortness of Breath (Space out to every 6 hours as symptoms improve), Disp: 1 Inhaler, Rfl: 0  ondansetron (ZOFRAN) 4 MG tablet, TAKE 1 TABLET BY MOUTH EVERY 8 HOURS AS NEEDED FOR NAUSEA / vomiting, Disp: 40 tablet, Rfl: 1  Blood Pressure Monitor KIT, Monitor BP daily. , Disp: 1 kit, Rfl: 0    No current facility-administered medications on file prior to visit.        -- Medrol (Methylprednisolone) -- Itching   -- Other -- Swelling    --  EGG PLANT   -- Pcn (Penicillins) -- Rash      Lab Results       Component                Value               Date                       NA                       138                 09/10/2021                 K                        4.1                 09/10/2021                 CL                       102                 09/10/2021                 CO2                      25                  09/10/2021                 BUN                      8                   09/10/2021                 CREATININE               0.60                09/10/2021                 GLUCOSE                  99                  09/10/2021                 CALCIUM                  9.3                 09/10/2021                 PROT                     7.4                 09/10/2021                 LABALBU                  3.7                 09/10/2021                 BILITOT                  0.34                09/10/2021                 ALKPHOS                  106 (H)             09/10/2021                 AST                      18                  09/10/2021                 ALT                      13                  09/10/2021                 LABGLOM                  >60                 09/10/2021                 GFRAA                    >60                 09/10/2021              Lab Results       Component                Value               Date                       LABA1C                   5.5                 12/22/2020            Lab Results       Component                Value               Date                       EAG                      111                 12/22/2020              Lab Results       Component                Value Date                       CHOL                     194                 09/10/2021                 CHOL                     201 (H)             12/22/2020                 CHOL                     214 (H)             02/03/2020            Lab Results       Component                Value               Date                       TRIG                     121                 09/10/2021                 TRIG                     115                 12/22/2020                 TRIG                     148                 02/03/2020            Lab Results       Component                Value               Date                       HDL                      63                  09/10/2021                 HDL                      68                  12/22/2020                 HDL                      68                  02/03/2020            Lab Results       Component                Value               Date                       LDLCHOLESTEROL           107                 09/10/2021                 LDLCHOLESTEROL           110                 12/22/2020                 LDLCHOLESTEROL           116                 02/03/2020            Lab Results       Component                Value               Date                       VLDL                     NOT REPORTED        09/10/2021                 VLDL                     NOT REPORTED        12/22/2020                 VLDL                     NOT REPORTED        02/03/2020            Lab Results       Component                Value               Date                       CHOLHDLRATIO             3.1                 09/10/2021                 CHOLHDLRATIO             3.0                 12/22/2020                 CHOLHDLRATIO             3.1                 02/03/2020                              Hypertension  This is a chronic problem. The current episode started more than 1 year ago. The problem is unchanged. The problem is controlled.  Pertinent negatives include no chest pain, neck pain, palpitations or shortness of breath. Past treatments include diuretics. The current treatment provides significant improvement. There are no compliance problems. There is no history of angina. Gastroesophageal Reflux  She reports no abdominal pain, no chest pain, no coughing, no nausea or no sore throat. This is a chronic problem. The current episode started more than 1 year ago. The problem occurs rarely. The problem has been unchanged. She has tried a PPI for the symptoms. The treatment provided significant relief. Hyperlipidemia  Pertinent negatives include no chest pain, myalgias or shortness of breath. Review of Systems   Constitutional: Negative. HENT: Negative for congestion, ear pain, rhinorrhea, sneezing and sore throat. Eyes: Negative for visual disturbance. Respiratory: Negative for cough, chest tightness and shortness of breath. Cardiovascular: Negative for chest pain and palpitations. Gastrointestinal: Negative for abdominal pain, blood in stool, constipation, diarrhea and nausea. Genitourinary: Negative for difficulty urinating, dysuria, frequency, menstrual problem and urgency. Musculoskeletal: Negative for arthralgias, joint swelling, myalgias and neck pain. Skin: Negative. Neurological: Negative for syncope. Psychiatric/Behavioral: Negative. Objective   Physical Exam  Constitutional:       Appearance: She is well-developed. She is obese. HENT:      Head: Atraumatic. Eyes:      Conjunctiva/sclera: Conjunctivae normal.   Cardiovascular:      Rate and Rhythm: Normal rate and regular rhythm. Heart sounds: Normal heart sounds. Pulmonary:      Effort: Pulmonary effort is normal.      Breath sounds: Normal breath sounds. Abdominal:      Palpations: Abdomen is soft. Tenderness: There is no abdominal tenderness. Musculoskeletal:         General: Normal range of motion.       Cervical back: Normal range of motion and neck supple. Lymphadenopathy:      Cervical: No cervical adenopathy. Skin:     Findings: No rash. Neurological:      Mental Status: She is alert. Psychiatric:         Behavior: Behavior normal.         Thought Content: Thought content normal.                  An electronic signature was used to authenticate this note.     --Higinio Levine MD

## 2021-09-14 NOTE — PATIENT INSTRUCTIONS
Survey: You may be receiving a survey from [employer] regarding your visit today. You may get this in the mail, through your MyChart or in your email. Please complete the survey to enable us to provide the highest quality of care to you and your family. Please also, mention our names. If you cannot score us as very good (5 Stars) on any question, please feel free to call the office to discuss how we could have made your experience exceptional.      Thank You! MD Lisa Goncalves, Bibiana Olson, BSN RN    Malissa Staley, [facility]      1. Mixed hypercholesterolemia and hypertriglyceridemia  Controlled on Lipitor.  - Comprehensive Metabolic Panel; Future  - Lipid Panel; Future    2. Major depressive disorder with single episode, in full remission [facility])  Follows with Psychiatry. 3. Hyperglycemia  HgbA1C in 6 months. - Comprehensive Metabolic Panel; Future  - Hemoglobin A1C; Future    4. Class 3 severe obesity due to excess calories without serious comorbidity with body mass index (BMI) of 45.0 to 49.9 in adult [facility])  Continue to work on wt loss and exercise. 5. Gastroesophageal reflux disease without esophagitis  Controlled on PPI. 6. Essential hypertension  Controlled on the current medication. Talisha Jaffe was instructed to follow up in the clinic in 6 months for check up or as needed with any medical issues.

## 2021-10-05 DIAGNOSIS — I10 ESSENTIAL HYPERTENSION: ICD-10-CM

## 2021-10-05 DIAGNOSIS — K21.9 GASTROESOPHAGEAL REFLUX DISEASE WITHOUT ESOPHAGITIS: ICD-10-CM

## 2021-10-05 DIAGNOSIS — R10.13 EPIGASTRIC ABDOMINAL PAIN: ICD-10-CM

## 2021-10-07 RX ORDER — POTASSIUM CHLORIDE 20 MEQ/1
TABLET, EXTENDED RELEASE ORAL
Qty: 30 TABLET | Refills: 5 | Status: SHIPPED | OUTPATIENT
Start: 2021-10-07 | End: 2022-04-01

## 2021-10-07 RX ORDER — PANTOPRAZOLE SODIUM 40 MG/1
TABLET, DELAYED RELEASE ORAL
Qty: 60 TABLET | Refills: 5 | Status: SHIPPED | OUTPATIENT
Start: 2021-10-07 | End: 2022-04-01

## 2021-10-07 NOTE — TELEPHONE ENCOUNTER
Health Maintenance   Topic Date Due    Hepatitis C screen  Never done    COVID-19 Vaccine (1) Never done    Cervical cancer screen  05/04/2020    Shingles Vaccine (1 of 2) Never done    Flu vaccine (1) Never done    Annual Wellness Visit (AWV)  10/21/2021    Lipid screen  09/10/2022    Potassium monitoring  09/10/2022    Creatinine monitoring  09/10/2022    Breast cancer screen  04/08/2023    Colon cancer screen fecal DNA test (Cologuard)  11/04/2023    DTaP/Tdap/Td vaccine (2 - Td or Tdap) 05/28/2025    HIV screen  Completed    Hepatitis A vaccine  Aged Out    Hepatitis B vaccine  Aged Out    Hib vaccine  Aged Out    Meningococcal (ACWY) vaccine  Aged Out    Pneumococcal 0-64 years Vaccine  Aged Out             (applicable per patient's age: Cancer Screenings, Depression Screening, Fall Risk Screening, Immunizations)    Hemoglobin A1C (%)   Date Value   12/22/2020 5.5   02/03/2020 5.4     LDL Cholesterol (mg/dL)   Date Value   09/10/2021 107     AST (U/L)   Date Value   09/10/2021 18     ALT (U/L)   Date Value   09/10/2021 13     BUN (mg/dL)   Date Value   09/10/2021 8      (goal A1C is < 7)   (goal LDL is <100) need 30-50% reduction from baseline     BP Readings from Last 3 Encounters:   09/14/21 139/74   03/02/21 130/82   12/11/20 (!) 144/88    (goal /80)      All Future Testing planned in [facility]:  Lab Frequency Next Occurrence   Comprehensive Metabolic Panel Once 75/90/2929   Hemoglobin A1C Once 03/14/2022   Lipid Panel Once 03/14/2022       Next Visit Date:  Future Appointments   Date Time Provider Sheldon Berry   10/21/2021  1:00 PM Dori Castillo MD [facility] [address]   3/14/2022  2:30 PM Dori Castillo MD [facility] [address]            Patient Active Problem List:     Mixed hypercholesterolemia and hypertriglyceridemia     Anxiety     Depression     Urinary urgency     Hiatal hernia     Symptomatic cholelithiasis     Renal colic on right side     Gastroesophageal reflux disease without esophagitis     Urethral stenosis     Major depressive disorder with single episode, in full remission ([facility]     Class 3 severe obesity without serious comorbidity in adult [facility])     Hyperglycemia     Essential hypertension

## 2021-10-14 ENCOUNTER — OFFICE VISIT (OUTPATIENT)
Dept: PRIMARY CARE CLINIC | Age: 51
End: 2021-10-14
Payer: MEDICARE

## 2021-10-14 ENCOUNTER — TELEPHONE (OUTPATIENT)
Dept: FAMILY MEDICINE CLINIC | Age: 51
End: 2021-10-14

## 2021-10-14 ENCOUNTER — HOSPITAL ENCOUNTER (OUTPATIENT)
Dept: PREADMISSION TESTING | Age: 51
Setting detail: SPECIMEN
Discharge: HOME | End: 2021-10-14
Payer: MEDICARE

## 2021-10-14 VITALS
HEART RATE: 88 BPM | OXYGEN SATURATION: 98 % | WEIGHT: 256 LBS | SYSTOLIC BLOOD PRESSURE: 111 MMHG | TEMPERATURE: 98.1 F | HEIGHT: 62 IN | BODY MASS INDEX: 47.11 KG/M2 | RESPIRATION RATE: 14 BRPM | DIASTOLIC BLOOD PRESSURE: 69 MMHG

## 2021-10-14 DIAGNOSIS — R09.81 SINUS CONGESTION: ICD-10-CM

## 2021-10-14 DIAGNOSIS — J06.9 VIRAL URI WITH COUGH: ICD-10-CM

## 2021-10-14 DIAGNOSIS — J06.9 VIRAL URI WITH COUGH: Primary | ICD-10-CM

## 2021-10-14 DIAGNOSIS — R50.9 FEVER, UNSPECIFIED FEVER CAUSE: ICD-10-CM

## 2021-10-14 PROCEDURE — 1036F TOBACCO NON-USER: CPT | Performed by: NURSE PRACTITIONER

## 2021-10-14 PROCEDURE — C9803 HOPD COVID-19 SPEC COLLECT: HCPCS

## 2021-10-14 PROCEDURE — 99213 OFFICE O/P EST LOW 20 MIN: CPT | Performed by: NURSE PRACTITIONER

## 2021-10-14 PROCEDURE — U0003 INFECTIOUS AGENT DETECTION BY NUCLEIC ACID (DNA OR RNA); SEVERE ACUTE RESPIRATORY SYNDROME CORONAVIRUS 2 (SARS-COV-2) (CORONAVIRUS DISEASE [COVID-19]), AMPLIFIED PROBE TECHNIQUE, MAKING USE OF HIGH THROUGHPUT TECHNOLOGIES AS DESCRIBED BY CMS-2020-01-R: HCPCS

## 2021-10-14 PROCEDURE — G8427 DOCREV CUR MEDS BY ELIG CLIN: HCPCS | Performed by: NURSE PRACTITIONER

## 2021-10-14 PROCEDURE — U0005 INFEC AGEN DETEC AMPLI PROBE: HCPCS

## 2021-10-14 PROCEDURE — 3017F COLORECTAL CA SCREEN DOC REV: CPT | Performed by: NURSE PRACTITIONER

## 2021-10-14 PROCEDURE — G8484 FLU IMMUNIZE NO ADMIN: HCPCS | Performed by: NURSE PRACTITIONER

## 2021-10-14 PROCEDURE — G8417 CALC BMI ABV UP PARAM F/U: HCPCS | Performed by: NURSE PRACTITIONER

## 2021-10-14 RX ORDER — DEXTROMETHORPHAN HYDROBROMIDE, GUAIFENESIN AND PSEUDOEPHEDRINE HYDROCHLORIDE 15; 400; 60 MG/1; MG/1; MG/1
TABLET ORAL
Qty: 28 TABLET | Refills: 0 | Status: SHIPPED | OUTPATIENT
Start: 2021-10-14 | End: 2021-10-21

## 2021-10-14 NOTE — PROGRESS NOTES
Chief Complaint   URI (x 5 days. Congestion, coughing, mild fever ())      History of Present Illness   Source of history provided by: patient. Niurka Johnson is a 46 y.o. old female who has a past medical history of:   Past Medical History:   Diagnosis Date    Anxiety     Asthma     Class 3 obesity due to excess calories without serious comorbidity with body mass index (BMI) of 45.0 to 49.9 in adult 5/15/2018    Crohn's disease ([facility]     Depression     GERD (gastroesophageal reflux disease)     Hyperlipidemia     Hypertension     Wears glasses     Presents to the clinic for evaluation of  4 days of dry cough, sinus congestion, runny nose, subjective fever. According to Ravi Dubon, the cough seems to worsen at night \"it's all the sinus drainage and it's irritating my throat\". She has tried over-the-counter Benadryl and Tylenol for symptoms without improvement. Ravi Dubon reports that she was with her grandchildren last week who were ill with similar symptoms. Denies CP, dyspnea, LE edema, abdominal pain, vomiting, rash, or lethargy. ROS   Pertinent positives and negatives are stated within HPI, all other systems reviewed and are negative. Surgical History:  has a past surgical history that includes  section; Mouth surgery; Cystoscopy (2015); and Cholecystectomy, laparoscopic (9/25/15). Social History:  reports that she has never smoked. She has never used smokeless tobacco. She reports that she does not drink alcohol and does not use drugs. Family History: family history includes Cancer in her mother; Depression in her father; Diabetes in her mother; High Blood Pressure in her father.   Allergies: Medrol [methylprednisolone], Other, and Pcn [penicillins]    Physical Exam    VS:  /69   Pulse 88   Temp 98.1 °F (36.7 °C)   Resp 14   Ht 5' 2\" (1.575 m)   Wt 256 lb (116.1 kg)   SpO2 98%   BMI 46.82 kg/m²    Oxygen Saturation Interpretation: Normal.    Constitutional:  Alert, development consistent with age. NAD. Head:  NC/NT. Airway patent. Mouth: Posterior pharynx with mild erythema and clear postnasal drip. No tonsillar hypertrophy or exudate. Neck:  Normal ROM. Supple. No anterior cervical adenopathy noted. Lungs: CTAB without wheezes, rales, or rhonchi. CV:  Regular rate and rhythm, normal heart sounds, without ectopy, gallops, or rubs. Skin:  Normal turgor. Warm, dry, without visible rash. Lymphatic: No lymphangitis or adenopathy noted. Neurological:  Oriented. Motor functions intact. Lab / Imaging Results   (All laboratory and radiology results have been personally reviewed by myself)  Labs:  No results found for this visit on 10/14/21. Imaging: All Radiology results interpreted by Radiologist unless otherwise noted. No results found. Medical Decision Making   Pt non-toxic, in no apparent distress and stable at time of discharge. Assessment/Plan   Eber Gutierrez was seen today for uri. Diagnoses and all orders for this visit:    Viral URI with cough  -     COVID-19; Future  -     Pseudoephedrine-DM-GG (CAPMIST DM) 60- MG TABS; 1 capsule every 6 hours by mouth as needed for cough and congestion. Do not exceed 4 capsules per day. Fever, unspecified fever cause  -     COVID-19; Future    Sinus congestion  -     COVID-19; Future  -     Pseudoephedrine-DM-GG (CAPMIST DM) 60- MG TABS; 1 capsule every 6 hours by mouth as needed for cough and congestion. Do not exceed 4 capsules per day. 46y.o. year old female presenting with noncongested cough with sinus congestion and fever. Eber Gutierrez appears well, hydrated and with clear lung sounds without distress. COVID-19 outpatient stat order given with instructions to have done at [facility], will call with results once available. Advised cautionary self-quarantine at home in the interim.   Capmist DM prescribed to help with symptoms, administration side effects discussed. Increase fluids and rest. Symptomatic relief discussed including Tylenol prn pain/fever. Schedule virtual f/u with PCP if symptoms persist. ED sooner if symptoms worsen or change. Saul Tinajero, APRN - CNP    This visit was provided as a focused evaluation during the [place] -19 pandemic/national emergency. A comprehensive review of all previous patient history and testing was not conducted. Pertinent findings were elicited during the visit.

## 2021-10-14 NOTE — PATIENT INSTRUCTIONS
Patient Education        Learning About Coronavirus [phone])  What is coronavirus (COVID-19)? COVID-19 is a disease caused by a type of coronavirus. This illness was first found in December 2019. It has since spread worldwide. Coronaviruses are a large group of viruses. They cause the common cold. They also cause more serious illnesses like [place] respiratory syndrome (MERS) and severe acute respiratory syndrome (SARS). COVID-19 is caused by a novel coronavirus. That means it's a new type that has not been seen in people before. What are the symptoms? COVID-19 symptoms may include:  · Fever. · Cough. · Trouble breathing. · Chills or repeated shaking with chills. · Muscle and body aches. · Headache. · Sore throat. · New loss of taste or smell. · Vomiting. · Diarrhea. In severe cases, COVID-19 can cause pneumonia and make it hard to breathe without help from a machine. It can cause death. How is it diagnosed? COVID-19 is diagnosed with a viral test. This may also be called a PCR test or antigen test. It looks for evidence of the virus in your breathing passages or lungs (respiratory system). The test is most often done on a sample from the nose, throat, or lungs. It's sometimes done on a sample of saliva. One way a sample is collected is by putting a long swab into the back of your nose. How is it treated? Mild cases of COVID-19 can be treated at home. Serious cases need treatment in the hospital. Treatment may include medicines to reduce symptoms, plus breathing support such as oxygen therapy or a ventilator. Some people may be placed on their belly to help their oxygen levels. Treatments that may help people who have COVID-19 include:  Antiviral medicines. These medicines treat viral infections. Remdesivir is an example. Immune-based therapy. These medicines help the immune system fight COVID-19. Examples include monoclonal antibodies. Blood thinners.    These medicines help prevent blood clots. People with severe illness are at risk for blood clots. How can you protect yourself and others? The best way to protect yourself from getting sick is to:  · Get vaccinated. · Avoid sick people. · If you are not fully vaccinated:  ? Wear a mask if you have to go to public areas. ? Avoid crowds and try to stay at least 6 feet away from other people. · Cover your mouth with a tissue when you cough or sneeze. · Wash your hands often, especially after you cough or sneeze. Use soap and water, and scrub for at least 20 seconds. If soap and water aren't available, use an alcohol-based hand . · Avoid touching your mouth, nose, and eyes. To help avoid spreading the virus to others:  · Get vaccinated. · Cover your mouth with a tissue when you cough or sneeze. · Wash your hands often, especially after you cough or sneeze. Use soap and water, and scrub for at least 20 seconds. If soap and water aren't available, use an alcohol-based hand . · If you have been exposed to the virus and are not fully vaccinated:  ? Stay home. Don't go to school, work, or public areas. And don't use public transportation, ride-shares, or taxis unless you have no choice. ? Wear a mask if you have to go to public areas, like the pharmacy. · If you're sick:  ? Leave your home only if you need to get medical care. But call the doctor's office first so they know you're coming. And wear a mask. ? Wear a mask whenever you're around other people. ? Limit contact with pets and people in your home. If possible, stay in a separate bedroom and use a separate bathroom. ? Clean and disinfect your home every day. Use household  and disinfectant wipes or sprays. Take special care to clean things that you touch with your hands. How can you self-isolate when you have COVID-19? If you have COVID-19, there are things you can do to help avoid spreading the virus to others.   · Limit contact with people in your home. If possible, stay in a separate bedroom and use a separate bathroom. · Wear a mask when you are around other people. · If you have to leave home, avoid crowds and try to stay at least 6 feet away from other people. · Avoid contact with pets and other animals. · Cover your mouth and nose with a tissue when you cough or sneeze. Then throw it in the trash right away. · Wash your hands often, especially after you cough or sneeze. Use soap and water, and scrub for at least 20 seconds. If soap and water aren't available, use an alcohol-based hand . · Don't share personal household items. These include bedding, towels, cups and glasses, and eating utensils. · [address] in the warmest water allowed for the fabric type, and dry it completely. It's okay to wash other people's laundry with yours. · Clean and disinfect your home. Use household  and disinfectant wipes or sprays. When should you call for help? Call 911 anytime you think you may need emergency care. For example, call if you have life-threatening symptoms, such as:    · You have severe trouble breathing. (You can't talk at all.)     · You have constant chest pain or pressure.     · You are severely dizzy or lightheaded.     · You are confused or can't think clearly.     · You have pale, gray, or blue-colored skin or lips.     · You pass out (lose consciousness) or are very hard to wake up. Call your doctor now or seek immediate medical care if:    · You have moderate trouble breathing. (You can't speak a full sentence.)     · You are coughing up blood (more than about 1 teaspoon).     · You have signs of low blood pressure. These include feeling lightheaded; being too weak to stand; and having cold, pale, clammy skin.    Watch closely for changes in your health, and be sure to contact your doctor if:    · Your symptoms get worse.     · You are not getting better as expected.     · You have new or worse symptoms of anxiety, depression, nightmares, or flashbacks. Call before you go to the doctor's office. Follow their instructions. And wear a mask. Current as of: July 1, 2021               Content Version: 13.0  © 2006-2021 Healthwise, [facility]. Care instructions adapted under license by [facility] ([facility]). If you have questions about a medical condition or this instruction, always ask your healthcare professional. [place] [address] any warranty or liability for your use of this information. Patient Education        dextromethorphan and guaifenesin  Pronunciation:  DEX troe me THOR fan and gwye FEN e sin  Brand:  [place] DM, Broncotron, Coricidin HBP Chest Congestion & Cough, DayQuil Mucus Control DM, Delsym Cough Plus Chest Congestion DM, Fenesin DM IR, G-Zyncof, Mucinex DM, Phlemex, Robitussin Cough + Chest Congestion DM, Safetussin DM, Siltussin DM, TabTussin DM, Tussin DM, Zyncof  What is the most important information I should know about dextromethorphan and guaifenesin? Do not use this medicine if you have used an MAO inhibitor in the past 14 days, such as isocarboxazid, linezolid, methylene blue injection, phenelzine, rasagiline, selegiline, or tranylcypromine. What is dextromethorphan and guaifenesin? Dextromethorphan is a cough suppressant. Guaifenesin is an expectorant. Dextromethorphan and guaifenesin is a combination medicine used to treat cough and chest congestion caused by the common cold or allergies. Dextromethorphan will not treat a cough that is caused by smoking. There are many brands and forms of this medication available and not all brands are listed on this leaflet. Dextromethorphan and guaifenesin may also be used for purposes not listed in this medication guide. What should I discuss with my healthcare provider before taking dextromethorphan and guaifenesin? Do not use this medicine if you have taken an MAO inhibitor in the past 14 days. A dangerous drug interaction could occur. MAO inhibitors include isocarboxazid, linezolid, methylene blue injection, phenelzine, rasagiline, selegiline, and tranylcypromine. Ask a doctor or pharmacist if this medicine is safe to use if you have:  · a cough with mucus; or  · asthma, emphysema, or chronic bronchitis. Ask a doctor before using this medicine if you are pregnant or breast-feeding. This medicine may contain phenylalanine. Check the medication label if you have phenylketonuria (PKU). How should I take dextromethorphan and guaifenesin? Use exactly as directed on the label, or as prescribed by your doctor. Cold or cough medicine is only for short-term use until your symptoms clear up. Always follow directions on the medicine label about giving cough or cold medicine to a child. Do not use the medicine only to make a child sleepy. Death can occur from the misuse of cough or cold medicines in very young children. Measure liquid medicine carefully. Use the dosing syringe provided, or use a medicine dose-measuring device (not a kitchen spoon). You may need to shake the liquid before you measure a dose. Follow all directions on the label. Swallow the extended-release tablet  whole and do not crush, chew, or break it. Sprinkle the granules  directly onto your tongue and swallow right away. Drink extra fluids to help loosen the congestion and lubricate your throat while you are taking this medicine. Call your doctor if your symptoms do not improve after 7 days, or if you have a fever, rash, or headaches. If you need to have any type of surgery, tell the surgeon ahead of time if you have taken this medicine within the past few days. Store at room temperature away from moisture and heat. What happens if I miss a dose? Since dextromethorphan and guaifenesin is used when needed, you may not be on a dosing schedule. Skip any missed dose if it's almost time for your next dose. Do not use two doses at one time. What happens if I overdose?   Seek emergency medical attention or call the Poison Help line at 1-595.748.2323. What should I avoid while taking dextromethorphan and guaifenesin? Avoid driving or hazardous activity until you know how this medicine will affect you. Your reactions could be impaired. Drinking alcohol with this medicine can cause side effects. Ask a doctor or pharmacist before using other cough or cold medicines that may contain similar ingredients. What are the possible side effects of dextromethorphan and guaifenesin? Get emergency medical help if you have signs of an allergic reaction: hives; difficult breathing; swelling of your face, lips, tongue, or throat. Stop using this medicine and call your doctor at once if you have:  · mood changes;  · severe headache; or  · severe dizziness or anxiety, feeling like you might pass out;. Common side effects may include:  · dizziness, drowsiness;  · sleep problems (insomnia);  · diarrhea; or  · feeling nervous, restless, anxious, or irritable;. This is not a complete list of side effects and others may occur. Call your doctor for medical advice about side effects. You may report side effects to FDA at 6-040-HES-2725. What other drugs will affect dextromethorphan and guaifenesin? Avoid using this medicine with other drugs that cause drowsiness or slow your breathing (such as opioid medicine, a muscle relaxer, or medicine for anxiety or seizures). Ask a doctor or pharmacist before using this medicine if you are also using any other drugs, including prescription and over-the-counter medicines, vitamins, and herbal products. Some medicines can cause unwanted or dangerous effects when used together. Not all possible interactions are listed in this medication guide. Where can I get more information? Your pharmacist can provide more information about dextromethorphan and guaifenesin.   Remember, keep this and all other medicines out of the reach of children, never share your medicines with others, and use this medication only for the indication prescribed. Every effort has been made to ensure that the information provided by [address] is accurate, up-to-date, and complete, but no guarantee is made to that effect. Drug information contained herein may be time sensitive. [facility] information has been compiled for use by healthcare practitioners and consumers in the [place] and therefore [facility] does not warrant that uses outside of the [place] are appropriate, unless specifically indicated otherwise. [facility]'s drug information does not endorse drugs, diagnose patients or recommend therapy. [facility]'s drug information is an informational resource designed to assist licensed healthcare practitioners in caring for their patients and/or to serve consumers viewing this service as a supplement to, and not a substitute for, the expertise, skill, knowledge and judgment of healthcare practitioners. The absence of a warning for a given drug or drug combination in no way should be construed to indicate that the drug or drug combination is safe, effective or appropriate for any given patient. [facility] does not assume any responsibility for any aspect of healthcare administered with the aid of information [facility] provides. The information contained herein is not intended to cover all possible uses, directions, precautions, warnings, drug interactions, allergic reactions, or adverse effects. If you have questions about the drugs you are taking, check with your doctor, nurse or pharmacist.  Copyright 3706-7228 [address]: 6. 11. Revision date: 11/2/2018. Care instructions adapted under license by [facility] ([facility]). If you have questions about a medical condition or this instruction, always ask your healthcare professional. [place] [address] any warranty or liability for your use of this information.      Patient Education        pseudoephedrine  Pronunciation: MICHELA flores ee FED rin  Brand:  Chlor Trimeton Nasal Decongestant, Contac Cold, Drixoral Decongestant Non-Drowsy, Elixsure Decongestant, Genaphed, Nasofed, [place], [place], [place], Sudafed Children's Nasal Decongestant, SudoGest, Suphedrin, Triaminic Softchews Allergy Congestion  What is the most important information I should know about pseudoephedrine? Do not use pseudoephedrine if you have used an MAO inhibitor in the past 14 days, such as isocarboxazid, linezolid, methylene blue injection, phenelzine, rasagiline, selegiline, or tranylcypromine. Always ask a doctor before giving a cough or cold medicine to a child. What is pseudoephedrine? Pseudoephedrine is a decongestant that shrinks blood vessels in the nasal passages. Dilated blood vessels can cause nasal congestion (stuffy nose). Pseudoephedrine is used to treat nasal and sinus congestion, or congestion of the tubes that drain fluid from your inner ears, called the eustachian (trinity-STAY-shun) tubes. Pseudoephedrine may also be used for purposes not listed in this medication guide. What should I discuss with my healthcare provider before taking pseudoephedrine? You should not use this medicine if you are allergic to pseudoephedrine. Do not use pseudoephedrine if you have used an MAO inhibitor in the past 14 days. A dangerous drug interaction could occur. MAO inhibitors include isocarboxazid, linezolid, methylene blue injection, phenelzine, rasagiline, selegiline, tranylcypromine, and others. Ask a doctor or pharmacist if it is safe for you to use pseudoephedrine if you have other medical conditions, especially:  · heart disease or high blood pressure;  · enlarged prostate and urination problems;  · diabetes; or  · a thyroid disorder. Artificially sweetened liquid medicine may contain phenylalanine. Check the medication label if you have phenylketonuria (PKU). It is not known whether pseudoephedrine will harm an unborn baby.  Do not use this medicine without a doctor's advice if you are pregnant. It is not known whether pseudoephedrine passes into breast milk or if it could harm a nursing baby. Do not use this medicine without a doctor's advice if you are breast-feeding a baby. How should I take pseudoephedrine? Use exactly as directed on the label, or as prescribed by your doctor. Do not use in larger or smaller amounts or for longer than recommended. Cold medicine is usually taken only for a short time until your symptoms clear up. Do not give this medication to a child younger than 3years old. Always ask a doctor before giving a cough or cold medicine to a child. Death can occur from the misuse of cough and cold medicines in very young children. Take this medicine with a full glass of water. Do not crush, chew, or break an extended-release tablet. Swallow it whole. You may need to shake the oral suspension (liquid) well just before you measure a dose. Measure liquid medicine with the dosing syringe provided, or with a special dose-measuring spoon or medicine cup. If you do not have a dose-measuring device, ask your pharmacist for one. Call your doctor if your symptoms do not improve after 7 days of treatment, or if you have a fever with a headache, cough, or skin rash. If you need surgery, tell the surgeon ahead of time that you are using pseudoephedrine. You may need to stop using the medicine for a short time. Store at room temperature away from moisture and heat. Protect the liquid medicine from light, and do not allow it to freeze. What happens if I miss a dose? Since pseudoephedrine is used when needed, you may not be on a dosing schedule. If you are on a schedule, use the missed dose as soon as you remember. Skip the missed dose if it is almost time for your next scheduled dose. Do not use extra medicine to make up the missed dose. What happens if I overdose?   Seek emergency medical attention or call the Poison Help line at 1-928.864.2487. Overdose symptoms may include hallucinations, slow heartbeats, or seizure (convulsions). What should I avoid while taking pseudoephedrine? Avoid taking this medication if you also take diet pills, caffeine pills, or other stimulants (such as ADHD medications). Taking a stimulant together with a decongestant can increase your risk of unpleasant side effects. Ask a doctor or pharmacist before using any other cough or cold medicine. Many combination medicines contain pseudoephedrine or another decongestant. Taking certain products together can cause you to get too much of this type of medicine. What are the possible side effects of pseudoephedrine? Get emergency medical help if you have signs of an allergic reaction: hives; difficult breathing; swelling of your face, lips, tongue, or throat. Stop using pseudoephedrine and call your doctor at once if you have:  · fast or pounding heartbeats;  · severe dizziness;  · severe nervousness; or  · sleep problems (insomnia). Common side effects may include:  · feeling restless or nervous. This is not a complete list of side effects and others may occur. Call your doctor for medical advice about side effects. You may report side effects to FDA at 7-700-CLK-9553. What other drugs will affect pseudoephedrine? Other drugs may interact with pseudoephedrine, including prescription and over-the-counter medicines, vitamins, and herbal products. Tell each of your health care providers about all medicines you use now and any medicine you start or stop using. Where can I get more information? Your pharmacist can provide more information about pseudoephedrine. Remember, keep this and all other medicines out of the reach of children, never share your medicines with others, and use this medication only for the indication prescribed.    Every effort has been made to ensure that the information provided by [address] is accurate, up-to-date, and complete, but no guarantee is made to that effect. Drug information contained herein may be time sensitive. [facility] information has been compiled for use by healthcare practitioners and consumers in the [facility] and therefore [facility] does not warrant that uses outside of the [facility] are appropriate, unless specifically indicated otherwise. [facility]'s drug information does not endorse drugs, diagnose patients or recommend therapy. [facility]'s drug information is an informational resource designed to assist licensed healthcare practitioners in caring for their patients and/or to serve consumers viewing this service as a supplement to, and not a substitute for, the expertise, skill, knowledge and judgment of healthcare practitioners. The absence of a warning for a given drug or drug combination in no way should be construed to indicate that the drug or drug combination is safe, effective or appropriate for any given patient. [facility] does not assume any responsibility for any aspect of healthcare administered with the aid of information [facility] provides. The information contained herein is not intended to cover all possible uses, directions, precautions, warnings, drug interactions, allergic reactions, or adverse effects. If you have questions about the drugs you are taking, check with your doctor, nurse or pharmacist.  Copyright 3289-8519 [address]: 7.03. Revision date: 2/1/2016. Care instructions adapted under license by [facility] ([facility]). If you have questions about a medical condition or this instruction, always ask your healthcare professional. [address] any warranty or liability for your use of this information.

## 2021-10-14 NOTE — TELEPHONE ENCOUNTER
10/14-Spoke with patient. She took home COVID test on Tuesday and was negative. Patient wanting appt for antibiotic. Advised patient per Dr Rodrigue Wylie recommendation, she can get rapid test at [facility] and make appt in office if results are negative or go to [facility]. Patient states she will go to [facility] today. [facility] RN          ----- Message from Xiao Metcalf sent at 10/14/2021 10:48 AM EDT -----  Subject: Appointment Request    Reason for Call: Routine (Patient Request) No Script    QUESTIONS  Type of Appointment? Established Patient  Reason for appointment request? Available appointments did not meet   patient need  Additional Information for Provider? pt would like to know if she can come   in for a antibiotic medication. pt would like to be seen today to   tomorrow-pt screened red   ---------------------------------------------------------------------------  --------------  CALL BACK INFO  What is the best way for the office to contact you? OK to leave message on   voicemail  Preferred Call Back Phone Number? [phone]  ---------------------------------------------------------------------------  --------------  SCRIPT ANSWERS  Relationship to Patient? Self  (Is the patient requesting to see the provider for a procedure?)? No  (Is the patient requesting to see the provider urgently - today or   tomorrow. )? No  Have you been diagnosed with, awaiting test results for, or told that you   are suspected of having COVID-19 (Coronavirus)? (If patient has tested   negative or was tested as a requirement for work, school, or travel and   not based on symptoms, answer no)? No  Within the past two weeks have you developed any of the following symptoms   (answer no if symptoms have been present longer than 2 weeks or began   more than 2 weeks ago)?  Fever or Chills, Cough, Shortness of breath or   difficulty breathing, Loss of taste or smell, Sore throat, Nasal   congestion, Sneezing or runny nose, Fatigue or generalized body aches   (answer no if pain is specific to a body part e.g. back pain), Diarrhea,   Headache?  Yes

## 2021-10-15 ENCOUNTER — TELEPHONE (OUTPATIENT)
Dept: FAMILY MEDICINE CLINIC | Age: 51
End: 2021-10-15

## 2021-10-15 DIAGNOSIS — J32.9 SINUSITIS, UNSPECIFIED CHRONICITY, UNSPECIFIED LOCATION: Primary | ICD-10-CM

## 2021-10-15 LAB
SARS-COV-2: NORMAL
SARS-COV-2: NOT DETECTED
SOURCE: NORMAL

## 2021-10-15 RX ORDER — AZITHROMYCIN 250 MG/1
250 TABLET, FILM COATED ORAL SEE ADMIN INSTRUCTIONS
Qty: 6 TABLET | Refills: 0 | Status: SHIPPED | OUTPATIENT
Start: 2021-10-15 | End: 2021-10-20

## 2021-10-21 ENCOUNTER — OFFICE VISIT (OUTPATIENT)
Dept: FAMILY MEDICINE CLINIC | Age: 51
End: 2021-10-21
Payer: MEDICARE

## 2021-10-21 VITALS
HEIGHT: 62 IN | SYSTOLIC BLOOD PRESSURE: 134 MMHG | BODY MASS INDEX: 47.29 KG/M2 | HEART RATE: 80 BPM | DIASTOLIC BLOOD PRESSURE: 88 MMHG | WEIGHT: 257 LBS

## 2021-10-21 DIAGNOSIS — J01.90 ACUTE BACTERIAL SINUSITIS: ICD-10-CM

## 2021-10-21 DIAGNOSIS — B96.89 ACUTE BACTERIAL SINUSITIS: ICD-10-CM

## 2021-10-21 DIAGNOSIS — Z00.00 ROUTINE GENERAL MEDICAL EXAMINATION AT A HEALTH CARE FACILITY: Primary | ICD-10-CM

## 2021-10-21 PROCEDURE — 99213 OFFICE O/P EST LOW 20 MIN: CPT | Performed by: INTERNAL MEDICINE

## 2021-10-21 PROCEDURE — G8417 CALC BMI ABV UP PARAM F/U: HCPCS | Performed by: INTERNAL MEDICINE

## 2021-10-21 PROCEDURE — 3017F COLORECTAL CA SCREEN DOC REV: CPT | Performed by: INTERNAL MEDICINE

## 2021-10-21 PROCEDURE — G8427 DOCREV CUR MEDS BY ELIG CLIN: HCPCS | Performed by: INTERNAL MEDICINE

## 2021-10-21 PROCEDURE — 1036F TOBACCO NON-USER: CPT | Performed by: INTERNAL MEDICINE

## 2021-10-21 PROCEDURE — G8484 FLU IMMUNIZE NO ADMIN: HCPCS | Performed by: INTERNAL MEDICINE

## 2021-10-21 PROCEDURE — 3288F FALL RISK ASSESSMENT DOCD: CPT | Performed by: INTERNAL MEDICINE

## 2021-10-21 RX ORDER — CEPHALEXIN 500 MG/1
500 CAPSULE ORAL 3 TIMES DAILY
Qty: 30 CAPSULE | Refills: 0 | Status: SHIPPED | OUTPATIENT
Start: 2021-10-21 | End: 2021-10-31

## 2021-10-21 ASSESSMENT — LIFESTYLE VARIABLES
HAS A RELATIVE, FRIEND, DOCTOR, OR ANOTHER HEALTH PROFESSIONAL EXPRESSED CONCERN ABOUT YOUR DRINKING OR SUGGESTED YOU CUT DOWN: 0
HOW OFTEN DO YOU HAVE SIX OR MORE DRINKS ON ONE OCCASION: 0
AUDIT-C TOTAL SCORE: 1
HAVE YOU OR SOMEONE ELSE BEEN INJURED AS A RESULT OF YOUR DRINKING: 0
HOW OFTEN DO YOU HAVE A DRINK CONTAINING ALCOHOL: 1
HOW OFTEN DURING THE LAST YEAR HAVE YOU FOUND THAT YOU WERE NOT ABLE TO STOP DRINKING ONCE YOU HAD STARTED: 0
HOW OFTEN DURING THE LAST YEAR HAVE YOU BEEN UNABLE TO REMEMBER WHAT HAPPENED THE NIGHT BEFORE BECAUSE YOU HAD BEEN DRINKING: 0
HOW OFTEN DURING THE LAST YEAR HAVE YOU FAILED TO DO WHAT WAS NORMALLY EXPECTED FROM YOU BECAUSE OF DRINKING: 0
HOW MANY STANDARD DRINKS CONTAINING ALCOHOL DO YOU HAVE ON A TYPICAL DAY: 0
HOW OFTEN DURING THE LAST YEAR HAVE YOU HAD A FEELING OF GUILT OR REMORSE AFTER DRINKING: 0
HOW OFTEN DURING THE LAST YEAR HAVE YOU NEEDED AN ALCOHOLIC DRINK FIRST THING IN THE MORNING TO GET YOURSELF GOING AFTER A NIGHT OF HEAVY DRINKING: 0
AUDIT TOTAL SCORE: 1

## 2021-10-21 ASSESSMENT — PATIENT HEALTH QUESTIONNAIRE - PHQ9
2. FEELING DOWN, DEPRESSED OR HOPELESS: 1
SUM OF ALL RESPONSES TO PHQ QUESTIONS 1-9: 2
1. LITTLE INTEREST OR PLEASURE IN DOING THINGS: 1
SUM OF ALL RESPONSES TO PHQ QUESTIONS 1-9: 2
SUM OF ALL RESPONSES TO PHQ9 QUESTIONS 1 & 2: 2
SUM OF ALL RESPONSES TO PHQ QUESTIONS 1-9: 2

## 2021-10-21 ASSESSMENT — ENCOUNTER SYMPTOMS
CONSTIPATION: 0
NAUSEA: 0
COUGH: 1
RHINORRHEA: 0
SINUS PRESSURE: 1
BLOOD IN STOOL: 0
ABDOMINAL PAIN: 0
SHORTNESS OF BREATH: 0
SORE THROAT: 0
DIARRHEA: 0
CHEST TIGHTNESS: 0

## 2021-10-21 NOTE — PATIENT INSTRUCTIONS
Survey: You may be receiving a survey from [employer] regarding your visit today. You may get this in the mail, through your MyChart or in your email. Please complete the survey to enable us to provide the highest quality of care to you and your family. Please also, mention our names. If you cannot score us as very good (5 Stars) on any question, please feel free to call the office to discuss how we could have made your experience exceptional.      Thank You! MD Addis Holly LPN Tammy, BRIANA Peters RN        Personalized Preventive Plan for Any Muro - 10/21/2021  Medicare offers a range of preventive health benefits. Some of the tests and screenings are paid in full while other may be subject to a deductible, co-insurance, and/or copay. Some of these benefits include a comprehensive review of your medical history including lifestyle, illnesses that may run in your family, and various assessments and screenings as appropriate. After reviewing your medical record and screening and assessments performed today your provider may have ordered immunizations, labs, imaging, and/or referrals for you. A list of these orders (if applicable) as well as your Preventive Care list are included within your After Visit Summary for your review. Other Preventive Recommendations:    · A preventive eye exam performed by an eye specialist is recommended every 1-2 years to screen for glaucoma; cataracts, macular degeneration, and other eye disorders. · A preventive dental visit is recommended every 6 months. · Try to get at least 150 minutes of exercise per week or 10,000 steps per day on a pedometer . · Order or download the FREE \"Exercise & Physical Activity: Your Everyday Guide\" from [employer]. Call 3-427.962.5260 or search [employer] online.   · You need 7210-1336 mg of calcium and 8617-5288 IU of vitamin D per

## 2021-10-21 NOTE — PROGRESS NOTES
Medicare Annual Wellness Visit  Name: Kailey Maillard Date: 10/21/2021   MRN: [id number] Sex: Female   Age: 46 y.o. Ethnicity: Non- / Non    : 1970 Race: White (non-)      Richard Grier is here for Medicare AWV    Screenings for behavioral, psychosocial and functional/safety risks, and cognitive dysfunction are all negative except as indicated below. These results, as well as other patient data from the [address] form, are documented in Flowsheets linked to this Encounter. Allergies   Allergen Reactions    Medrol [Methylprednisolone] Itching    Other Swelling     EGG PLANT    Pcn [Penicillins] Rash       Prior to Visit Medications    Medication Sig Taking? Authorizing Provider   pantoprazole (PROTONIX) 40 MG tablet TAKE 1 TABLET BY MOUTH TWICE DAILY Yes Boris Robles MD   potassium chloride (KLOR-CON M) 20 MEQ extended release tablet TAKE 1 TABLET BY MOUTH EVERY DAY Yes Boris Robles MD   hydroCHLOROthiazide (HYDRODIURIL) 25 MG tablet TAKE 1 TABLET BY MOUTH EVERY DAY Yes Boris Robles MD   atorvastatin (LIPITOR) 40 MG tablet TAKE 1 TABLET BY MOUTH EVERY DAY Yes Boris Robles MD   sertraline (ZOLOFT) 100 MG tablet TAKE 2 TABLETS BY MOUTH EVERY DAY Yes Boris Robles MD   ALPRAZolam (XANAX) 0.5 MG tablet TAKE 1 TABLET BY MOUTH TWICE DAILY AS NEEDED Yes Historical Provider, MD   norethindrone-ethinyl estradiol ([address] /, 28,) 0.5/0.75/1-35 MG-MCG per tablet Take 1 tablet by mouth daily.  Yes Boris Robles MD   hydroCHLOROthiazide (HYDRODIURIL) 25 MG tablet TAKE 1 TABLET BY MOUTH EVERY DAY  Boris Robles MD   albuterol sulfate  (90 Base) MCG/ACT inhaler Inhale 2 puffs into the lungs every 4 hours as needed for Wheezing or Shortness of Breath (Space out to every 6 hours as symptoms improve)  Enrique Martinez MD   ondansetron (ZOFRAN) 4 MG tablet TAKE 1 TABLET BY MOUTH EVERY 8 HOURS AS NEEDED FOR NAUSEA / vomiting  Vivian Sanders MD   Blood Pressure Monitor KIT Monitor BP daily. Rosalia Ly MD       Past Medical History:   Diagnosis Date    Anxiety     Asthma     Class 3 obesity due to excess calories without serious comorbidity with body mass index (BMI) of 45.0 to 49.9 in adult 5/15/2018    Crohn's disease ([facility] [address]     Depression     GERD (gastroesophageal reflux disease)     Hyperlipidemia     Hypertension     Wears glasses        Past Surgical History:   Procedure Laterality Date     SECTION      CHOLECYSTECTOMY, LAPAROSCOPIC  9/25/15    CYSTOSCOPY  2015    MOUTH SURGERY         Family History   Problem Relation Age of Onset    Diabetes Mother     Cancer Mother     Depression Father     High Blood Pressure Father        CareTeam (Including outside providers/suppliers regularly involved in providing care):   Patient Care Team:  Rosalia Ly MD as PCP - General (Internal Medicine)  Rosalia Ly MD as PCP - [facility] Empaneled Provider  Jd Joseph (Psychiatry)  DIOMEDES Salcedo - CNP    Wt Readings from Last 3 Encounters:   10/21/21 257 lb (116.6 kg)   10/14/21 256 lb (116.1 kg)   21 259 lb (117.5 kg)     Vitals:    10/21/21 1307   BP: 134/88   Pulse: 80   Weight: 257 lb (116.6 kg)   Height: 5' 2\" (1.575 m)     Body mass index is 47.01 kg/m². Based upon direct observation of the patient, evaluation of cognition reveals recent and remote memory intact. Patient's complete Health Risk Assessment and screening values have been reviewed and are found in Flowsheets. The following problems were reviewed today and where indicated follow up appointments were made and/or referrals ordered. Positive Risk Factor Screenings with Interventions:          General Health and ACP:  General  In general, how would you say your health is?: Good  In the past 7 days, have you experienced any of the following?  New or Increased Pain, New or Increased Fatigue, Loneliness, Social Isolation, Stress or Anger?: (!) New or Increased Fatigue  Do you get the social and emotional support that you need?: Yes  Do you have a Living Will?: (!) No  Advance Directives     Power of Shahida Butcher Will ACP-Advance Directive ACP-Power of     Not on File Not on File Not on File Not on File      General Health Risk Interventions:  · Discussed what a living will is and how to obtain one. Health Habits/Nutrition:  Health Habits/Nutrition  Do you exercise for at least 20 minutes 2-3 times per week?: (!) No  Have you lost any weight without trying in the past 3 months?: No  Do you eat only one meal per day?: No  Have you seen the dentist within the past year?: Yes  Body mass index: (!) 47  Health Habits/Nutrition Interventions:  · Recommend walking 20 minutes 3 times a week. · Continue to work on wt loss / exercise. Safety:  Safety  Do you have working smoke detectors?: Yes  Have all throw rugs been removed or fastened?: Yes  Do you have non-slip mats or surfaces in all bathtubs/showers?: (!) No  Do all of your stairways have a railing or banister?: Yes  Are your doorways, halls and stairs free of clutter?: Yes  Do you always fasten your seatbelt when you are in a car?: Yes  Safety Interventions:  · Discussed placing anti slip mats in the shower.        Personalized Preventive Plan   Current Health Maintenance Status  Immunization History   Administered Date(s) Administered    Tdap (Boostrix, Adacel) 05/28/2015        Health Maintenance   Topic Date Due    Hepatitis C screen  Never done    COVID-19 Vaccine (1) Never done    Cervical cancer screen  05/04/2020    Shingles Vaccine (1 of 2) Never done    Flu vaccine (1) Never done   [employer] Visit (AWV)  10/21/2021    Lipid screen  09/10/2022    Potassium monitoring  09/10/2022    Creatinine monitoring  09/10/2022    Breast cancer screen  04/08/2023    Colon cancer screen fecal DNA test (Cologuard)  11/04/2023    DTaP/Tdap/Td vaccine (2 - Td or Tdap) 05/28/2025    HIV screen  Completed  Hepatitis A vaccine  Aged Out    Hepatitis B vaccine  Aged Out    Hib vaccine  Aged Out    Meningococcal (ACWY) vaccine  Aged Out    Pneumococcal 0-64 years Vaccine  Aged Out     Recommendations for [place] Due: see orders and patient instructions/AVS.  . Recommended screening schedule for the next 5-10 years is provided to the patient in written form: see Patient Instructions/AVS.    Myles Frost was seen today for medicare awv. Diagnoses and all orders for this visit:    Routine general medical examination at a health care facility  See above concerns and discussion              Advance Care Planning     Advance Care Planning (ACP) Physician/NP/PA Conversation    Date of Conversation: 10/21/2021  Conducted with: Patient with [address]:  No healthcare decision makers have been documented. Click here to complete [address] including selection of the Healthcare Decision Maker Relationship (ie \"Primary\")     Today we documented Decision Maker(s) consistent with Legal Next of Kin hierarchy. Care Preferences:    Hospitalization: \"If your health worsens and it becomes clear that your chance of recovery is unlikely, what would be your preference regarding hospitalization? \"  The patient would prefer hospitalization. Ventilation: \"If you were unable to breath on your own and your chance of recovery was unlikely, what would be your preference about the use of a ventilator (breathing machine) if it was available to you? \"  The patient would desire the use of a ventilator. Resuscitation: \"In the event your heart stopped as a result of an underlying serious health condition, would you want attempts made to restart your heart, or would you prefer a natural death? \"  Yes, attempt to resuscitate.     benefit/burden of treatment options    Conversation Outcomes / Follow-Up Plan:  ACP complete - no further action today  Reviewed DNR/DNI and patient elects Full Code (Attempt Resuscitation)    Length of Voluntary ACP Conversation in minutes:  <16 minutes (Non-Billable)    MD Seamus Grissomaraseli Mcdonnell (:  1970) is a 46 y.o. female,Established patient, here for evaluation of the following chief complaint(s):  Medicare AWV         ASSESSMENT/PLAN:  1. Routine general medical examination at a health care facility  2. Acute bacterial sinusitis  -     cephALEXin (KEFLEX) 500 MG capsule; Take 1 capsule by mouth 3 times daily for 10 days, Disp-30 capsule, R-0Normal    Plan:  1. Routine general medical examination at a health care facility      2. Acute bacterial sinusitis  Take Keflex 500 mg three times a day x 10 days. - cephALEXin (KEFLEX) 500 MG capsule; Take 1 capsule by mouth 3 times daily for 10 days  Dispense: 30 capsule; Refill: 0        Return for Medicare Annual Wellness Visit in 1 year. Subjective   SUBJECTIVE/OBJECTIVE:  Castro Dias presents to the office with the complaints of congestion, cough, fever and sinus pressure. These symptoms have been present for 2 week. The symptoms are are worsening. Over the counter medications have been attempted (Maura Bang) without improvement. Review of Systems   Constitutional: Negative. HENT: Positive for congestion and sinus pressure. Negative for ear pain, rhinorrhea, sneezing and sore throat. Eyes: Negative for visual disturbance. Respiratory: Positive for cough. Negative for chest tightness and shortness of breath. Cardiovascular: Negative for chest pain and palpitations. Gastrointestinal: Negative for abdominal pain, blood in stool, constipation, diarrhea and nausea. Genitourinary: Negative for difficulty urinating, dysuria, frequency, menstrual problem and urgency. Musculoskeletal: Negative for arthralgias, joint swelling, myalgias and neck pain. Skin: Negative. Neurological: Negative for syncope. Psychiatric/Behavioral: Negative. Objective   Physical Exam  Constitutional:       Appearance: She is well-developed. She is obese. HENT:      Head: Atraumatic. Right Ear: Tympanic membrane normal.      Left Ear: Tympanic membrane normal.      Nose: Congestion present. Comments: Nasal mucosa red and swollen. Pain over the frontal sinus with palpation. Eyes:      Conjunctiva/sclera: Conjunctivae normal.   Cardiovascular:      Rate and Rhythm: Normal rate and regular rhythm. Heart sounds: Normal heart sounds. Pulmonary:      Effort: Pulmonary effort is normal.      Breath sounds: Normal breath sounds. Abdominal:      Palpations: Abdomen is soft. Tenderness: There is no abdominal tenderness. Musculoskeletal:         General: Normal range of motion. Cervical back: Normal range of motion and neck supple. Lymphadenopathy:      Cervical: No cervical adenopathy. Skin:     Findings: No rash. Neurological:      Mental Status: She is alert. Psychiatric:         Behavior: Behavior normal.         Thought Content: Thought content normal.                  An electronic signature was used to authenticate this note.     --Tc Hess MD

## 2021-12-07 DIAGNOSIS — E78.2 MIXED HYPERCHOLESTEROLEMIA AND HYPERTRIGLYCERIDEMIA: ICD-10-CM

## 2021-12-08 RX ORDER — ATORVASTATIN CALCIUM 40 MG/1
TABLET, FILM COATED ORAL
Qty: 30 TABLET | Refills: 5 | Status: SHIPPED | OUTPATIENT
Start: 2021-12-08 | End: 2022-03-14

## 2021-12-08 NOTE — TELEPHONE ENCOUNTER
reflux disease without esophagitis     Urethral stenosis     Major depressive disorder with single episode, in full remission ([facility] [address]     Class 3 severe obesity without serious comorbidity in adult [facility])     Hyperglycemia     Essential hypertension

## 2022-01-30 DIAGNOSIS — I10 ESSENTIAL HYPERTENSION: ICD-10-CM

## 2022-01-30 DIAGNOSIS — R60.0 BILATERAL EDEMA OF LOWER EXTREMITY: ICD-10-CM

## 2022-01-31 RX ORDER — HYDROCHLOROTHIAZIDE 25 MG/1
TABLET ORAL
Qty: 30 TABLET | Refills: 5 | Status: SHIPPED | OUTPATIENT
Start: 2022-01-31 | End: 2022-08-04

## 2022-01-31 NOTE — TELEPHONE ENCOUNTER
Health Maintenance   Topic Date Due    Hepatitis C screen  Never done    COVID-19 Vaccine (1) Never done    Cervical cancer screen  05/04/2020    Shingles Vaccine (1 of 2) Never done    Flu vaccine (1) Never done    Annual Wellness Visit (AWV)  10/21/2021    Lipid screen  09/10/2022    Potassium monitoring  09/10/2022    Creatinine monitoring  09/10/2022    Depression Monitoring  10/21/2022    Breast cancer screen  04/08/2023    Colon cancer screen fecal DNA test (Cologuard)  11/04/2023    DTaP/Tdap/Td vaccine (2 - Td or Tdap) 05/28/2025    HIV screen  Completed    Hepatitis A vaccine  Aged Out    Hepatitis B vaccine  Aged Out    Hib vaccine  Aged Out    Meningococcal (ACWY) vaccine  Aged Out    Pneumococcal 0-64 years Vaccine  Aged Out             (applicable per patient's age: Cancer Screenings, Depression Screening, Fall Risk Screening, Immunizations)    Hemoglobin A1C (%)   Date Value   12/22/2020 5.5   02/03/2020 5.4     LDL Cholesterol (mg/dL)   Date Value   09/10/2021 107     AST (U/L)   Date Value   09/10/2021 18     ALT (U/L)   Date Value   09/10/2021 13     BUN (mg/dL)   Date Value   09/10/2021 8      (goal A1C is < 7)   (goal LDL is <100) need 30-50% reduction from baseline     BP Readings from Last 3 Encounters:   10/21/21 134/88   10/14/21 111/69   09/14/21 139/74    (goal /80)      All Future Testing planned in [facility]:  Lab Frequency Next Occurrence   Comprehensive Metabolic Panel Once 19/58/9593   Hemoglobin A1C Once 03/14/2022   Lipid Panel Once 03/14/2022       Next Visit Date:  Future Appointments   Date Time Provider Sheldon Berry   3/14/2022  2:30 PM Vale Osman MD [facility] [address] [facility]   10/25/2022  1:30 PM [facility] [address] [facility]            Patient Active Problem List:     Mixed hypercholesterolemia and hypertriglyceridemia     Anxiety     Depression     Urinary urgency     Hiatal hernia     Symptomatic cholelithiasis     Renal

## 2022-03-11 ENCOUNTER — HOSPITAL ENCOUNTER (OUTPATIENT)
Age: 52
Discharge: HOME | End: 2022-03-11
Payer: MEDICARE

## 2022-03-11 DIAGNOSIS — R73.9 HYPERGLYCEMIA: ICD-10-CM

## 2022-03-11 DIAGNOSIS — E78.2 MIXED HYPERCHOLESTEROLEMIA AND HYPERTRIGLYCERIDEMIA: ICD-10-CM

## 2022-03-11 LAB
ALBUMIN SERPL-MCNC: 3.7 G/DL (ref 3.5–5.2)
ALP BLD-CCNC: 98 U/L (ref 35–104)
ALT SERPL-CCNC: 14 U/L (ref 5–33)
ANION GAP SERPL CALCULATED.3IONS-SCNC: 12 MMOL/L (ref 9–17)
AST SERPL-CCNC: 23 U/L
BILIRUB SERPL-MCNC: 0.37 MG/DL (ref 0.3–1.2)
BUN BLDV-MCNC: 8 MG/DL (ref 6–20)
BUN/CREAT BLD: 13 (ref 9–20)
CALCIUM SERPL-MCNC: 9.1 MG/DL (ref 8.6–10.4)
CHLORIDE BLD-SCNC: 102 MMOL/L (ref 98–107)
CHOLESTEROL/HDL RATIO: 3.4
CHOLESTEROL: 199 MG/DL
CO2: 24 MMOL/L (ref 20–31)
CREAT SERPL-MCNC: 0.61 MG/DL (ref 0.5–0.9)
GFR AFRICAN AMERICAN: >60 ML/MIN
GFR NON-AFRICAN AMERICAN: >60 ML/MIN
GFR SERPL CREATININE-BSD FRML MDRD: NORMAL ML/MIN/{1.73_M2}
GLUCOSE BLD-MCNC: 97 MG/DL (ref 70–99)
HDLC SERPL-MCNC: 59 MG/DL
LDL CHOLESTEROL: 117 MG/DL (ref 0–130)
PATIENT FASTING?: YES
POTASSIUM SERPL-SCNC: 4.1 MMOL/L (ref 3.7–5.3)
SODIUM BLD-SCNC: 138 MMOL/L (ref 135–144)
TOTAL PROTEIN: 7.4 G/DL (ref 6.4–8.3)
TRIGL SERPL-MCNC: 114 MG/DL

## 2022-03-11 PROCEDURE — 83036 HEMOGLOBIN GLYCOSYLATED A1C: CPT

## 2022-03-11 PROCEDURE — 80053 COMPREHEN METABOLIC PANEL: CPT

## 2022-03-11 PROCEDURE — 36415 COLL VENOUS BLD VENIPUNCTURE: CPT

## 2022-03-11 PROCEDURE — 80061 LIPID PANEL: CPT

## 2022-03-12 LAB
ESTIMATED AVERAGE GLUCOSE: 114 MG/DL
HBA1C MFR BLD: 5.6 % (ref 4–6)

## 2022-03-14 ENCOUNTER — OFFICE VISIT (OUTPATIENT)
Dept: FAMILY MEDICINE CLINIC | Age: 52
End: 2022-03-14
Payer: MEDICARE

## 2022-03-14 VITALS
BODY MASS INDEX: 47.11 KG/M2 | DIASTOLIC BLOOD PRESSURE: 78 MMHG | HEIGHT: 62 IN | WEIGHT: 256 LBS | HEART RATE: 72 BPM | SYSTOLIC BLOOD PRESSURE: 138 MMHG

## 2022-03-14 DIAGNOSIS — F32.5 MAJOR DEPRESSIVE DISORDER WITH SINGLE EPISODE, IN FULL REMISSION (HCC): ICD-10-CM

## 2022-03-14 DIAGNOSIS — R73.9 HYPERGLYCEMIA: ICD-10-CM

## 2022-03-14 DIAGNOSIS — E66.01 CLASS 3 SEVERE OBESITY DUE TO EXCESS CALORIES WITHOUT SERIOUS COMORBIDITY WITH BODY MASS INDEX (BMI) OF 45.0 TO 49.9 IN ADULT (HCC): ICD-10-CM

## 2022-03-14 DIAGNOSIS — I10 ESSENTIAL HYPERTENSION: ICD-10-CM

## 2022-03-14 DIAGNOSIS — E78.2 MIXED HYPERCHOLESTEROLEMIA AND HYPERTRIGLYCERIDEMIA: Primary | ICD-10-CM

## 2022-03-14 DIAGNOSIS — J45.998 SEASONAL ASTHMA: ICD-10-CM

## 2022-03-14 PROCEDURE — 3017F COLORECTAL CA SCREEN DOC REV: CPT | Performed by: INTERNAL MEDICINE

## 2022-03-14 PROCEDURE — G8417 CALC BMI ABV UP PARAM F/U: HCPCS | Performed by: INTERNAL MEDICINE

## 2022-03-14 PROCEDURE — G8427 DOCREV CUR MEDS BY ELIG CLIN: HCPCS | Performed by: INTERNAL MEDICINE

## 2022-03-14 PROCEDURE — 99214 OFFICE O/P EST MOD 30 MIN: CPT | Performed by: INTERNAL MEDICINE

## 2022-03-14 PROCEDURE — 1036F TOBACCO NON-USER: CPT | Performed by: INTERNAL MEDICINE

## 2022-03-14 PROCEDURE — G8484 FLU IMMUNIZE NO ADMIN: HCPCS | Performed by: INTERNAL MEDICINE

## 2022-03-14 RX ORDER — ALBUTEROL SULFATE 90 UG/1
2 AEROSOL, METERED RESPIRATORY (INHALATION) EVERY 4 HOURS PRN
Qty: 1 EACH | Refills: 3 | Status: SHIPPED | OUTPATIENT
Start: 2022-03-14 | End: 2022-04-13

## 2022-03-14 RX ORDER — ROSUVASTATIN CALCIUM 20 MG/1
20 TABLET, COATED ORAL NIGHTLY
Qty: 30 TABLET | Refills: 5 | Status: SHIPPED | OUTPATIENT
Start: 2022-03-14 | End: 2022-09-02 | Stop reason: SDUPTHER

## 2022-03-14 ASSESSMENT — ENCOUNTER SYMPTOMS
ABDOMINAL PAIN: 0
RHINORRHEA: 0
DIARRHEA: 0
BLOOD IN STOOL: 0
SHORTNESS OF BREATH: 0
COUGH: 0
CHEST TIGHTNESS: 0
CONSTIPATION: 0
SORE THROAT: 0
NAUSEA: 0

## 2022-03-14 NOTE — PROGRESS NOTES
Heather Rosales (:  1970) is a 46 y.o. female,Established patient, here for evaluation of the following chief complaint(s):  Discuss Labs, Hypertension, Hyperlipidemia, Gastroesophageal Reflux, and Mental Health Problem (follows with psych)         ASSESSMENT/PLAN:  1. Mixed hypercholesterolemia and hypertriglyceridemia  -     rosuvastatin (CRESTOR) 20 MG tablet; Take 1 tablet by mouth nightly, Disp-30 tablet, R-5Normal  -     Comprehensive Metabolic Panel; Future  -     Lipid Panel; Future  2. Major depressive disorder with single episode, in full remission ([facility]  3. Hyperglycemia  -     Comprehensive Metabolic Panel; Future  4. Essential hypertension  -     Comprehensive Metabolic Panel; Future  5. Class 3 severe obesity due to excess calories without serious comorbidity with body mass index (BMI) of 45.0 to 49.9 in adult ([facility]  6. Seasonal asthma  -     albuterol sulfate  (90 Base) MCG/ACT inhaler; Inhale 2 puffs into the lungs every 4 hours as needed for Wheezing or Shortness of Breath (Space out to every 6 hours as symptoms improve), Disp-1 each, R-3Normal      Plan:  1. Major depressive disorder with single episode, in full remission ([facility]  Fluctuates on the current medication. Continues to follow with Psychiatry. 2. Mixed hypercholesterolemia and hypertriglyceridemia  Change to Crestor 20 mg daily since Lipitor is not controlled on Lipitor. Obtain labs approximately one week prior to the office appointment. Please fast for 12 hours prior to obtaining the labs. Water or black coffee (no cream or sugar) is allowed prior to the the labs. - rosuvastatin (CRESTOR) 20 MG tablet; Take 1 tablet by mouth nightly  Dispense: 30 tablet; Refill: 5  - Comprehensive Metabolic Panel; Future  - Lipid Panel; Future    3. Hyperglycemia  HgbA1C continues to be normal.  Will continue to monitor yearly. - Comprehensive Metabolic Panel; Future    4. Essential hypertension  Controlled on HCTZ.   No UNEMPLOYED    Tobacco Use      Smoking status: Never Smoker      Smokeless tobacco: Never Used    Substance and Sexual Activity      Alcohol use: No        Comment: occassional      Drug use: No      Sexual activity: Yes        Partners: Male    Other Topics      Concerns:        Not on file    Social History Narrative      Not on file    Social Determinants of Health  Financial Resource Strain: Low Risk       Difficulty of Paying Living Expenses: Not very hard  Food Insecurity: No Food Insecurity      Worried About Running Out of Food in the Last Year: Never true      Ran Out of Food in the Last Year: Never true  Transportation Needs:       Lack of Transportation (Medical): Not on file      Lack of Transportation (Non-Medical):  Not on file  Physical Activity:       Days of Exercise per Week: Not on file      Minutes of Exercise per Session: Not on file  Stress:       Feeling of Stress : Not on file  Social Connections:       Frequency of Communication with Friends and Family: Not on file      Frequency of Social Gatherings with Friends and Family: Not on file      Attends Taoism Services: Not on file      Active Member of [address] [place] [address] or Organizations: Not on file      Attends Club or Organization Meetings: Not on file      Marital Status: Not on file  Intimate Partner Violence:       Fear of Current or Ex-Partner: Not on file      Emotionally Abused: Not on file      Physically Abused: Not on file      Sexually Abused: Not on file  Housing Stability:       Unable to Pay for Housing in the Last Year: Not on file      Number of Places Lived in the Last Year: Not on file      Unstable Housing in the Last Year: Not on file    Review of patient's family history indicates:  Problem: Diabetes      Relation: Mother          Age of Onset: (Not Specified)  Problem: Cancer      Relation: Mother          Age of Onset: (Not Specified)  Problem: Depression      Relation: Father          Age of Onset: (Not Specified)  Problem: High Blood Pressure      Relation: Father          Age of Onset: (Not Specified)      Current Outpatient Medications on File Prior to Visit:  hydroCHLOROthiazide (HYDRODIURIL) 25 MG tablet, TAKE 1 TABLET BY MOUTH EVERY DAY, Disp: 30 tablet, Rfl: 5  atorvastatin (LIPITOR) 40 MG tablet, TAKE 1 TABLET BY MOUTH EVERY DAY, Disp: 30 tablet, Rfl: 5  pantoprazole (PROTONIX) 40 MG tablet, TAKE 1 TABLET BY MOUTH TWICE DAILY, Disp: 60 tablet, Rfl: 5  potassium chloride (KLOR-CON M) 20 MEQ extended release tablet, TAKE 1 TABLET BY MOUTH EVERY DAY, Disp: 30 tablet, Rfl: 5  albuterol sulfate  (90 Base) MCG/ACT inhaler, Inhale 2 puffs into the lungs every 4 hours as needed for Wheezing or Shortness of Breath (Space out to every 6 hours as symptoms improve), Disp: 1 Inhaler, Rfl: 0  sertraline (ZOLOFT) 100 MG tablet, TAKE 2 TABLETS BY MOUTH EVERY DAY, Disp: 30 tablet, Rfl: 5  ALPRAZolam (XANAX) 0.5 MG tablet, TAKE 1 TABLET BY MOUTH TWICE DAILY AS NEEDED, Disp: , Rfl: 2  norethindrone-ethinyl estradiol (ORTHO-NOVUM 7/7/7, 28,) 0.5/0.75/1-35 MG-MCG per tablet, Take 1 tablet by mouth daily. , Disp: 1 packet, Rfl: 3  (DISCONTINUED) hydroCHLOROthiazide (HYDRODIURIL) 25 MG tablet, TAKE 1 TABLET BY MOUTH EVERY DAY, Disp: 30 tablet, Rfl: 5  ondansetron (ZOFRAN) 4 MG tablet, TAKE 1 TABLET BY MOUTH EVERY 8 HOURS AS NEEDED FOR NAUSEA / vomiting, Disp: 40 tablet, Rfl: 1  Blood Pressure Monitor KIT, Monitor BP daily. , Disp: 1 kit, Rfl: 0    No current facility-administered medications on file prior to visit.        -- Medrol (Methylprednisolone) -- Itching   -- Other -- Swelling    --  EGG PLANT   -- Pcn (Penicillins) -- Rash      Lab Results       Component                Value               Date                       NA                       138                 03/11/2022                 K                        4.1                 03/11/2022                 CL                       102                 03/11/2022                 CO2 24                  03/11/2022                 BUN                      8                   03/11/2022                 CREATININE               0.61                03/11/2022                 GLUCOSE                  97                  03/11/2022                 CALCIUM                  9.1                 03/11/2022                 PROT                     7.4                 03/11/2022                 LABALBU                  3.7                 03/11/2022                 BILITOT                  0.37                03/11/2022                 ALKPHOS                  98                  03/11/2022                 AST                      23                  03/11/2022                 ALT                      14                  03/11/2022                 LABGLOM                  >60                 03/11/2022                 GFRAA                    >60                 03/11/2022              Lab Results       Component                Value               Date                       LABA1C                   5.6                 03/11/2022            Lab Results       Component                Value               Date                       EAG                      114                 03/11/2022              Lab Results       Component                Value               Date                       CHOL                     199                 03/11/2022                 CHOL                     194                 09/10/2021                 CHOL                     201 (H)             12/22/2020            Lab Results       Component                Value               Date                       TRIG                     114                 03/11/2022                 TRIG                     121                 09/10/2021                 TRIG                     115                 12/22/2020            Lab Results       Component                Value               Date                       HDL 59                  03/11/2022                 HDL                      63                  09/10/2021                 HDL                      68                  12/22/2020            Lab Results       Component                Value               Date                       LDLCHOLESTEROL           117                 03/11/2022                 LDLCHOLESTEROL           107                 09/10/2021                 LDLCHOLESTEROL           110                 12/22/2020            Lab Results       Component                Value               Date                       VLDL                     NOT REPORTED        09/10/2021                 VLDL                     NOT REPORTED        12/22/2020                 VLDL                     NOT REPORTED        02/03/2020            Lab Results       Component                Value               Date                       CHOLHDLRATIO             3.4                 03/11/2022                 CHOLHDLRATIO             3.1                 09/10/2021                 CHOLHDLRATIO             3.0                 12/22/2020                              Hypertension  This is a chronic problem. The current episode started more than 1 year ago. The problem is unchanged. The problem is controlled. Pertinent negatives include no chest pain, neck pain, palpitations or shortness of breath. Past treatments include diuretics. The current treatment provides significant improvement. There are no compliance problems. There is no history of angina. Hyperlipidemia  This is a chronic problem. The current episode started more than 1 year ago. The problem is controlled. Recent lipid tests were reviewed and are normal. Pertinent negatives include no chest pain, myalgias or shortness of breath. Current antihyperlipidemic treatment includes statins. The current treatment provides significant improvement of lipids. There are no compliance problems.     Gastroesophageal Reflux  She reports no abdominal pain, no chest pain, no coughing, no nausea or no sore throat. This is a chronic problem. The current episode started more than 1 year ago. The problem occurs rarely. The problem has been unchanged. She has tried a PPI for the symptoms. The treatment provided significant relief. Review of Systems   Constitutional: Negative. HENT: Negative for congestion, ear pain, rhinorrhea, sneezing and sore throat. Eyes: Negative for visual disturbance. Respiratory: Negative for cough, chest tightness and shortness of breath. Cardiovascular: Negative for chest pain and palpitations. Gastrointestinal: Negative for abdominal pain, blood in stool, constipation, diarrhea and nausea. Genitourinary: Negative for difficulty urinating, dysuria, frequency, menstrual problem and urgency. Musculoskeletal: Negative for arthralgias, joint swelling, myalgias and neck pain. Skin: Negative. Neurological: Negative for syncope. Psychiatric/Behavioral: Negative. Objective   Physical Exam  Constitutional:       Appearance: She is well-developed. HENT:      Head: Atraumatic. Eyes:      Conjunctiva/sclera: Conjunctivae normal.   Cardiovascular:      Rate and Rhythm: Normal rate and regular rhythm. Heart sounds: Normal heart sounds. Pulmonary:      Effort: Pulmonary effort is normal.      Breath sounds: Normal breath sounds. Abdominal:      Palpations: Abdomen is soft. Tenderness: There is no abdominal tenderness. Musculoskeletal:         General: Normal range of motion. Cervical back: Normal range of motion and neck supple. Lymphadenopathy:      Cervical: No cervical adenopathy. Skin:     Findings: No rash. Neurological:      Mental Status: She is alert. Psychiatric:         Behavior: Behavior normal.         Thought Content: Thought content normal.                  An electronic signature was used to authenticate this note.     --Jyoti Card MD

## 2022-03-14 NOTE — PATIENT INSTRUCTIONS
Survey: You may be receiving a survey from [employer] regarding your visit today. You may get this in the mail, through your MyChart or in your email. Please complete the survey to enable us to provide the highest quality of care to you and your family. Please also, mention our names. If you cannot score us as very good (5 Stars) on any question, please feel free to call the office to discuss how we could have made your experience exceptional.      Thank You! MD Naida James, JOSE Chávez, Jeremías Sanches, ALESIAN RN    [place], [place]      1. Major depressive disorder with single episode, in full remission ([address]  Fluctuates on the current medication. Continues to follow with Psychiatry. 2. Mixed hypercholesterolemia and hypertriglyceridemia  Change to Crestor 20 mg daily since Lipitor is not controlled on Lipitor. Obtain labs approximately one week prior to the office appointment. Please fast for 12 hours prior to obtaining the labs. Water or black coffee (no cream or sugar) is allowed prior to the the labs. - rosuvastatin (CRESTOR) 20 MG tablet; Take 1 tablet by mouth nightly  Dispense: 30 tablet; Refill: 5  - Comprehensive Metabolic Panel; Future  - Lipid Panel; Future    3. Hyperglycemia  HgbA1C continues to be normal.  Will continue to monitor yearly. - Comprehensive Metabolic Panel; Future    4. Essential hypertension  Controlled on HCTZ. No change in medication.  - Comprehensive Metabolic Panel; Future    5. Class 3 severe obesity due to excess calories without serious comorbidity with body mass index (BMI) of 45.0 to 49.9 in adult [facility])  Continue to work on wt loss / exercise. 6. Seasonal asthma  Continue on PRN use of Albuterol.    - albuterol sulfate  (90 Base) MCG/ACT inhaler; Inhale 2 puffs into the lungs every 4 hours as needed for Wheezing or Shortness of Breath (Space out to every 6 hours as symptoms improve)  Dispense: 1 each;  Refill: 3    Unknown Richar was

## 2022-03-31 DIAGNOSIS — R10.13 EPIGASTRIC ABDOMINAL PAIN: ICD-10-CM

## 2022-03-31 DIAGNOSIS — I10 ESSENTIAL HYPERTENSION: ICD-10-CM

## 2022-03-31 DIAGNOSIS — K21.9 GASTROESOPHAGEAL REFLUX DISEASE WITHOUT ESOPHAGITIS: ICD-10-CM

## 2022-04-01 RX ORDER — POTASSIUM CHLORIDE 20 MEQ/1
TABLET, EXTENDED RELEASE ORAL
Qty: 30 TABLET | Refills: 5 | Status: SHIPPED | OUTPATIENT
Start: 2022-04-01 | End: 2022-10-13 | Stop reason: SDUPTHER

## 2022-04-01 RX ORDER — PANTOPRAZOLE SODIUM 40 MG/1
TABLET, DELAYED RELEASE ORAL
Qty: 60 TABLET | Refills: 5 | Status: SHIPPED | OUTPATIENT
Start: 2022-04-01 | End: 2022-10-13 | Stop reason: SDUPTHER

## 2022-04-01 NOTE — TELEPHONE ENCOUNTER
Health Maintenance   Topic Date Due    Hepatitis C screen  Never done    COVID-19 Vaccine (1) Never done    Pneumococcal 0-64 years Vaccine (1 of 2 - PPSV23) Never done    Cervical cancer screen  05/04/2020    Shingles Vaccine (1 of 2) Never done    Annual Wellness Visit (AWV)  10/21/2021    Flu vaccine (Season Ended) 09/01/2022    Depression Monitoring  10/21/2022    Lipid screen  03/11/2023    Potassium monitoring  03/11/2023    Creatinine monitoring  03/11/2023    Breast cancer screen  04/08/2023    Colorectal Cancer Screen  11/04/2023    DTaP/Tdap/Td vaccine (2 - Td or Tdap) 05/28/2025    HIV screen  Completed    Hepatitis A vaccine  Aged Out    Hepatitis B vaccine  Aged Out    Hib vaccine  Aged Out    Meningococcal (ACWY) vaccine  Aged Out             (applicable per patient's age: Cancer Screenings, Depression Screening, Fall Risk Screening, Immunizations)    Hemoglobin A1C (%)   Date Value   03/11/2022 5.6   12/22/2020 5.5   02/03/2020 5.4     LDL Cholesterol (mg/dL)   Date Value   03/11/2022 117     AST (U/L)   Date Value   03/11/2022 23     ALT (U/L)   Date Value   03/11/2022 14     BUN (mg/dL)   Date Value   03/11/2022 8      (goal A1C is < 7)   (goal LDL is <100) need 30-50% reduction from baseline     BP Readings from Last 3 Encounters:   03/14/22 138/78   10/21/21 134/88   10/14/21 111/69    (goal /80)      All Future Testing planned in [facility]:  Lab Frequency Next Occurrence   Comprehensive Metabolic Panel Once 23/66/0705   Lipid Panel Once 09/14/2022       Next Visit Date:  Future Appointments   Date Time Provider Sheldon Berry   9/15/2022  2:00 PM MD Kim Pulido   10/25/2022  1:30 PM ANALI BERG, [address] [facility]            Patient Active Problem List:     Mixed hypercholesterolemia and hypertriglyceridemia     Anxiety     Depression     Urinary urgency     Hiatal hernia     Symptomatic cholelithiasis     Renal colic on right side     Gastroesophageal reflux disease without esophagitis     Urethral stenosis     Major depressive disorder with single episode, in full remission ([facility]     Class 3 severe obesity without serious comorbidity in adult [facility])     Hyperglycemia     Essential hypertension

## 2022-08-02 DIAGNOSIS — I10 ESSENTIAL HYPERTENSION: ICD-10-CM

## 2022-08-02 DIAGNOSIS — R60.0 BILATERAL EDEMA OF LOWER EXTREMITY: ICD-10-CM

## 2022-08-04 RX ORDER — HYDROCHLOROTHIAZIDE 25 MG/1
TABLET ORAL
Qty: 30 TABLET | Refills: 5 | Status: SHIPPED | OUTPATIENT
Start: 2022-08-04

## 2022-09-02 DIAGNOSIS — E78.2 MIXED HYPERCHOLESTEROLEMIA AND HYPERTRIGLYCERIDEMIA: ICD-10-CM

## 2022-09-02 RX ORDER — ROSUVASTATIN CALCIUM 20 MG/1
20 TABLET, COATED ORAL NIGHTLY
Qty: 30 TABLET | Refills: 5 | Status: SHIPPED | OUTPATIENT
Start: 2022-09-02

## 2022-09-02 NOTE — TELEPHONE ENCOUNTER
Health Maintenance   Topic Date Due    COVID-19 Vaccine (1) Never done    Pneumococcal 0-64 years Vaccine (1 - PCV) Never done    Hepatitis C screen  Never done    Shingles vaccine (1 of 2) Never done    Annual Wellness Visit (AWV)  10/21/2021    Flu vaccine (1) Never done    Depression Monitoring  10/21/2022    Lipids  03/11/2023    Breast cancer screen  04/08/2023    Colorectal Cancer Screen  01/21/2024    Cervical cancer screen  05/04/2025    DTaP/Tdap/Td vaccine (2 - Td or Tdap) 05/28/2025    HIV screen  Completed    Hepatitis A vaccine  Aged Out    Hepatitis B vaccine  Aged Out    Hib vaccine  Aged Out    Meningococcal (ACWY) vaccine  Aged Out             (applicable per patient's age: Cancer Screenings, Depression Screening, Fall Risk Screening, Immunizations)    Hemoglobin A1C (%)   Date Value   03/11/2022 5.6   12/22/2020 5.5   02/03/2020 5.4     LDL Cholesterol (mg/dL)   Date Value   03/11/2022 117     AST (U/L)   Date Value   03/11/2022 23     ALT (U/L)   Date Value   03/11/2022 14     BUN (mg/dL)   Date Value   03/11/2022 8      (goal A1C is < 7)   (goal LDL is <100) need 30-50% reduction from baseline     BP Readings from Last 3 Encounters:   03/14/22 138/78   10/21/21 134/88   10/14/21 111/69    (goal /80)      All Future Testing planned in [facility]:  Lab Frequency Next Occurrence   Comprehensive Metabolic Panel Once 24/07/7906   Lipid Panel Once 09/14/2022       Next Visit Date:  Future Appointments   Date Time Provider Sheldon Berry   9/15/2022  2:00 PM MD Kim Goldsmith   10/25/2022  1:30 PM ANALI BERG, [address] [facility]            Patient Active Problem List:     Mixed hypercholesterolemia and hypertriglyceridemia     Anxiety     Depression     Urinary urgency     Hiatal hernia     Symptomatic cholelithiasis     Renal colic on right side     Gastroesophageal reflux disease without esophagitis     Urethral stenosis     Major depressive disorder

## 2022-09-12 ENCOUNTER — TELEPHONE (OUTPATIENT)
Dept: FAMILY MEDICINE CLINIC | Age: 52
End: 2022-09-12

## 2022-09-12 DIAGNOSIS — B96.89 ACUTE BACTERIAL SINUSITIS: Primary | ICD-10-CM

## 2022-09-12 DIAGNOSIS — J01.90 ACUTE BACTERIAL SINUSITIS: Primary | ICD-10-CM

## 2022-09-12 RX ORDER — AZITHROMYCIN 250 MG/1
TABLET, FILM COATED ORAL
Qty: 1 PACKET | Refills: 0 | Status: SHIPPED | OUTPATIENT
Start: 2022-09-12 | End: 2022-09-22

## 2022-09-12 NOTE — TELEPHONE ENCOUNTER
Pt is asking if you could call her in a Zpak. She has drainage, no cough, or fever. Pt has been using Tussin, benadryl, and Claritin with no relief.  MEERA

## 2022-10-10 ENCOUNTER — HOSPITAL ENCOUNTER (OUTPATIENT)
Age: 52
Discharge: HOME | End: 2022-10-10
Payer: MEDICARE

## 2022-10-10 DIAGNOSIS — R73.9 HYPERGLYCEMIA: ICD-10-CM

## 2022-10-10 DIAGNOSIS — I10 ESSENTIAL HYPERTENSION: ICD-10-CM

## 2022-10-10 DIAGNOSIS — E78.2 MIXED HYPERCHOLESTEROLEMIA AND HYPERTRIGLYCERIDEMIA: ICD-10-CM

## 2022-10-10 LAB
ALBUMIN SERPL-MCNC: 4 G/DL (ref 3.5–5.2)
ALP BLD-CCNC: 92 U/L (ref 35–104)
ALT SERPL-CCNC: 19 U/L (ref 5–33)
ANION GAP SERPL CALCULATED.3IONS-SCNC: 9 MMOL/L (ref 9–17)
AST SERPL-CCNC: 30 U/L
BILIRUB SERPL-MCNC: 0.5 MG/DL (ref 0.3–1.2)
BUN BLDV-MCNC: 9 MG/DL (ref 6–20)
BUN/CREAT BLD: 16 (ref 9–20)
CALCIUM SERPL-MCNC: 9.1 MG/DL (ref 8.6–10.4)
CHLORIDE BLD-SCNC: 106 MMOL/L (ref 98–107)
CO2: 27 MMOL/L (ref 20–31)
CREAT SERPL-MCNC: 0.58 MG/DL (ref 0.5–0.9)
GFR SERPL CREATININE-BSD FRML MDRD: >60 ML/MIN/1.73M2
GLUCOSE BLD-MCNC: 94 MG/DL (ref 70–99)
PATIENT FASTING?: YES
POTASSIUM SERPL-SCNC: 3.6 MMOL/L (ref 3.7–5.3)
SODIUM BLD-SCNC: 142 MMOL/L (ref 135–144)
TOTAL PROTEIN: 7.5 G/DL (ref 6.4–8.3)

## 2022-10-10 PROCEDURE — 80053 COMPREHEN METABOLIC PANEL: CPT

## 2022-10-10 PROCEDURE — 80061 LIPID PANEL: CPT

## 2022-10-10 PROCEDURE — 36415 COLL VENOUS BLD VENIPUNCTURE: CPT

## 2022-10-11 LAB
CHOLESTEROL/HDL RATIO: 3.7
CHOLESTEROL: 194 MG/DL
HDLC SERPL-MCNC: 53 MG/DL
LDL CHOLESTEROL: 123 MG/DL (ref 0–130)
TRIGL SERPL-MCNC: 88 MG/DL

## 2022-10-13 ENCOUNTER — OFFICE VISIT (OUTPATIENT)
Dept: FAMILY MEDICINE CLINIC | Age: 52
End: 2022-10-13
Payer: MEDICARE

## 2022-10-13 VITALS
WEIGHT: 244 LBS | SYSTOLIC BLOOD PRESSURE: 139 MMHG | BODY MASS INDEX: 44.9 KG/M2 | DIASTOLIC BLOOD PRESSURE: 80 MMHG | HEIGHT: 62 IN | HEART RATE: 86 BPM

## 2022-10-13 DIAGNOSIS — I10 ESSENTIAL HYPERTENSION: ICD-10-CM

## 2022-10-13 DIAGNOSIS — E66.01 CLASS 3 SEVERE OBESITY DUE TO EXCESS CALORIES WITHOUT SERIOUS COMORBIDITY WITH BODY MASS INDEX (BMI) OF 45.0 TO 49.9 IN ADULT (HCC): ICD-10-CM

## 2022-10-13 DIAGNOSIS — F41.8 DEPRESSION WITH ANXIETY: ICD-10-CM

## 2022-10-13 DIAGNOSIS — K21.9 GASTROESOPHAGEAL REFLUX DISEASE WITHOUT ESOPHAGITIS: ICD-10-CM

## 2022-10-13 DIAGNOSIS — E78.2 MIXED HYPERCHOLESTEROLEMIA AND HYPERTRIGLYCERIDEMIA: Primary | ICD-10-CM

## 2022-10-13 DIAGNOSIS — R10.13 EPIGASTRIC ABDOMINAL PAIN: ICD-10-CM

## 2022-10-13 PROCEDURE — 99214 OFFICE O/P EST MOD 30 MIN: CPT | Performed by: INTERNAL MEDICINE

## 2022-10-13 PROCEDURE — G8417 CALC BMI ABV UP PARAM F/U: HCPCS | Performed by: INTERNAL MEDICINE

## 2022-10-13 PROCEDURE — G8427 DOCREV CUR MEDS BY ELIG CLIN: HCPCS | Performed by: INTERNAL MEDICINE

## 2022-10-13 PROCEDURE — G8484 FLU IMMUNIZE NO ADMIN: HCPCS | Performed by: INTERNAL MEDICINE

## 2022-10-13 PROCEDURE — 1036F TOBACCO NON-USER: CPT | Performed by: INTERNAL MEDICINE

## 2022-10-13 PROCEDURE — 3017F COLORECTAL CA SCREEN DOC REV: CPT | Performed by: INTERNAL MEDICINE

## 2022-10-13 RX ORDER — NORETHINDRONE ACETATE AND ETHINYL ESTRADIOL 1; 5 MG/1; UG/1
TABLET ORAL
COMMUNITY
Start: 2022-08-01

## 2022-10-13 RX ORDER — PANTOPRAZOLE SODIUM 40 MG/1
40 TABLET, DELAYED RELEASE ORAL 2 TIMES DAILY
Qty: 60 TABLET | Refills: 5 | Status: SHIPPED | OUTPATIENT
Start: 2022-10-13

## 2022-10-13 RX ORDER — POTASSIUM CHLORIDE 20 MEQ/1
20 TABLET, EXTENDED RELEASE ORAL DAILY
Qty: 30 TABLET | Refills: 5 | Status: SHIPPED | OUTPATIENT
Start: 2022-10-13

## 2022-10-13 SDOH — ECONOMIC STABILITY: FOOD INSECURITY: WITHIN THE PAST 12 MONTHS, THE FOOD YOU BOUGHT JUST DIDN'T LAST AND YOU DIDN'T HAVE MONEY TO GET MORE.: NEVER TRUE

## 2022-10-13 SDOH — ECONOMIC STABILITY: FOOD INSECURITY: WITHIN THE PAST 12 MONTHS, YOU WORRIED THAT YOUR FOOD WOULD RUN OUT BEFORE YOU GOT MONEY TO BUY MORE.: NEVER TRUE

## 2022-10-13 ASSESSMENT — PATIENT HEALTH QUESTIONNAIRE - PHQ9
SUM OF ALL RESPONSES TO PHQ QUESTIONS 1-9: 0
4. FEELING TIRED OR HAVING LITTLE ENERGY: 0
2. FEELING DOWN, DEPRESSED OR HOPELESS: 0
9. THOUGHTS THAT YOU WOULD BE BETTER OFF DEAD, OR OF HURTING YOURSELF: 0
7. TROUBLE CONCENTRATING ON THINGS, SUCH AS READING THE NEWSPAPER OR WATCHING TELEVISION: 0
SUM OF ALL RESPONSES TO PHQ QUESTIONS 1-9: 0
SUM OF ALL RESPONSES TO PHQ9 QUESTIONS 1 & 2: 0
1. LITTLE INTEREST OR PLEASURE IN DOING THINGS: 0
3. TROUBLE FALLING OR STAYING ASLEEP: 0
8. MOVING OR SPEAKING SO SLOWLY THAT OTHER PEOPLE COULD HAVE NOTICED. OR THE OPPOSITE, BEING SO FIGETY OR RESTLESS THAT YOU HAVE BEEN MOVING AROUND A LOT MORE THAN USUAL: 0
6. FEELING BAD ABOUT YOURSELF - OR THAT YOU ARE A FAILURE OR HAVE LET YOURSELF OR YOUR FAMILY DOWN: 0
SUM OF ALL RESPONSES TO PHQ QUESTIONS 1-9: 0
5. POOR APPETITE OR OVEREATING: 0
SUM OF ALL RESPONSES TO PHQ QUESTIONS 1-9: 0

## 2022-10-13 ASSESSMENT — ENCOUNTER SYMPTOMS
DIARRHEA: 0
SORE THROAT: 0
BLOOD IN STOOL: 0
ABDOMINAL PAIN: 0
SHORTNESS OF BREATH: 0
COUGH: 0
NAUSEA: 0
RHINORRHEA: 0
CHEST TIGHTNESS: 0
CONSTIPATION: 0

## 2022-10-13 ASSESSMENT — SOCIAL DETERMINANTS OF HEALTH (SDOH): HOW HARD IS IT FOR YOU TO PAY FOR THE VERY BASICS LIKE FOOD, HOUSING, MEDICAL CARE, AND HEATING?: NOT VERY HARD

## 2022-10-13 NOTE — PATIENT INSTRUCTIONS
Survey: You may be receiving a survey from [employer] regarding your visit today. You may get this in the mail, through your MyChart or in your email. Please complete the survey to enable us to provide the highest quality of care to you and your family. Please also, mention our names. If you cannot score us as very good (5 Stars) on any question, please feel free to call the office to discuss how we could have made your experience exceptional.      Thank You! MD Fish Ellison, JOSE Chávez, Evert Cha, ALESIAN NEWTON Kincaid CMA     ASSESSMENT/PLAN:  1. Mixed hypercholesterolemia and hypertriglyceridemia  -     Comprehensive Metabolic Panel; Future  -     Lipid Panel; Future  2. Essential hypertension  -     potassium chloride (KLOR-CON M) 20 MEQ extended release tablet; Take 1 tablet by mouth daily, Disp-30 tablet, R-5We are requesting this refill to have on file for next month s order. Normal  -     Comprehensive Metabolic Panel; Future  3. Epigastric abdominal pain  -     pantoprazole (PROTONIX) 40 MG tablet; Take 1 tablet by mouth 2 times daily, Disp-60 tablet, R-5We are requesting this refill to have on file for next month s order. Normal  4. Gastroesophageal reflux disease without esophagitis  -     pantoprazole (PROTONIX) 40 MG tablet; Take 1 tablet by mouth 2 times daily, Disp-60 tablet, R-5We are requesting this refill to have on file for next month s order. Normal  5. Class 3 severe obesity due to excess calories without serious comorbidity with body mass index (BMI) of 45.0 to 49.9 in adult ([facility]  6. Depression with anxiety    Plan:  1. Essential hypertension  Controlled on HCTZ PRN. - potassium chloride (KLOR-CON M) 20 MEQ extended release tablet; Take 1 tablet by mouth daily  Dispense: 30 tablet; Refill: 5  - Comprehensive Metabolic Panel; Future    2. Epigastric abdominal pain  Controlled on Protonix. No change in medication.  - pantoprazole (PROTONIX) 40 MG tablet;  Take 1 tablet by mouth 2 times daily  Dispense: 60 tablet; Refill: 5    3. Gastroesophageal reflux disease without esophagitis  Controlled on Protonix. No change in medication.  - pantoprazole (PROTONIX) 40 MG tablet; Take 1 tablet by mouth 2 times daily  Dispense: 60 tablet; Refill: 5    4. Mixed hypercholesterolemia and hypertriglyceridemia  Controlled on Crestor. No change in medication. Obtain labs approximately one week prior to the office appointment. Please fast for 12 hours prior to obtaining the labs. Water or black coffee (no cream or sugar) is allowed prior to the the labs. - Comprehensive Metabolic Panel; Future  - Lipid Panel; Future    5. Class 3 severe obesity due to excess calories without serious comorbidity with body mass index (BMI) of 45.0 to 49.9 in [facility])  Continue to work on wt loss / exercise. 6. Depression with anxiety  Has been stable on the current medication. Follows with Dr. Sintia Lynne. Medardo Jalil was instructed to follow up in the clinic in 6 months for check up or as needed with any medical issues.

## 2022-10-13 NOTE — PROGRESS NOTES
Nyla Adame (:  1970) is a 46 y.o. female,Established patient, here for evaluation of the following chief complaint(s):  Hypertension (6 month check up. Discuss labs. Med refills.), Hyperlipidemia, and Mental Health Problem (Follows with Psych)         ASSESSMENT/PLAN:  1. Mixed hypercholesterolemia and hypertriglyceridemia  -     Comprehensive Metabolic Panel; Future  -     Lipid Panel; Future  2. Essential hypertension  -     potassium chloride (KLOR-CON M) 20 MEQ extended release tablet; Take 1 tablet by mouth daily, Disp-30 tablet, R-5We are requesting this refill to have on file for next month s order. Normal  -     Comprehensive Metabolic Panel; Future  3. Epigastric abdominal pain  -     pantoprazole (PROTONIX) 40 MG tablet; Take 1 tablet by mouth 2 times daily, Disp-60 tablet, R-5We are requesting this refill to have on file for next month s order. Normal  4. Gastroesophageal reflux disease without esophagitis  -     pantoprazole (PROTONIX) 40 MG tablet; Take 1 tablet by mouth 2 times daily, Disp-60 tablet, R-5We are requesting this refill to have on file for next month s order. Normal  5. Class 3 severe obesity due to excess calories without serious comorbidity with body mass index (BMI) of 45.0 to 49.9 in adult ([facility] [address]  6. Depression with anxiety    Plan:  1. Essential hypertension  Controlled on HCTZ PRN. - potassium chloride (KLOR-CON M) 20 MEQ extended release tablet; Take 1 tablet by mouth daily  Dispense: 30 tablet; Refill: 5  - Comprehensive Metabolic Panel; Future    2. Epigastric abdominal pain  Controlled on Protonix. No change in medication.  - pantoprazole (PROTONIX) 40 MG tablet; Take 1 tablet by mouth 2 times daily  Dispense: 60 tablet; Refill: 5    3. Gastroesophageal reflux disease without esophagitis  Controlled on Protonix. No change in medication.  - pantoprazole (PROTONIX) 40 MG tablet; Take 1 tablet by mouth 2 times daily  Dispense: 60 tablet; Refill: 5    4.  Mixed hypercholesterolemia and hypertriglyceridemia  Controlled on Crestor. No change in medication. Obtain labs approximately one week prior to the office appointment. Please fast for 12 hours prior to obtaining the labs. Water or black coffee (no cream or sugar) is allowed prior to the the labs. - Comprehensive Metabolic Panel; Future  - Lipid Panel; Future    5. Class 3 severe obesity due to excess calories without serious comorbidity with body mass index (BMI) of 45.0 to 49.9 in adult [facility])  Continue to work on wt loss / exercise. 6. Depression with anxiety  Has been stable on the current medication. Follows with Dr. Janki Jackson. Erendira Joe was instructed to follow up in the clinic in 6 months for check up or as needed with any medical issues. Subjective   SUBJECTIVE/OBJECTIVE:  Erendira Joe presents for a check up on her medical conditions HTN, Hyperlipemia, depression / anxiety. Erendira Joe denies new problems other than allergies. Medications were reviewed with Erendira Joe, she is  tolerating the medication. Bowels are regular. There has not been rectal bleeding. Erendira Joe denies urinary complications, the urine stream is good. Erendira Joe denies chest pain and denies increasing shortness of breath. Labs from 10/10/22 reviewed. Continues to follow with Dr. Janki Jackson. Depression / anxiety is doing well. Past Medical History:  No date:  Anxiety  No date: Asthma  5/15/2018: Class 3 obesity due to excess calories without serious   comorbidity with body mass index (BMI) of 45.0 to 49.9 in adult  No date: Crohn's disease ([facility] [address]  No date: Depression  No date: GERD (gastroesophageal reflux disease)  No date: Hyperlipidemia  No date: Hypertension  No date: Wears glasses    Past Surgical History:  No date:  SECTION  9/25/15: CHOLECYSTECTOMY, LAPAROSCOPIC  2015: CYSTOSCOPY  No date: MOUTH SURGERY    Social History    Socioeconomic History      Marital status:       Spouse name: Not on file Number of children: Not on file      Years of education: Not on file      Highest education level: Not on file    Occupational History        Employer: UNEMPLOYED    Tobacco Use      Smoking status: Never      Smokeless tobacco: Never    Substance and Sexual Activity      Alcohol use: No        Comment: occassional      Drug use: No      Sexual activity: Yes        Partners: Male    Other Topics      Concerns:        Not on file    Social History Narrative      Not on file    Social Determinants of Health  Financial Resource Strain: Low Risk       Difficulty of Paying Living Expenses: Not very hard  Food Insecurity: No Food Insecurity      Worried About Running Out of Food in the Last Year: Never true      Ran Out of Food in the Last Year: Never true  Transportation Needs: Not on file  Physical Activity: Not on file  Stress: Not on file  Social Connections: Not on file  Intimate Partner Violence: Not on file  Housing Stability: Not on file    Review of patient's family history indicates:  Problem: Diabetes      Relation: Mother          Age of Onset: (Not Specified)  Problem: Cancer      Relation: Mother          Age of Onset: (Not Specified)  Problem: Depression      Relation: Father          Age of Onset: (Not Specified)  Problem: High Blood Pressure      Relation: Father          Age of Onset: (Not Specified)      Current Outpatient Medications on File Prior to Visit:  rosuvastatin (CRESTOR) 20 MG tablet, Take 1 tablet by mouth nightly, Disp: 30 tablet, Rfl: 5  hydroCHLOROthiazide (HYDRODIURIL) 25 MG tablet, TAKE 1 TABLET BY MOUTH EVERY DAY, Disp: 30 tablet, Rfl: 5  potassium chloride (KLOR-CON M) 20 MEQ extended release tablet, TAKE 1 TABLET BY MOUTH EVERY DAY, Disp: 30 tablet, Rfl: 5  pantoprazole (PROTONIX) 40 MG tablet, TAKE 1 TABLET BY MOUTH TWICE DAILY, Disp: 60 tablet, Rfl: 5  sertraline (ZOLOFT) 100 MG tablet, TAKE 2 TABLETS BY MOUTH EVERY DAY, Disp: 30 tablet, Rfl: 5  ALPRAZolam (XANAX) 0.5 MG tablet, TAKE 1 TABLET BY MOUTH TWICE DAILY AS NEEDED, Disp: , Rfl: 2  JINTELI 1-5 MG-MCG TABS per tablet, TAKE 1 TABLET BY MOUTH EVERY DAY, Disp: , Rfl:   albuterol sulfate  (90 Base) MCG/ACT inhaler, Inhale 2 puffs into the lungs every 4 hours as needed for Wheezing or Shortness of Breath (Space out to every 6 hours as symptoms improve), Disp: 1 each, Rfl: 3  [DISCONTINUED] hydroCHLOROthiazide (HYDRODIURIL) 25 MG tablet, TAKE 1 TABLET BY MOUTH EVERY DAY, Disp: 30 tablet, Rfl: 5  ondansetron (ZOFRAN) 4 MG tablet, TAKE 1 TABLET BY MOUTH EVERY 8 HOURS AS NEEDED FOR NAUSEA / vomiting, Disp: 40 tablet, Rfl: 1  Blood Pressure Monitor KIT, Monitor BP daily. , Disp: 1 kit, Rfl: 0    No current facility-administered medications on file prior to visit.        -- Medrol [Methylprednisolone] -- Itching   -- Other -- Swelling    --  EGG PLANT   -- Pcn [Penicillins] -- Rash      Lab Results       Component                Value               Date                       NA                       142                 10/10/2022                 K                        3.6 (L)             10/10/2022                 CL                       106                 10/10/2022                 CO2                      27                  10/10/2022                 BUN                      9                   10/10/2022                 CREATININE               0.58                10/10/2022                 GLUCOSE                  94                  10/10/2022                 CALCIUM                  9.1                 10/10/2022                 PROT                     7.5                 10/10/2022                 LABALBU                  4.0                 10/10/2022                 BILITOT                  0.5                 10/10/2022                 ALKPHOS                  92                  10/10/2022                 AST                      30                  10/10/2022                 ALT                      19 10/10/2022                 LABGLOM                  >60                 10/10/2022                 GFRAA                    >60                 03/11/2022              Lab Results       Component                Value               Date                       LABA1C                   5.6                 03/11/2022            Lab Results       Component                Value               Date                       EAG                      114                 03/11/2022              Lab Results       Component                Value               Date                       CHOL                     194                 10/10/2022                 CHOL                     199                 03/11/2022                 CHOL                     194                 09/10/2021            Lab Results       Component                Value               Date                       TRIG                     88                  10/10/2022                 TRIG                     114                 03/11/2022                 TRIG                     121                 09/10/2021            Lab Results       Component                Value               Date                       HDL                      53                  10/10/2022                 HDL                      59                  03/11/2022                 HDL                      63                  09/10/2021            Lab Results       Component                Value               Date                       LDLCHOLESTEROL           123                 10/10/2022                 LDLCHOLESTEROL           117                 03/11/2022                 LDLCHOLESTEROL           107                 09/10/2021            Lab Results       Component                Value               Date                       VLDL                     NOT REPORTED        09/10/2021                 VLDL                     NOT REPORTED        12/22/2020                 VLDL NOT REPORTED        02/03/2020            Lab Results       Component                Value               Date                       CHOLHDLRATIO             3.7                 10/10/2022                 CHOLHDLRATIO             3.4                 03/11/2022                 CHOLHDLRATIO             3.1                 09/10/2021                                Hypertension  This is a chronic problem. The current episode started more than 1 year ago. The problem is unchanged. The problem is controlled. Pertinent negatives include no chest pain, neck pain, palpitations or shortness of breath. Past treatments include diuretics. The current treatment provides significant improvement. There are no compliance problems. There is no history of angina. Hyperlipidemia  This is a chronic problem. The current episode started more than 1 year ago. The problem is controlled. Recent lipid tests were reviewed and are normal. Pertinent negatives include no chest pain, myalgias or shortness of breath. Current antihyperlipidemic treatment includes statins. The current treatment provides significant improvement of lipids. There are no compliance problems. Review of Systems   Constitutional: Negative. HENT:  Negative for congestion, ear pain, rhinorrhea, sneezing and sore throat. Eyes:  Negative for visual disturbance. Respiratory:  Negative for cough, chest tightness and shortness of breath. Cardiovascular:  Negative for chest pain and palpitations. Gastrointestinal:  Negative for abdominal pain, blood in stool, constipation, diarrhea and nausea. Genitourinary:  Negative for difficulty urinating, dysuria, frequency, menstrual problem and urgency. Musculoskeletal:  Negative for arthralgias, joint swelling, myalgias and neck pain. Skin: Negative. Neurological:  Negative for syncope. Psychiatric/Behavioral: Negative.           Objective   Physical Exam  Constitutional:       Appearance: She is

## 2022-11-22 ENCOUNTER — OFFICE VISIT (OUTPATIENT)
Dept: FAMILY MEDICINE CLINIC | Age: 52
End: 2022-11-22
Payer: MEDICARE

## 2022-11-22 VITALS
WEIGHT: 250 LBS | BODY MASS INDEX: 46.01 KG/M2 | HEART RATE: 76 BPM | SYSTOLIC BLOOD PRESSURE: 137 MMHG | DIASTOLIC BLOOD PRESSURE: 87 MMHG | HEIGHT: 62 IN

## 2022-11-22 DIAGNOSIS — Z00.00 MEDICARE ANNUAL WELLNESS VISIT, SUBSEQUENT: Primary | ICD-10-CM

## 2022-11-22 PROCEDURE — G8484 FLU IMMUNIZE NO ADMIN: HCPCS | Performed by: INTERNAL MEDICINE

## 2022-11-22 PROCEDURE — 3017F COLORECTAL CA SCREEN DOC REV: CPT | Performed by: INTERNAL MEDICINE

## 2022-11-22 PROCEDURE — 3074F SYST BP LT 130 MM HG: CPT | Performed by: INTERNAL MEDICINE

## 2022-11-22 PROCEDURE — 3078F DIAST BP <80 MM HG: CPT | Performed by: INTERNAL MEDICINE

## 2022-11-22 PROCEDURE — G0439 PPPS, SUBSEQ VISIT: HCPCS | Performed by: INTERNAL MEDICINE

## 2022-11-22 ASSESSMENT — PATIENT HEALTH QUESTIONNAIRE - PHQ9
4. FEELING TIRED OR HAVING LITTLE ENERGY: 1
8. MOVING OR SPEAKING SO SLOWLY THAT OTHER PEOPLE COULD HAVE NOTICED. OR THE OPPOSITE, BEING SO FIGETY OR RESTLESS THAT YOU HAVE BEEN MOVING AROUND A LOT MORE THAN USUAL: 0
7. TROUBLE CONCENTRATING ON THINGS, SUCH AS READING THE NEWSPAPER OR WATCHING TELEVISION: 0
6. FEELING BAD ABOUT YOURSELF - OR THAT YOU ARE A FAILURE OR HAVE LET YOURSELF OR YOUR FAMILY DOWN: 0
1. LITTLE INTEREST OR PLEASURE IN DOING THINGS: 0
SUM OF ALL RESPONSES TO PHQ QUESTIONS 1-9: 4
SUM OF ALL RESPONSES TO PHQ QUESTIONS 1-9: 4
3. TROUBLE FALLING OR STAYING ASLEEP: 3
2. FEELING DOWN, DEPRESSED OR HOPELESS: 0
SUM OF ALL RESPONSES TO PHQ QUESTIONS 1-9: 4
9. THOUGHTS THAT YOU WOULD BE BETTER OFF DEAD, OR OF HURTING YOURSELF: 0
SUM OF ALL RESPONSES TO PHQ QUESTIONS 1-9: 4
SUM OF ALL RESPONSES TO PHQ9 QUESTIONS 1 & 2: 0
10. IF YOU CHECKED OFF ANY PROBLEMS, HOW DIFFICULT HAVE THESE PROBLEMS MADE IT FOR YOU TO DO YOUR WORK, TAKE CARE OF THINGS AT HOME, OR GET ALONG WITH OTHER PEOPLE: 0
5. POOR APPETITE OR OVEREATING: 0

## 2022-11-22 ASSESSMENT — LIFESTYLE VARIABLES
HOW MANY STANDARD DRINKS CONTAINING ALCOHOL DO YOU HAVE ON A TYPICAL DAY: PATIENT DOES NOT DRINK
HOW OFTEN DO YOU HAVE A DRINK CONTAINING ALCOHOL: NEVER

## 2022-11-22 NOTE — PATIENT INSTRUCTIONS
Personalized Preventive Plan for Rahel Mems - 11/22/2022  Medicare offers a range of preventive health benefits. Some of the tests and screenings are paid in full while other may be subject to a deductible, co-insurance, and/or copay. Some of these benefits include a comprehensive review of your medical history including lifestyle, illnesses that may run in your family, and various assessments and screenings as appropriate. After reviewing your medical record and screening and assessments performed today your provider may have ordered immunizations, labs, imaging, and/or referrals for you. A list of these orders (if applicable) as well as your Preventive Care list are included within your After Visit Summary for your review. Other Preventive Recommendations:    A preventive eye exam performed by an eye specialist is recommended every 1-2 years to screen for glaucoma; cataracts, macular degeneration, and other eye disorders. A preventive dental visit is recommended every 6 months. Try to get at least 150 minutes of exercise per week or 10,000 steps per day on a pedometer . Order or download the FREE \"Exercise & Physical Activity: Your Everyday Guide\" from [employer]. Call 1-752.173.8315 or search [employer] online. You need 3279-3090 mg of calcium and 0505-2497 IU of vitamin D per day. It is possible to meet your calcium requirement with diet alone, but a vitamin D supplement is usually necessary to meet this goal.  When exposed to the sun, use a sunscreen that protects against both UVA and UVB radiation with an SPF of 30 or greater. Reapply every 2 to 3 hours or after sweating, drying off with a towel, or swimming. Always wear a seat belt when traveling in a car. Always wear a helmet when riding a bicycle or motorcycle.

## 2022-11-22 NOTE — PROGRESS NOTES
Medicare Annual Wellness Visit    Rahel Mems is here for Medicare AWV    Assessment & Plan   Medicare annual wellness visit, subsequent    Recommendations for Preventive Services Due: see orders and patient instructions/AVS.  Recommended screening schedule for the next 5-10 years is provided to the patient in written form: see Patient Instructions/AVS.     No follow-ups on file. 1. Medicare annual wellness visit, subsequent  See concerns and discussion below     This encounter was performed under myBoris, Vinod, direct supervision, 11/22/2022. Advance Care Planning     General Advance Care Planning (ACP) Conversation    Date of Conversation: 11/22/2022  Conducted with: Patient with Decision Making Capacity    Healthcare Decision Maker:    Primary Decision Maker: Dagoberto Betancourt - [facility] - [phone]  Click here to complete Healthcare Decision Makers including selection of the Healthcare Decision Maker Relationship (ie \"Primary\"). Today we documented Decision Maker(s) consistent with Legal Next of Kin hierarchy. Content/Action Overview:  No ACP docs, declined ACP referral  Reviewed DNR/DNI and patient elects Full Code (Attempt Resuscitation)  artificial nutrition, ventilation preferences, hospitalization preferences, resuscitation preferences, and Full code  No new ordered needed    Length of Voluntary ACP Conversation in minutes:  <16 minutes (Non-Billable)    Dino Kellogg RN               Subjective       Patient's complete Health Risk Assessment and screening values have been reviewed and are found in [address]. The following problems were reviewed today and where indicated follow up appointments were made and/or referrals ordered.     Positive Risk Factor Screenings with Interventions:             General Health and ACP:  General  In general, how would you say your health is?: Good  In the past 7 days, have you experienced any of the following: New or Increased Pain, New or Increased Fatigue, Loneliness, Social Isolation, Stress or Anger?: No  Do you get the social and emotional support that you need?: Yes  Do you have a Living Will?: (!) No    Advance Directives       Power of Shahida Butcher Will ACP-Advance Directive ACP-Power of     Not on File Not on File Not on File Not on File          General Health Risk Interventions:  No concerns at this time    Health Habits/Nutrition:  Physical Activity: Inactive    Days of Exercise per Week: 0 days    Minutes of Exercise per Session: 0 min     Have you lost any weight without trying in the past 3 months?: No  Body mass index: (!) 45.72  Have you seen the dentist within the past year?: Yes  Health Habits/Nutrition Interventions:  Nutritional issues:  educational materials for healthy, well-balanced diet provided Discussed decreasing drinking pop    Hearing/Vision:  Do you or your family notice any trouble with your hearing that hasn't been managed with hearing aids?: No  Do you have difficulty driving, watching TV, or doing any of your daily activities because of your eyesight?: No  Have you had an eye exam within the past year?: (!) No  No results found. Hearing/Vision Interventions:  Vision concerns:  patient encouraged to make appointment with his/her eye specialist            Objective   Vitals:    11/22/22 1443   Weight: 250 lb (113.4 kg)   Height: 5' 2\" (1.575 m)      Body mass index is 45.73 kg/m². Allergies   Allergen Reactions    Medrol [Methylprednisolone] Itching    Other Swelling     EGG PLANT    Pcn [Penicillins] Rash     Prior to Visit Medications    Medication Sig Taking?  Authorizing Provider   DENISE 1-5 MG-MCG TABS per tablet TAKE 1 TABLET BY MOUTH EVERY DAY  Historical Provider, MD   potassium chloride (KLOR-CON M) 20 MEQ extended release tablet Take 1 tablet by mouth daily  Boris Robles MD   pantoprazole (PROTONIX) 40 MG tablet Take 1 tablet by mouth 2 times daily  Rufus Robles MD   rosuvastatin (CRESTOR) 20 MG tablet Take 1 tablet by mouth nightly  Boris Robles MD   hydroCHLOROthiazide (HYDRODIURIL) 25 MG tablet TAKE 1 TABLET BY MOUTH EVERY DAY  Boris Robles MD   albuterol sulfate  (90 Base) MCG/ACT inhaler Inhale 2 puffs into the lungs every 4 hours as needed for Wheezing or Shortness of Breath (Space out to every 6 hours as symptoms improve)  Boris Robles MD   hydroCHLOROthiazide (HYDRODIURIL) 25 MG tablet TAKE 1 TABLET BY MOUTH EVERY DAY  Boris Robles MD   ondansetron (ZOFRAN) 4 MG tablet TAKE 1 TABLET BY MOUTH EVERY 8 HOURS AS NEEDED FOR NAUSEA / vomiting  Lottie Mcguire, MD   sertraline (ZOLOFT) 100 MG tablet TAKE 2 TABLETS BY MOUTH EVERY DAY  Boris Robles MD   ALPRAZolam (XANAX) 0.5 MG tablet TAKE 1 TABLET BY MOUTH TWICE DAILY AS NEEDED  Historical Provider, MD   Blood Pressure Monitor KIT Monitor BP daily. Ranulfo Pearson MD       [facility] (Including outside providers/suppliers regularly involved in providing care):   Patient Care Team:  Ranulfo Pearson MD as PCP - General (Internal Medicine)  Ranulfo Pearson MD as PCP - [facility] Empaneled Provider  Charles Brown (Psychiatry)  DIOMEDES Blanco - CNP     Reviewed and updated this visit:  Tobacco  Allergies  Meds  Med Hx  Surg Hx  Soc Hx  Fam Hx            I, Marcus Whiteside RN, 11/22/2022, performed the documented evaluation under the direct supervision of the attending physician.

## 2023-01-17 ENCOUNTER — OFFICE VISIT (OUTPATIENT)
Dept: PRIMARY CARE CLINIC | Age: 53
End: 2023-01-17
Payer: MEDICARE

## 2023-01-17 VITALS
BODY MASS INDEX: 45.08 KG/M2 | HEART RATE: 88 BPM | RESPIRATION RATE: 18 BRPM | TEMPERATURE: 97.9 F | WEIGHT: 245 LBS | OXYGEN SATURATION: 99 % | SYSTOLIC BLOOD PRESSURE: 142 MMHG | HEIGHT: 62 IN | DIASTOLIC BLOOD PRESSURE: 80 MMHG

## 2023-01-17 DIAGNOSIS — J10.1 INFLUENZA A: Primary | ICD-10-CM

## 2023-01-17 DIAGNOSIS — R68.89 FLU-LIKE SYMPTOMS: ICD-10-CM

## 2023-01-17 DIAGNOSIS — H10.33 ACUTE BACTERIAL CONJUNCTIVITIS OF BOTH EYES: ICD-10-CM

## 2023-01-17 LAB
INFLUENZA A ANTIBODY: POSITIVE
INFLUENZA B ANTIBODY: NEGATIVE
KIT LOT NO., HCLOLOT: NORMAL
SARS-COV-2, POC: NORMAL
VALID INTERNAL CONTROL, POC: PRESENT
VENDOR AND KIT NAME POC: NORMAL

## 2023-01-17 PROCEDURE — G8417 CALC BMI ABV UP PARAM F/U: HCPCS | Performed by: NURSE PRACTITIONER

## 2023-01-17 PROCEDURE — 99213 OFFICE O/P EST LOW 20 MIN: CPT | Performed by: NURSE PRACTITIONER

## 2023-01-17 PROCEDURE — G8427 DOCREV CUR MEDS BY ELIG CLIN: HCPCS | Performed by: NURSE PRACTITIONER

## 2023-01-17 PROCEDURE — 1036F TOBACCO NON-USER: CPT | Performed by: NURSE PRACTITIONER

## 2023-01-17 PROCEDURE — G8484 FLU IMMUNIZE NO ADMIN: HCPCS | Performed by: NURSE PRACTITIONER

## 2023-01-17 PROCEDURE — 3017F COLORECTAL CA SCREEN DOC REV: CPT | Performed by: NURSE PRACTITIONER

## 2023-01-17 PROCEDURE — 3077F SYST BP >= 140 MM HG: CPT | Performed by: NURSE PRACTITIONER

## 2023-01-17 PROCEDURE — 87804 INFLUENZA ASSAY W/OPTIC: CPT | Performed by: NURSE PRACTITIONER

## 2023-01-17 PROCEDURE — 3079F DIAST BP 80-89 MM HG: CPT | Performed by: NURSE PRACTITIONER

## 2023-01-17 RX ORDER — OFLOXACIN 3 MG/ML
1 SOLUTION/ DROPS OPHTHALMIC 4 TIMES DAILY
Qty: 5 ML | Refills: 0 | Status: SHIPPED | OUTPATIENT
Start: 2023-01-17 | End: 2023-01-27

## 2023-01-17 RX ORDER — OSELTAMIVIR PHOSPHATE 75 MG/1
75 CAPSULE ORAL 2 TIMES DAILY
Qty: 10 CAPSULE | Refills: 0 | Status: SHIPPED | OUTPATIENT
Start: 2023-01-17 | End: 2023-01-22

## 2023-01-17 ASSESSMENT — ENCOUNTER SYMPTOMS
FACIAL SWELLING: 0
EYE PAIN: 0
GASTROINTESTINAL NEGATIVE: 1
PHOTOPHOBIA: 0
SINUS PRESSURE: 0
COUGH: 1
SORE THROAT: 1
EYE REDNESS: 1
RHINORRHEA: 1
EYE ITCHING: 1
SINUS PAIN: 0
EYE DISCHARGE: 1

## 2023-01-17 NOTE — PATIENT INSTRUCTIONS
SURVEY:    You may be receiving a survey from [id number] regarding your visit today. Please complete the survey to enable us to provide the highest quality of care to you and your family. If you cannot score us a very good on any question, please call the office to discuss how we could of made your experience a very good one. Thank you for letting us take care of you today. We hope all your questions were addressed. If a question was overlooked or something else comes to mind after you return home, please contact a member of your Care Team listed below.     Thank you,  Perry Griffin MA      Your Care Team at [address] [facility]  Provider- MANNY Travis  Provider- MANNY Hamlin  [address] [address]  Reception- Bruno Bruno      Walk-in contact numbers:       Phone: [phone]                 Fax: [phone]    [facility] Hours:  Mon-Thurs: 9:00 am - 5:30 pm     Friday: 8:00 am - 12:00 pm           Sat-Sun: CLOSED
no swelling

## 2023-01-17 NOTE — PROGRESS NOTES
Chief Complaint   URI (Started Wednesday or Thursday-right ear pain, cough, Post nasal drip, then yesterday throat was scratchy and eyes red and itchy,  )      History of Present Illness   Source of history provided by: patient.    Liat Nobles is a 52 y.o. old female who has a past medical history of:   Past Medical History:   Diagnosis Date    Anxiety     Asthma     Class 3 obesity due to excess calories without serious comorbidity with body mass index (BMI) of 45.0 to 49.9 in adult 5/15/2018    Crohn's disease (HCC)     Depression     GERD (gastroesophageal reflux disease)     Hyperlipidemia     Hypertension     Wears glasses     Presents to the clinic for evaluation of 4 days of cough, runny nose, postnasal drip with eye irritation.  According to Liat, she began to experience bilateral eye redness and drainage with new onset yesterday.  She reports that the drainage is thick and discolored.  She awakened this morning with matted lashes.  She denies being a contact lens user.  There is been no trauma or foreign body exposure to the eye.  Denies visual disturbances.  ROS   Review of Systems   Constitutional:  Positive for chills.   HENT:  Positive for congestion, rhinorrhea and sore throat. Negative for ear pain, facial swelling, sinus pressure and sinus pain.    Eyes:  Positive for discharge, redness and itching. Negative for photophobia, pain and visual disturbance.   Respiratory:  Positive for cough.    Cardiovascular: Negative.    Gastrointestinal: Negative.      Surgical History:  has a past surgical history that includes  section; Mouth surgery; Cystoscopy (2015); and Cholecystectomy, laparoscopic (9/25/15).  Social History:  reports that she has never smoked. She has never used smokeless tobacco. She reports that she does not drink alcohol and does not use drugs.  Family History: family history includes Cancer in her mother; Depression in her father; Diabetes in her mother; High  Blood Pressure in her father. Allergies: Medrol [methylprednisolone], Other, and Pcn [penicillins]    Physical Exam    VS:  BP (!) 142/80 (Site: Right Upper Arm, Position: Sitting, Cuff Size: Large Adult)   Pulse 88   Temp 97.9 °F (36.6 °C) (Oral)   Resp 18   Ht 5' 2\" (1.575 m)   Wt 245 lb (111.1 kg)   SpO2 99%   BMI 44.81 kg/m²      Constitutional:  Alert, development consistent with age. NAD. HEENT:     Head: Normocephalic. NC/NT,  No maxillary or frontal sinus tenderness on palpation. Eyes:   PERRL bilaterally. No edema to the bilateral upper and lower lids. Moderate erythema noted to the lateral conjunctiva. Sclera are clear bilaterally. No obvious FB, rust ring, or hyphema. EOM intact bilaterally and symmetrical.   Visual acuity is grossly intact. Wears prescription glasses. Ears: TMs pearly grey, no perforations, light reflex sharp, non-bulging. Canals clear, non-erythematous, no lesions. Nose: Nares patent, no lesions. Turbinates pink/red and non-edematous. Oral Cavity: Mucosa moist, pink, and smooth. Tonsils 1+ with no erythema or exudate. Oropharynx pink, no drainage. Neck:  Normal ROM. Supple. No anterior cervical adenopathy noted. Lungs: CTAB without wheezes, rales, or rhonchi. CV:  Regular rate and rhythm, normal heart sounds, without ectopy, gallops, or rubs. Skin:  Normal turgor. Warm, dry, without visible rash. Lymphatic: No lymphangitis or adenopathy noted. Neurological:  Oriented. Motor functions intact. Lab / Imaging Results   (All laboratory and radiology results have been personally reviewed by myself)  Labs:  No results found for this visit on 01/17/23. Imaging: All Radiology results interpreted by Radiologist unless otherwise noted. No results found. Medical Decision Making   Almaz Chinchilla appears non-toxic, in no apparent distress and stable at time of discharge. Assessment/Plan   Sierra Meek was seen today for uri.     Diagnoses and all orders for this visit:    Influenza A  -     oseltamivir (TAMIFLU) 75 MG capsule; Take 1 capsule by mouth 2 times daily for 5 days    Acute bacterial conjunctivitis of both eyes  -     ofloxacin (OCUFLOX) 0.3 % solution; Place 1 drop into both eyes 4 times daily for 10 days    Flu-like symptoms  -     POCT Influenza A/B  -     POC COVID-19      - POC Influenza A positive today. - Tamiflu discussed including administration and side effects.  - Treating conjunctivitis today with ofloxacin ophthalmic drops; administration/ADRs discussed. Patient reports that they are a noncontact lens user. - Encouraged good handwashing, avoid rubbing eyes, washing towels and pillowcases in hot water to prevent infection.  -  Discussed signs of worsening and strict follow-up with ophthalmology in 24 to 48 hours if no improvement. - Symptomatic relief discussed including:   Sudafed 12HR 120mg twice daily (nasal decongestant)  Flonase 1 spray each nostril twice daily (nasal steroid)  Zyrtec 10mg Daily OR Claritin 10mg Daily (antihistamine)   Ibuprofen 3 times a day (anti-inflammatory)  Acetaminophen as labeled (for fever)  Warm tea with 1tbsp honey (soothes the throat)   Increase water intake  Rest  Frequent cough and deep breathing exercises at least once every 2 hours. - F/U with PCP if symptoms persist. ED sooner if symptoms worsen or change. Keshia Dials. Carlee HERCULES-CNP      **This report was transcribed using voice recognition software. Every effort was made to ensure accuracy; however, inadvertent computerized transcription errors may be present.

## 2023-01-23 ENCOUNTER — TELEPHONE (OUTPATIENT)
Dept: FAMILY MEDICINE CLINIC | Age: 53
End: 2023-01-23

## 2023-01-23 DIAGNOSIS — R05.1 ACUTE COUGH: Primary | ICD-10-CM

## 2023-01-23 RX ORDER — GUAIFENESIN AND CODEINE PHOSPHATE 100; 10 MG/5ML; MG/5ML
5 SOLUTION ORAL 3 TIMES DAILY PRN
Qty: 100 ML | Refills: 0 | Status: SHIPPED | OUTPATIENT
Start: 2023-01-23 | End: 2023-01-30

## 2023-01-23 NOTE — TELEPHONE ENCOUNTER
Pt called stating since she has had influenza she has had a terrible cough. She states OTC meds do not help. She states Tessalon pearls have not worked for her in the past for cough. Pt uses [facility]       Last OV: 11/22/2022    Next scheduled apt: 4/13/2023

## 2023-02-19 DIAGNOSIS — E78.2 MIXED HYPERCHOLESTEROLEMIA AND HYPERTRIGLYCERIDEMIA: ICD-10-CM

## 2023-02-20 RX ORDER — ROSUVASTATIN CALCIUM 20 MG/1
20 TABLET, COATED ORAL NIGHTLY
Qty: 30 TABLET | Refills: 5 | Status: SHIPPED | OUTPATIENT
Start: 2023-02-20

## 2023-02-20 NOTE — TELEPHONE ENCOUNTER
Last OV: 11/22/2022    Next scheduled apt: 4/13/2023        Surescripts requesting refill  Medication pending

## 2023-03-22 DIAGNOSIS — R60.0 BILATERAL EDEMA OF LOWER EXTREMITY: ICD-10-CM

## 2023-03-22 DIAGNOSIS — I10 ESSENTIAL HYPERTENSION: ICD-10-CM

## 2023-03-23 RX ORDER — HYDROCHLOROTHIAZIDE 25 MG/1
TABLET ORAL
Qty: 30 TABLET | Refills: 5 | Status: SHIPPED | OUTPATIENT
Start: 2023-03-23

## 2023-04-18 DIAGNOSIS — I10 ESSENTIAL HYPERTENSION: ICD-10-CM

## 2023-04-18 DIAGNOSIS — K21.9 GASTROESOPHAGEAL REFLUX DISEASE WITHOUT ESOPHAGITIS: ICD-10-CM

## 2023-04-18 DIAGNOSIS — R10.13 EPIGASTRIC ABDOMINAL PAIN: ICD-10-CM

## 2023-04-20 RX ORDER — PANTOPRAZOLE SODIUM 40 MG/1
40 TABLET, DELAYED RELEASE ORAL 2 TIMES DAILY
Qty: 60 TABLET | Refills: 2 | Status: SHIPPED | OUTPATIENT
Start: 2023-04-20

## 2023-04-20 RX ORDER — POTASSIUM CHLORIDE 20 MEQ/1
20 TABLET, EXTENDED RELEASE ORAL DAILY
Qty: 30 TABLET | Refills: 2 | Status: SHIPPED | OUTPATIENT
Start: 2023-04-20

## 2023-07-18 DIAGNOSIS — I10 ESSENTIAL HYPERTENSION: ICD-10-CM

## 2023-07-18 DIAGNOSIS — K21.9 GASTROESOPHAGEAL REFLUX DISEASE WITHOUT ESOPHAGITIS: ICD-10-CM

## 2023-07-18 DIAGNOSIS — R10.13 EPIGASTRIC ABDOMINAL PAIN: ICD-10-CM

## 2023-07-18 RX ORDER — POTASSIUM CHLORIDE 20 MEQ/1
20 TABLET, EXTENDED RELEASE ORAL DAILY
Qty: 30 TABLET | Refills: 2 | Status: SHIPPED | OUTPATIENT
Start: 2023-07-18

## 2023-07-18 RX ORDER — PANTOPRAZOLE SODIUM 40 MG/1
TABLET, DELAYED RELEASE ORAL
Qty: 60 TABLET | Refills: 2 | Status: SHIPPED | OUTPATIENT
Start: 2023-07-18

## 2023-07-18 NOTE — TELEPHONE ENCOUNTER
Health Maintenance   Topic Date Due    COVID-19 Vaccine (1) Never done    Hepatitis C screen  Never done    Shingles vaccine (1 of 2) Never done    Breast cancer screen  04/08/2023    Flu vaccine (1) 08/01/2023    Colorectal Cancer Screen  11/04/2023    Annual Wellness Visit (AWV)  11/23/2023    Lipids  04/12/2024    Depression Monitoring  04/13/2024    Cervical cancer screen  05/04/2025    DTaP/Tdap/Td vaccine (2 - Td or Tdap) 05/28/2025    HIV screen  Completed    Hepatitis A vaccine  Aged Out    Hib vaccine  Aged Out    Meningococcal (ACWY) vaccine  Aged Out    Pneumococcal 0-64 years Vaccine  Aged Out    Diabetes screen  Discontinued             (applicable per patient's age: Cancer Screenings, Depression Screening, Fall Risk Screening, Immunizations)    Hemoglobin A1C (%)   Date Value   03/11/2022 5.6   12/22/2020 5.5   02/03/2020 5.4     LDL Cholesterol (mg/dL)   Date Value   04/12/2023 131 (H)     AST (U/L)   Date Value   04/12/2023 26     ALT (U/L)   Date Value   04/12/2023 17     BUN (mg/dL)   Date Value   04/12/2023 12      (goal A1C is < 7)   (goal LDL is <100) need 30-50% reduction from baseline     BP Readings from Last 3 Encounters:   04/13/23 139/70   01/17/23 (!) 142/80   11/22/22 137/87    (goal /80)      All Future Testing planned in [facility]:  Lab Frequency Next Occurrence   Comprehensive Metabolic Panel Once 79/47/0919   Lipid Panel Once 10/13/2023   Hemoglobin A1C Once 10/13/2023       Next Visit Date:  Future Appointments   Date Time Provider [address]   10/23/2023  2:30 PM [facility]"" Shawn Pals   12/5/2023  3:30 PM MD Fransisco Campuzano            Patient Active Problem List:     Mixed hypercholesterolemia and hypertriglyceridemia     Anxiety     Depression     Urinary urgency     Hiatal hernia     Symptomatic cholelithiasis     Renal colic on right side     Gastroesophageal reflux disease without esophagitis     Urethral stenosis     Major depressive

## 2023-08-15 DIAGNOSIS — E78.2 MIXED HYPERCHOLESTEROLEMIA AND HYPERTRIGLYCERIDEMIA: ICD-10-CM

## 2023-08-15 RX ORDER — ROSUVASTATIN CALCIUM 20 MG/1
20 TABLET, COATED ORAL NIGHTLY
Qty: 30 TABLET | Refills: 5 | Status: SHIPPED | OUTPATIENT
Start: 2023-08-15

## 2023-08-15 NOTE — TELEPHONE ENCOUNTER
Health Maintenance   Topic Date Due    COVID-19 Vaccine (1) Never done    Hepatitis C screen  Never done    Shingles vaccine (1 of 2) Never done    Breast cancer screen  04/08/2023    Flu vaccine (1) Never done    Colorectal Cancer Screen  11/04/2023    Annual Wellness Visit (AWV)  11/23/2023    Lipids  04/12/2024    Depression Monitoring  04/13/2024    Cervical cancer screen  05/04/2025    DTaP/Tdap/Td vaccine (2 - Td or Tdap) 05/28/2025    HIV screen  Completed    Hepatitis A vaccine  Aged Out    Hib vaccine  Aged Out    Meningococcal (ACWY) vaccine  Aged Out    Pneumococcal 0-64 years Vaccine  Aged Out    Diabetes screen  Discontinued             (applicable per patient's age: Cancer Screenings, Depression Screening, Fall Risk Screening, Immunizations)    Hemoglobin A1C (%)   Date Value   03/11/2022 5.6   12/22/2020 5.5   02/03/2020 5.4     LDL Cholesterol (mg/dL)   Date Value   04/12/2023 131 (H)     AST (U/L)   Date Value   04/12/2023 26     ALT (U/L)   Date Value   04/12/2023 17     BUN (mg/dL)   Date Value   04/12/2023 12      (goal A1C is < 7)   (goal LDL is <100) need 30-50% reduction from baseline     BP Readings from Last 3 Encounters:   04/13/23 139/70   01/17/23 (!) 142/80   11/22/22 137/87    (goal /80)      All Future Testing planned in [facility]:  Lab Frequency Next Occurrence   Comprehensive Metabolic Panel Once 27/17/8369   Lipid Panel Once 10/13/2023   Hemoglobin A1C Once 10/13/2023       Next Visit Date:  Future Appointments   Date Time Provider [facility] [address]   10/23/2023  2:30 PM [facility] [place]ena [place]   12/5/2023  3:30 PM MD Arlin Ugalde Copper            Patient Active Problem List:     Mixed hypercholesterolemia and hypertriglyceridemia     Anxiety     Depression     Urinary urgency     Hiatal hernia     Symptomatic cholelithiasis     Renal colic on right side     Gastroesophageal reflux disease without esophagitis     Urethral stenosis     Major depressive

## 2023-09-11 ENCOUNTER — TELEPHONE (OUTPATIENT)
Dept: FAMILY MEDICINE CLINIC | Age: 53
End: 2023-09-11

## 2023-09-11 NOTE — TELEPHONE ENCOUNTER
Pt called in stating she has had x 3 -4 days cough, congestion, body aches, runny nose, she thought maybe allergies but she started running a fever last night of 102. It is down to 100 with tylenol. Pt states she has been using OTC meds Tylenol, benadryl and claritan with little relief. Pt was informed most likely viral and to continue to treat symtoms with OTC meds and to stay hydrated. If any other recommendation a return call with be done.      Last OV: 4/13/2023    Next scheduled apt: 10/23/2023

## 2023-09-12 ENCOUNTER — HOSPITAL ENCOUNTER (OUTPATIENT)
Age: 53
Setting detail: SPECIMEN
Discharge: HOME | End: 2023-09-12
Payer: MEDICARE

## 2023-09-12 DIAGNOSIS — Z20.822 SUSPECTED COVID-19 VIRUS INFECTION: ICD-10-CM

## 2023-09-12 DIAGNOSIS — Z20.822 SUSPECTED COVID-19 VIRUS INFECTION: Primary | ICD-10-CM

## 2023-09-12 LAB
SARS-COV-2 RDRP RESP QL NAA+PROBE: DETECTED
SPECIMEN DESCRIPTION: ABNORMAL

## 2023-09-12 PROCEDURE — C9803 HOPD COVID-19 SPEC COLLECT: HCPCS

## 2023-09-12 PROCEDURE — 87635 SARS-COV-2 COVID-19 AMP PRB: CPT

## 2023-09-18 DIAGNOSIS — R60.0 BILATERAL EDEMA OF LOWER EXTREMITY: ICD-10-CM

## 2023-09-18 DIAGNOSIS — I10 ESSENTIAL HYPERTENSION: ICD-10-CM

## 2023-09-19 RX ORDER — HYDROCHLOROTHIAZIDE 25 MG/1
TABLET ORAL
Qty: 30 TABLET | Refills: 5 | Status: SHIPPED | OUTPATIENT
Start: 2023-09-19

## 2023-09-21 ENCOUNTER — TELEPHONE (OUTPATIENT)
Dept: FAMILY MEDICINE CLINIC | Age: 53
End: 2023-09-21

## 2023-09-21 DIAGNOSIS — R05.1 ACUTE COUGH: Primary | ICD-10-CM

## 2023-09-21 RX ORDER — BENZONATATE 100 MG/1
100 CAPSULE ORAL 3 TIMES DAILY PRN
Qty: 30 CAPSULE | Refills: 0 | Status: SHIPPED | OUTPATIENT
Start: 2023-09-21 | End: 2023-10-01

## 2023-09-21 NOTE — TELEPHONE ENCOUNTER
Family is 2 weeks post covid. C/o cough and congestion. Treating on Tussin DM. Asks if could try something different/better for cough.

## 2023-10-18 DIAGNOSIS — I10 ESSENTIAL HYPERTENSION: ICD-10-CM

## 2023-10-18 DIAGNOSIS — R10.13 EPIGASTRIC ABDOMINAL PAIN: ICD-10-CM

## 2023-10-18 DIAGNOSIS — K21.9 GASTROESOPHAGEAL REFLUX DISEASE WITHOUT ESOPHAGITIS: ICD-10-CM

## 2023-10-19 RX ORDER — POTASSIUM CHLORIDE 20 MEQ/1
20 TABLET, EXTENDED RELEASE ORAL DAILY
Qty: 30 TABLET | Refills: 5 | Status: SHIPPED | OUTPATIENT
Start: 2023-10-19

## 2023-10-19 RX ORDER — PANTOPRAZOLE SODIUM 40 MG/1
TABLET, DELAYED RELEASE ORAL
Qty: 60 TABLET | Refills: 5 | Status: SHIPPED | OUTPATIENT
Start: 2023-10-19

## 2023-10-20 ENCOUNTER — HOSPITAL ENCOUNTER (OUTPATIENT)
Age: 53
Discharge: HOME | End: 2023-10-20
Payer: MEDICARE

## 2023-10-20 DIAGNOSIS — I10 ESSENTIAL HYPERTENSION: ICD-10-CM

## 2023-10-20 DIAGNOSIS — R73.9 HYPERGLYCEMIA: ICD-10-CM

## 2023-10-20 DIAGNOSIS — E78.2 MIXED HYPERCHOLESTEROLEMIA AND HYPERTRIGLYCERIDEMIA: ICD-10-CM

## 2023-10-20 LAB
ALBUMIN SERPL-MCNC: 4.2 G/DL (ref 3.5–5.2)
ALP SERPL-CCNC: 103 U/L (ref 35–104)
ALT SERPL-CCNC: 30 U/L (ref 5–33)
ANION GAP SERPL CALCULATED.3IONS-SCNC: 10 MMOL/L (ref 9–17)
AST SERPL-CCNC: 42 U/L
BILIRUB SERPL-MCNC: 0.6 MG/DL (ref 0.3–1.2)
BUN SERPL-MCNC: 11 MG/DL (ref 6–20)
BUN/CREAT SERPL: 16 (ref 9–20)
CALCIUM SERPL-MCNC: 9.4 MG/DL (ref 8.6–10.4)
CHLORIDE SERPL-SCNC: 104 MMOL/L (ref 98–107)
CO2 SERPL-SCNC: 26 MMOL/L (ref 20–31)
CREAT SERPL-MCNC: 0.7 MG/DL (ref 0.5–0.9)
GFR SERPL CREATININE-BSD FRML MDRD: >60 ML/MIN/1.73M2
GLUCOSE SERPL-MCNC: 99 MG/DL (ref 70–99)
PATIENT FASTING?: YES
POTASSIUM SERPL-SCNC: 4.1 MMOL/L (ref 3.7–5.3)
PROT SERPL-MCNC: 8.2 G/DL (ref 6.4–8.3)
SODIUM SERPL-SCNC: 140 MMOL/L (ref 135–144)

## 2023-10-20 PROCEDURE — 80061 LIPID PANEL: CPT

## 2023-10-20 PROCEDURE — 80053 COMPREHEN METABOLIC PANEL: CPT

## 2023-10-20 PROCEDURE — 83036 HEMOGLOBIN GLYCOSYLATED A1C: CPT

## 2023-10-20 PROCEDURE — 36415 COLL VENOUS BLD VENIPUNCTURE: CPT

## 2023-10-21 LAB
CHOLEST SERPL-MCNC: 178 MG/DL
CHOLESTEROL/HDL RATIO: 3.2
EST. AVERAGE GLUCOSE BLD GHB EST-MCNC: 108 MG/DL
HBA1C MFR BLD: 5.4 % (ref 4–6)
HDLC SERPL-MCNC: 55 MG/DL
LDLC SERPL CALC-MCNC: 107 MG/DL (ref 0–130)
TRIGL SERPL-MCNC: 82 MG/DL

## 2023-10-23 ENCOUNTER — OFFICE VISIT (OUTPATIENT)
Dept: FAMILY MEDICINE CLINIC | Age: 53
End: 2023-10-23
Payer: MEDICARE

## 2023-10-23 VITALS
HEIGHT: 62 IN | WEIGHT: 236 LBS | DIASTOLIC BLOOD PRESSURE: 74 MMHG | HEART RATE: 84 BPM | BODY MASS INDEX: 43.43 KG/M2 | SYSTOLIC BLOOD PRESSURE: 130 MMHG

## 2023-10-23 DIAGNOSIS — J45.998 SEASONAL ASTHMA: ICD-10-CM

## 2023-10-23 DIAGNOSIS — F41.8 DEPRESSION WITH ANXIETY: ICD-10-CM

## 2023-10-23 DIAGNOSIS — I10 ESSENTIAL HYPERTENSION: Primary | ICD-10-CM

## 2023-10-23 DIAGNOSIS — E66.01 CLASS 3 SEVERE OBESITY DUE TO EXCESS CALORIES WITHOUT SERIOUS COMORBIDITY WITH BODY MASS INDEX (BMI) OF 45.0 TO 49.9 IN ADULT (HCC): ICD-10-CM

## 2023-10-23 DIAGNOSIS — K21.9 GASTROESOPHAGEAL REFLUX DISEASE WITHOUT ESOPHAGITIS: ICD-10-CM

## 2023-10-23 DIAGNOSIS — E78.2 MIXED HYPERCHOLESTEROLEMIA AND HYPERTRIGLYCERIDEMIA: ICD-10-CM

## 2023-10-23 DIAGNOSIS — R79.89 ELEVATED LFTS: ICD-10-CM

## 2023-10-23 PROCEDURE — 3017F COLORECTAL CA SCREEN DOC REV: CPT | Performed by: INTERNAL MEDICINE

## 2023-10-23 PROCEDURE — 3075F SYST BP GE 130 - 139MM HG: CPT | Performed by: INTERNAL MEDICINE

## 2023-10-23 PROCEDURE — G8484 FLU IMMUNIZE NO ADMIN: HCPCS | Performed by: INTERNAL MEDICINE

## 2023-10-23 PROCEDURE — 3078F DIAST BP <80 MM HG: CPT | Performed by: INTERNAL MEDICINE

## 2023-10-23 PROCEDURE — 99214 OFFICE O/P EST MOD 30 MIN: CPT | Performed by: INTERNAL MEDICINE

## 2023-10-23 PROCEDURE — 1036F TOBACCO NON-USER: CPT | Performed by: INTERNAL MEDICINE

## 2023-10-23 PROCEDURE — G8417 CALC BMI ABV UP PARAM F/U: HCPCS | Performed by: INTERNAL MEDICINE

## 2023-10-23 PROCEDURE — G8427 DOCREV CUR MEDS BY ELIG CLIN: HCPCS | Performed by: INTERNAL MEDICINE

## 2023-10-23 RX ORDER — ALBUTEROL SULFATE 90 UG/1
2 AEROSOL, METERED RESPIRATORY (INHALATION) EVERY 4 HOURS PRN
Qty: 1 EACH | Refills: 3 | Status: SHIPPED | OUTPATIENT
Start: 2023-10-23 | End: 2023-11-22

## 2023-10-23 ASSESSMENT — ENCOUNTER SYMPTOMS
SHORTNESS OF BREATH: 0
CONSTIPATION: 0
DIARRHEA: 0
COUGH: 0
RHINORRHEA: 0
NAUSEA: 0
SORE THROAT: 0
ABDOMINAL PAIN: 0
CHEST TIGHTNESS: 0
BLOOD IN STOOL: 0

## 2023-10-23 NOTE — PROGRESS NOTES
Nader Mckinnon (:  1970) is a 48 y.o. female,Established patient, here for evaluation of the following chief complaint(s):  Hypertension (6 month check up. Discuss labs. ), Hyperlipidemia, Gastroesophageal Reflux, and Mental Health Problem (Depression with anxiety. Follows with Dr Carrie Mead. )         ASSESSMENT/PLAN:  1. Essential hypertension  -     Comprehensive Metabolic Panel; Future  2. Elevated LFTs  -     Hepatic Function Panel; Future  3. Mixed hypercholesterolemia and hypertriglyceridemia  -     Comprehensive Metabolic Panel; Future  -     Lipid Panel; Future  4. Gastroesophageal reflux disease without esophagitis  5. Class 3 severe obesity due to excess calories without serious comorbidity with body mass index (BMI) of 45.0 to 49.9 in adult ([address] )  6. Depression with anxiety  7. Seasonal asthma  -     albuterol sulfate HFA (PROVENTIL;VENTOLIN;PROAIR) 108 (90 Base) MCG/ACT inhaler; Inhale 2 puffs into the lungs every 4 hours as needed for Wheezing or Shortness of Breath (Space out to every 6 hours as symptoms improve), Disp-1 each, R-3Normal      Plan:  1. Elevated LFTs  Likely from recent COVID infection. Will repeat in 1 month to make sure this is not worsening.  - Hepatic Function Panel; Future    2. Mixed hypercholesterolemia and hypertriglyceridemia  Controlled on Crestor. No change in dose. Obtain labs approximately one week prior to the office appointment. Please fast for 12 hours prior to obtaining the labs. Water or black coffee (no cream or sugar) is allowed prior to the the labs. - Comprehensive Metabolic Panel; Future  - Lipid Panel; Future    3. Gastroesophageal reflux disease without esophagitis  Controlled on Protonix. No change in dose. 4. Class 3 severe obesity due to excess calories without serious comorbidity with body mass index (BMI) of 45.0 to 49.9 in adult [facility])  Continue to work on wt loss / exercise. 5. Essential hypertension  Controlled on HCTZ.   No change

## 2023-10-23 NOTE — PATIENT INSTRUCTIONS
Survey: You may be receiving a survey from [employer] regarding your visit today. You may get this in the mail, through your MyChart or in your email. Please complete the survey to enable us to provide the highest quality of care to you and your family. Please also, mention our names. If you cannot score us as very good (5 Stars) on any question, please feel free to call the office to discuss how we could have made your experience exceptional.      Thank You! MD Annamarie Arauz, [address] [facility], [phone], APRN ROSALIE    Al Davalos, [address]     Plan:  1. Elevated LFTs  Likely from recent COVID infection. Will repeat in 1 month to make sure this is not worsening.  - Hepatic Function Panel; Future    2. Mixed hypercholesterolemia and hypertriglyceridemia  Controlled on Crestor. No change in dose. Obtain labs approximately one week prior to the office appointment. Please fast for 12 hours prior to obtaining the labs. Water or black coffee (no cream or sugar) is allowed prior to the the labs. - Comprehensive Metabolic Panel; Future  - Lipid Panel; Future    3. Gastroesophageal reflux disease without esophagitis  Controlled on Protonix. No change in dose. 4. Class 3 severe obesity due to excess calories without serious comorbidity with body mass index (BMI) of 45.0 to 49.9 in adult [facility])  Continue to work on wt loss / exercise. 5. Essential hypertension  Controlled on HCTZ. No change in medication.    - Comprehensive Metabolic Panel; Future    6. Depression with anxiety  Follows with Psychiatry. No change in medication. Bladimir Robertscal was instructed to follow up in the clinic in 6 months for check up or as needed with any medical issues.

## 2023-12-04 ENCOUNTER — HOSPITAL ENCOUNTER (OUTPATIENT)
Age: 53
Discharge: HOME | End: 2023-12-04
Payer: MEDICARE

## 2023-12-04 DIAGNOSIS — R79.89 ELEVATED LFTS: ICD-10-CM

## 2023-12-04 LAB
ALBUMIN SERPL-MCNC: 4 G/DL (ref 3.5–5.2)
ALP SERPL-CCNC: 84 U/L (ref 35–104)
ALT SERPL-CCNC: 21 U/L (ref 5–33)
AST SERPL-CCNC: 31 U/L
BILIRUB DIRECT SERPL-MCNC: <0.1 MG/DL
BILIRUB INDIRECT SERPL-MCNC: NORMAL MG/DL (ref 0–1)
BILIRUB SERPL-MCNC: 0.5 MG/DL (ref 0.3–1.2)
PROT SERPL-MCNC: 7.3 G/DL (ref 6.4–8.3)

## 2023-12-04 PROCEDURE — 36415 COLL VENOUS BLD VENIPUNCTURE: CPT

## 2023-12-04 PROCEDURE — 80076 HEPATIC FUNCTION PANEL: CPT

## 2023-12-05 ENCOUNTER — OFFICE VISIT (OUTPATIENT)
Dept: FAMILY MEDICINE CLINIC | Age: 53
End: 2023-12-05
Payer: MEDICARE

## 2023-12-05 VITALS
DIASTOLIC BLOOD PRESSURE: 70 MMHG | WEIGHT: 235 LBS | BODY MASS INDEX: 43.24 KG/M2 | HEIGHT: 62 IN | HEART RATE: 68 BPM | SYSTOLIC BLOOD PRESSURE: 139 MMHG

## 2023-12-05 DIAGNOSIS — Z00.00 MEDICARE ANNUAL WELLNESS VISIT, SUBSEQUENT: Primary | ICD-10-CM

## 2023-12-05 PROCEDURE — 3017F COLORECTAL CA SCREEN DOC REV: CPT | Performed by: INTERNAL MEDICINE

## 2023-12-05 PROCEDURE — G0439 PPPS, SUBSEQ VISIT: HCPCS | Performed by: INTERNAL MEDICINE

## 2023-12-05 PROCEDURE — 3075F SYST BP GE 130 - 139MM HG: CPT | Performed by: INTERNAL MEDICINE

## 2023-12-05 PROCEDURE — G8484 FLU IMMUNIZE NO ADMIN: HCPCS | Performed by: INTERNAL MEDICINE

## 2023-12-05 PROCEDURE — 3078F DIAST BP <80 MM HG: CPT | Performed by: INTERNAL MEDICINE

## 2023-12-05 SDOH — HEALTH STABILITY: PHYSICAL HEALTH: ON AVERAGE, HOW MANY MINUTES DO YOU ENGAGE IN EXERCISE AT THIS LEVEL?: 10 MIN

## 2023-12-05 SDOH — HEALTH STABILITY: PHYSICAL HEALTH: ON AVERAGE, HOW MANY DAYS PER WEEK DO YOU ENGAGE IN MODERATE TO STRENUOUS EXERCISE (LIKE A BRISK WALK)?: 4 DAYS

## 2023-12-05 ASSESSMENT — PATIENT HEALTH QUESTIONNAIRE - PHQ9
SUM OF ALL RESPONSES TO PHQ QUESTIONS 1-9: 1
SUM OF ALL RESPONSES TO PHQ9 QUESTIONS 1 & 2: 0
SUM OF ALL RESPONSES TO PHQ QUESTIONS 1-9: 1
8. MOVING OR SPEAKING SO SLOWLY THAT OTHER PEOPLE COULD HAVE NOTICED. OR THE OPPOSITE, BEING SO FIGETY OR RESTLESS THAT YOU HAVE BEEN MOVING AROUND A LOT MORE THAN USUAL: 0
5. POOR APPETITE OR OVEREATING: 0
3. TROUBLE FALLING OR STAYING ASLEEP: 1
9. THOUGHTS THAT YOU WOULD BE BETTER OFF DEAD, OR OF HURTING YOURSELF: 0
SUM OF ALL RESPONSES TO PHQ QUESTIONS 1-9: 1
7. TROUBLE CONCENTRATING ON THINGS, SUCH AS READING THE NEWSPAPER OR WATCHING TELEVISION: 0
1. LITTLE INTEREST OR PLEASURE IN DOING THINGS: 0
SUM OF ALL RESPONSES TO PHQ QUESTIONS 1-9: 1
4. FEELING TIRED OR HAVING LITTLE ENERGY: 0
10. IF YOU CHECKED OFF ANY PROBLEMS, HOW DIFFICULT HAVE THESE PROBLEMS MADE IT FOR YOU TO DO YOUR WORK, TAKE CARE OF THINGS AT HOME, OR GET ALONG WITH OTHER PEOPLE: 0
2. FEELING DOWN, DEPRESSED OR HOPELESS: 0
6. FEELING BAD ABOUT YOURSELF - OR THAT YOU ARE A FAILURE OR HAVE LET YOURSELF OR YOUR FAMILY DOWN: 0

## 2023-12-05 ASSESSMENT — LIFESTYLE VARIABLES
HOW MANY STANDARD DRINKS CONTAINING ALCOHOL DO YOU HAVE ON A TYPICAL DAY: 0
HOW OFTEN DO YOU HAVE A DRINK CONTAINING ALCOHOL: 1
HOW OFTEN DO YOU HAVE A DRINK CONTAINING ALCOHOL: NEVER
HOW MANY STANDARD DRINKS CONTAINING ALCOHOL DO YOU HAVE ON A TYPICAL DAY: PATIENT DOES NOT DRINK
HOW OFTEN DO YOU HAVE SIX OR MORE DRINKS ON ONE OCCASION: 1

## 2023-12-05 ASSESSMENT — COLUMBIA-SUICIDE SEVERITY RATING SCALE - C-SSRS
4. HAVE YOU HAD THESE THOUGHTS AND HAD SOME INTENTION OF ACTING ON THEM?: NO
5. HAVE YOU STARTED TO WORK OUT OR WORKED OUT THE DETAILS OF HOW TO KILL YOURSELF? DO YOU INTEND TO CARRY OUT THIS PLAN?: NO
3. HAVE YOU BEEN THINKING ABOUT HOW YOU MIGHT KILL YOURSELF?: NO
7. DID THIS OCCUR IN THE LAST THREE MONTHS: NO

## 2023-12-05 NOTE — PROGRESS NOTES
Medicare Annual Wellness Visit    Eliza Raphael is here for No chief complaint on file. Assessment & Plan   Medicare annual wellness visit, subsequent  Recommendations for Preventive Services Due: see orders and patient instructions/AVS.  Recommended screening schedule for the next 5-10 years is provided to the patient in written form: see Patient Instructions/AVS.        Diagnosis Orders   1. Medicare annual wellness visit, subsequent               1. Medicare annual wellness visit, subsequent  See concerns and discussion below     Return in about 1 year (around 12/5/2024) for AWV. This encounter was performed under myBoris, Mario, direct supervision, 12/5/2023. Advance Care Planning     General Advance Care Planning (ACP) Conversation    Date of Conversation: 12/5/2023  Conducted with: Patient with Decision Making Capacity    Healthcare Decision Maker:    Primary Decision Maker: Stephenie Coronado - [facility] - [phone]  Click here to complete Healthcare Decision Makers including selection of the Healthcare Decision Maker Relationship (ie \"Primary\"). Today we documented Decision Maker(s) consistent with Legal Next of Kin hierarchy. Content/Action Overview:  Recommended ACP planning at [facility], she will discuss with her  and call back if she wants to do  Reviewed DNR/DNI and patient elects Full Code (Attempt Resuscitation)  Elects to remain full code  No new ordered needed    Length of Voluntary ACP Conversation in minutes:  <16 minutes (Non-Billable)    Jc Burt RN               Subjective       Patient's complete Health Risk Assessment and screening values have been reviewed and are found in [address] The following problems were reviewed today and where indicated follow up appointments were made and/or referrals ordered.     Positive Risk Factor Screenings with Interventions:               General HRA Questions:  Select all that apply: (!) Stress    Stress

## 2023-12-05 NOTE — PATIENT INSTRUCTIONS
Survey: You may be receiving a survey from [employer] regarding your visit today. You may get this in the mail, through your MyChart or in your email. Please complete the survey to enable us to provide the highest quality of care to you and your family. Please also, mention our names. If you cannot score us as very good (5 Stars) on any question, please feel free to call the office to discuss how we could have made your experience exceptional.      Thank You! MD Annamarie Arauz, Ghanshyam Olson APRN CNP Laurann Dames, MA          Learning About Stress  What is stress? Stress is your body's response to a hard situation. Your body can have a physical, emotional, or mental response. Stress is a fact of life for most people, and it affects everyone differently. What causes stress for you may not be stressful for someone else. A lot of things can cause stress. You may feel stress when you go on a job interview, take a test, or run a race. This kind of short-term stress is normal and even useful. It can help you if you need to work hard or react quickly. For example, stress can help you finish an important job on time. Long-term stress is caused by ongoing stressful situations or events. Examples of long-term stress include long-term health problems, ongoing problems at work, or conflicts in your family. Long-term stress can harm your health. How does stress affect your health? When you are stressed, your body responds as though you are in danger. It makes hormones that speed up your heart, make you breathe faster, and give you a burst of energy. This is called the fight-or-flight stress response. If the stress is over quickly, your body goes back to normal and no harm is done. But if stress happens too often or lasts too long, it can have bad effects. Long-term stress can make you more likely to get sick, and it can make symptoms of some diseases worse.  If you

## 2024-02-14 DIAGNOSIS — E78.2 MIXED HYPERCHOLESTEROLEMIA AND HYPERTRIGLYCERIDEMIA: ICD-10-CM

## 2024-02-14 RX ORDER — ROSUVASTATIN CALCIUM 20 MG/1
20 TABLET, COATED ORAL NIGHTLY
Qty: 30 TABLET | Refills: 5 | Status: SHIPPED | OUTPATIENT
Start: 2024-02-14

## 2024-03-14 DIAGNOSIS — I10 ESSENTIAL HYPERTENSION: ICD-10-CM

## 2024-03-14 DIAGNOSIS — R60.0 BILATERAL EDEMA OF LOWER EXTREMITY: ICD-10-CM

## 2024-03-15 RX ORDER — HYDROCHLOROTHIAZIDE 25 MG/1
TABLET ORAL
Qty: 30 TABLET | Refills: 5 | Status: SHIPPED | OUTPATIENT
Start: 2024-03-15

## 2024-04-13 DIAGNOSIS — K21.9 GASTROESOPHAGEAL REFLUX DISEASE WITHOUT ESOPHAGITIS: ICD-10-CM

## 2024-04-13 DIAGNOSIS — R10.13 EPIGASTRIC ABDOMINAL PAIN: ICD-10-CM

## 2024-04-13 DIAGNOSIS — I10 ESSENTIAL HYPERTENSION: ICD-10-CM

## 2024-04-15 RX ORDER — PANTOPRAZOLE SODIUM 40 MG/1
TABLET, DELAYED RELEASE ORAL
Qty: 60 TABLET | Refills: 5 | Status: SHIPPED | OUTPATIENT
Start: 2024-04-15

## 2024-04-15 RX ORDER — POTASSIUM CHLORIDE 20 MEQ/1
20 TABLET, EXTENDED RELEASE ORAL DAILY
Qty: 30 TABLET | Refills: 5 | Status: SHIPPED | OUTPATIENT
Start: 2024-04-15

## 2024-06-23 ENCOUNTER — HOSPITAL ENCOUNTER (OUTPATIENT)
Age: 54
Discharge: HOME | End: 2024-06-23
Payer: MEDICARE

## 2024-06-23 DIAGNOSIS — E78.2 MIXED HYPERCHOLESTEROLEMIA AND HYPERTRIGLYCERIDEMIA: ICD-10-CM

## 2024-06-23 DIAGNOSIS — I10 ESSENTIAL HYPERTENSION: ICD-10-CM

## 2024-06-23 LAB
ALBUMIN SERPL-MCNC: 3.9 G/DL (ref 3.5–5.2)
ALBUMIN SERPL-MCNC: 4 G/DL (ref 3.5–5.2)
ALP SERPL-CCNC: 86 U/L (ref 35–104)
ALP SERPL-CCNC: 88 U/L (ref 35–104)
ALT SERPL-CCNC: 13 U/L (ref 5–33)
ALT SERPL-CCNC: 13 U/L (ref 5–33)
ANION GAP SERPL CALCULATED.3IONS-SCNC: 9 MMOL/L (ref 9–17)
AST SERPL-CCNC: 23 U/L
AST SERPL-CCNC: 23 U/L
BILIRUB DIRECT SERPL-MCNC: <0.1 MG/DL
BILIRUB INDIRECT SERPL-MCNC: NORMAL MG/DL (ref 0–1)
BILIRUB SERPL-MCNC: 0.6 MG/DL (ref 0.3–1.2)
BILIRUB SERPL-MCNC: 0.6 MG/DL (ref 0.3–1.2)
BUN SERPL-MCNC: 10 MG/DL (ref 6–20)
BUN/CREAT SERPL: 13 (ref 9–20)
CALCIUM SERPL-MCNC: 9.2 MG/DL (ref 8.6–10.4)
CHLORIDE SERPL-SCNC: 105 MMOL/L (ref 98–107)
CHOLEST SERPL-MCNC: 206 MG/DL (ref 0–199)
CHOLESTEROL/HDL RATIO: 3
CO2 SERPL-SCNC: 27 MMOL/L (ref 20–31)
CREAT SERPL-MCNC: 0.8 MG/DL (ref 0.5–0.9)
GFR, ESTIMATED: 88 ML/MIN/1.73M2
GLUCOSE SERPL-MCNC: 96 MG/DL (ref 70–99)
HDLC SERPL-MCNC: 59 MG/DL
LDLC SERPL CALC-MCNC: 128 MG/DL (ref 0–100)
POTASSIUM SERPL-SCNC: 4.5 MMOL/L (ref 3.7–5.3)
PROT SERPL-MCNC: 7.6 G/DL (ref 6.4–8.3)
PROT SERPL-MCNC: 7.7 G/DL (ref 6.4–8.3)
SODIUM SERPL-SCNC: 141 MMOL/L (ref 135–144)
TRIGL SERPL-MCNC: 92 MG/DL
VLDLC SERPL CALC-MCNC: 18 MG/DL

## 2024-06-23 PROCEDURE — 80053 COMPREHEN METABOLIC PANEL: CPT

## 2024-06-23 PROCEDURE — 80061 LIPID PANEL: CPT

## 2024-06-23 PROCEDURE — 36415 COLL VENOUS BLD VENIPUNCTURE: CPT

## 2024-06-23 PROCEDURE — 80076 HEPATIC FUNCTION PANEL: CPT

## 2024-06-24 ENCOUNTER — OFFICE VISIT (OUTPATIENT)
Dept: FAMILY MEDICINE CLINIC | Age: 54
End: 2024-06-24
Payer: MEDICARE

## 2024-06-24 VITALS
HEART RATE: 74 BPM | DIASTOLIC BLOOD PRESSURE: 78 MMHG | BODY MASS INDEX: 44.37 KG/M2 | HEIGHT: 61 IN | SYSTOLIC BLOOD PRESSURE: 136 MMHG | WEIGHT: 235 LBS

## 2024-06-24 DIAGNOSIS — E66.01 OBESITY, CLASS III, BMI 40-49.9 (MORBID OBESITY) (HCC): ICD-10-CM

## 2024-06-24 DIAGNOSIS — Z12.11 COLON CANCER SCREENING: ICD-10-CM

## 2024-06-24 DIAGNOSIS — E78.2 MIXED HYPERCHOLESTEROLEMIA AND HYPERTRIGLYCERIDEMIA: ICD-10-CM

## 2024-06-24 DIAGNOSIS — J45.998 SEASONAL ASTHMA: ICD-10-CM

## 2024-06-24 DIAGNOSIS — R10.13 EPIGASTRIC ABDOMINAL PAIN: ICD-10-CM

## 2024-06-24 DIAGNOSIS — K21.9 GASTROESOPHAGEAL REFLUX DISEASE WITHOUT ESOPHAGITIS: ICD-10-CM

## 2024-06-24 DIAGNOSIS — F32.5 MAJOR DEPRESSIVE DISORDER WITH SINGLE EPISODE, IN FULL REMISSION (HCC): ICD-10-CM

## 2024-06-24 DIAGNOSIS — Z12.31 OTHER SCREENING MAMMOGRAM: ICD-10-CM

## 2024-06-24 DIAGNOSIS — I10 ESSENTIAL HYPERTENSION: Primary | ICD-10-CM

## 2024-06-24 PROCEDURE — 3078F DIAST BP <80 MM HG: CPT | Performed by: INTERNAL MEDICINE

## 2024-06-24 PROCEDURE — 3075F SYST BP GE 130 - 139MM HG: CPT | Performed by: INTERNAL MEDICINE

## 2024-06-24 PROCEDURE — 99214 OFFICE O/P EST MOD 30 MIN: CPT | Performed by: INTERNAL MEDICINE

## 2024-06-24 RX ORDER — PANTOPRAZOLE SODIUM 40 MG/1
40 TABLET, DELAYED RELEASE ORAL DAILY
Qty: 30 TABLET | Refills: 5 | Status: SHIPPED | OUTPATIENT
Start: 2024-06-24

## 2024-06-24 RX ORDER — ALBUTEROL SULFATE 90 UG/1
2 AEROSOL, METERED RESPIRATORY (INHALATION) EVERY 4 HOURS PRN
Qty: 1 EACH | Refills: 3 | Status: SHIPPED | OUTPATIENT
Start: 2024-06-24 | End: 2024-07-24

## 2024-06-24 SDOH — ECONOMIC STABILITY: INCOME INSECURITY: HOW HARD IS IT FOR YOU TO PAY FOR THE VERY BASICS LIKE FOOD, HOUSING, MEDICAL CARE, AND HEATING?: NOT VERY HARD

## 2024-06-24 SDOH — ECONOMIC STABILITY: FOOD INSECURITY: WITHIN THE PAST 12 MONTHS, THE FOOD YOU BOUGHT JUST DIDN'T LAST AND YOU DIDN'T HAVE MONEY TO GET MORE.: NEVER TRUE

## 2024-06-24 SDOH — ECONOMIC STABILITY: HOUSING INSECURITY
IN THE LAST 12 MONTHS, WAS THERE A TIME WHEN YOU DID NOT HAVE A STEADY PLACE TO SLEEP OR SLEPT IN A SHELTER (INCLUDING NOW)?: NO

## 2024-06-24 SDOH — ECONOMIC STABILITY: FOOD INSECURITY: WITHIN THE PAST 12 MONTHS, YOU WORRIED THAT YOUR FOOD WOULD RUN OUT BEFORE YOU GOT MONEY TO BUY MORE.: NEVER TRUE

## 2024-06-24 ASSESSMENT — ENCOUNTER SYMPTOMS
CONSTIPATION: 0
SORE THROAT: 0
RHINORRHEA: 0
DIARRHEA: 0
SHORTNESS OF BREATH: 0
BLOOD IN STOOL: 0
CHEST TIGHTNESS: 0
NAUSEA: 0
COUGH: 0
ABDOMINAL PAIN: 0

## 2024-06-24 ASSESSMENT — PATIENT HEALTH QUESTIONNAIRE - PHQ9
10. IF YOU CHECKED OFF ANY PROBLEMS, HOW DIFFICULT HAVE THESE PROBLEMS MADE IT FOR YOU TO DO YOUR WORK, TAKE CARE OF THINGS AT HOME, OR GET ALONG WITH OTHER PEOPLE: NOT DIFFICULT AT ALL
5. POOR APPETITE OR OVEREATING: SEVERAL DAYS
SUM OF ALL RESPONSES TO PHQ QUESTIONS 1-9: 5
SUM OF ALL RESPONSES TO PHQ9 QUESTIONS 1 & 2: 2
9. THOUGHTS THAT YOU WOULD BE BETTER OFF DEAD, OR OF HURTING YOURSELF: NOT AT ALL
SUM OF ALL RESPONSES TO PHQ QUESTIONS 1-9: 5
3. TROUBLE FALLING OR STAYING ASLEEP: SEVERAL DAYS
7. TROUBLE CONCENTRATING ON THINGS, SUCH AS READING THE NEWSPAPER OR WATCHING TELEVISION: NOT AT ALL
4. FEELING TIRED OR HAVING LITTLE ENERGY: SEVERAL DAYS
SUM OF ALL RESPONSES TO PHQ QUESTIONS 1-9: 5
SUM OF ALL RESPONSES TO PHQ QUESTIONS 1-9: 5
6. FEELING BAD ABOUT YOURSELF - OR THAT YOU ARE A FAILURE OR HAVE LET YOURSELF OR YOUR FAMILY DOWN: NOT AT ALL
1. LITTLE INTEREST OR PLEASURE IN DOING THINGS: SEVERAL DAYS
8. MOVING OR SPEAKING SO SLOWLY THAT OTHER PEOPLE COULD HAVE NOTICED. OR THE OPPOSITE, BEING SO FIGETY OR RESTLESS THAT YOU HAVE BEEN MOVING AROUND A LOT MORE THAN USUAL: NOT AT ALL
2. FEELING DOWN, DEPRESSED OR HOPELESS: SEVERAL DAYS

## 2024-06-24 NOTE — PATIENT INSTRUCTIONS
Survey:     You may be receiving a survey from [facility] [employer] regarding your visit today.     You may get this in the mail, through your MyChart or in your email.      Please complete the survey to enable us to provide the highest quality of care to you and your family. Please also, mention our names.     If you cannot score us as very good (5 Stars) on any question, please feel free to call the office to discuss how we could have made your experience exceptional.      Thank You!        Dr. Robles, MD Oneil, JOSE Chávez, JANICE Rodrigues, DIOMEDES Juares, JANICE Ruiz, JANICE       Plan:  1. Other screening mammogram  Set up for screening mammogram.    - MARCIN NADYA DIGITAL SCREEN BILATERAL; Future    2. Colon cancer screening  Cologuard Rx given.    - Fecal DNA Colorectal cancer screening (Cologuard)    3. Seasonal asthma  Uses Proventil as needed (usually only uses once every 2 months).    - albuterol sulfate HFA (PROVENTIL;VENTOLIN;PROAIR) 108 (90 Base) MCG/ACT inhaler; Inhale 2 puffs into the lungs every 4 hours as needed for Wheezing or Shortness of Breath (Space out to every 6 hours as symptoms improve)  Dispense: 1 each; Refill: 3    4. Major depressive disorder with single episode, in full remission (HCC)  Symptoms fluctuate depending on situations.    Follows with Dr. Hua.      5. Essential hypertension  Controlled on HCTZ.  No change in dose.    6. Obesity, Class III, BMI 40-49.9 (morbid obesity) (HCC)  Continue to work on wt loss / exercise (20 minutes of walking 4 days a week).    7. Gastroesophageal reflux disease without esophagitis  Symptoms fluctuate depending on eating late at night.  Recommend not eating 4 hours before bedtime.     8. Mixed hypercholesterolemia and hypertriglyceridemia  Continue on Crestor with a low cholesterol diet.  Obtain labs approximately one week prior to the office appointment.  Please fast for 12 hours prior to obtaining the labs.  Water or black coffee (no cream or sugar)

## 2024-06-24 NOTE — PROGRESS NOTES
(BMI) of 45.0 to 49.9 in adult  No date: Crohn's disease (HCC)  No date: Depression  No date: GERD (gastroesophageal reflux disease)  No date: Hyperlipidemia  No date: Hypertension  No date: Wears glasses    Past Surgical History:  No date:  SECTION  9/25/15: CHOLECYSTECTOMY, LAPAROSCOPIC  2015: CYSTOSCOPY  No date: MOUTH SURGERY    Social History    Socioeconomic History      Marital status:       Spouse name: Not on file      Number of children: Not on file      Years of education: Not on file      Highest education level: Not on file    Occupational History        Employer: UNEMPLOYED    Tobacco Use      Smoking status: Never      Smokeless tobacco: Never    Substance and Sexual Activity      Alcohol use: No        Comment: occassional      Drug use: No      Sexual activity: Yes        Partners: Male    Other Topics      Concerns:        Not on file    Social History Narrative      Not on file    Social Determinants of Health  Financial Resource Strain: Low Risk  (2024)      Overall Financial Resource Strain (CARDIA)          Difficulty of Paying Living Expenses: Not very hard  Food Insecurity: No Food Insecurity (2024)      Hunger Vital Sign          Worried About Running Out of Food in the Last Year: Never true          Ran Out of Food in the Last Year: Never true  Transportation Needs: Unknown (2024)      PRAPARE - Transportation          Lack of Transportation (Medical): Not on file          Lack of Transportation (Non-Medical): No  Physical Activity: Insufficiently Active (2023)      Exercise Vital Sign          Days of Exercise per Week: 4 days          Minutes of Exercise per Session: 10 min  Stress: Not on file  Social Connections: Not on file  Intimate Partner Violence: Not on file  Housing Stability: Unknown (2024)      Housing Stability Vital Sign          Unable to Pay for Housing in the Last Year: Not on file          Number of Places Lived in the Last Year:

## 2024-06-26 ENCOUNTER — HOSPITAL ENCOUNTER (OUTPATIENT)
Dept: MAMMOGRAPHY | Age: 54
Discharge: HOME | End: 2024-06-28
Attending: INTERNAL MEDICINE
Payer: MEDICARE

## 2024-06-26 DIAGNOSIS — Z12.31 OTHER SCREENING MAMMOGRAM: ICD-10-CM

## 2024-06-26 PROCEDURE — 77067 SCR MAMMO BI INCL CAD: CPT

## 2024-07-09 LAB — NONINV COLON CA DNA+OCC BLD SCRN STL QL: NEGATIVE

## 2024-08-09 DIAGNOSIS — R60.0 BILATERAL EDEMA OF LOWER EXTREMITY: ICD-10-CM

## 2024-08-09 DIAGNOSIS — E78.2 MIXED HYPERCHOLESTEROLEMIA AND HYPERTRIGLYCERIDEMIA: ICD-10-CM

## 2024-08-09 DIAGNOSIS — I10 ESSENTIAL HYPERTENSION: ICD-10-CM

## 2024-08-09 DIAGNOSIS — R10.13 EPIGASTRIC ABDOMINAL PAIN: ICD-10-CM

## 2024-08-09 DIAGNOSIS — K21.9 GASTROESOPHAGEAL REFLUX DISEASE WITHOUT ESOPHAGITIS: ICD-10-CM

## 2024-08-09 RX ORDER — HYDROCHLOROTHIAZIDE 25 MG/1
25 TABLET ORAL DAILY
Qty: 90 TABLET | Refills: 1 | Status: SHIPPED | OUTPATIENT
Start: 2024-08-09

## 2024-08-09 RX ORDER — PANTOPRAZOLE SODIUM 40 MG/1
40 TABLET, DELAYED RELEASE ORAL DAILY
Qty: 90 TABLET | Refills: 1 | Status: SHIPPED | OUTPATIENT
Start: 2024-08-09

## 2024-08-09 RX ORDER — SERTRALINE HYDROCHLORIDE 100 MG/1
TABLET, FILM COATED ORAL
Qty: 90 TABLET | Refills: 1 | Status: SHIPPED | OUTPATIENT
Start: 2024-08-09

## 2024-08-09 RX ORDER — POTASSIUM CHLORIDE 20 MEQ/1
20 TABLET, EXTENDED RELEASE ORAL DAILY
Qty: 90 TABLET | Refills: 1 | Status: SHIPPED | OUTPATIENT
Start: 2024-08-09

## 2024-08-09 RX ORDER — ROSUVASTATIN CALCIUM 20 MG/1
20 TABLET, COATED ORAL NIGHTLY
Qty: 90 TABLET | Refills: 1 | Status: SHIPPED | OUTPATIENT
Start: 2024-08-09

## 2024-08-09 NOTE — TELEPHONE ENCOUNTER
Eneida knight Emery called requesting a 90 day supply of medications for the patient be sent to [facility]).

## 2025-02-24 ENCOUNTER — HOSPITAL ENCOUNTER (OUTPATIENT)
Age: 55
Discharge: HOME | End: 2025-02-24
Payer: MEDICARE

## 2025-02-24 DIAGNOSIS — E78.2 MIXED HYPERCHOLESTEROLEMIA AND HYPERTRIGLYCERIDEMIA: ICD-10-CM

## 2025-02-24 DIAGNOSIS — I10 ESSENTIAL HYPERTENSION: ICD-10-CM

## 2025-02-24 LAB
ALBUMIN SERPL-MCNC: 4.1 G/DL (ref 3.5–5.2)
ALBUMIN/GLOB SERPL: 1.1 {RATIO} (ref 1–2.5)
ALP SERPL-CCNC: 104 U/L (ref 35–104)
ALT SERPL-CCNC: 28 U/L (ref 5–33)
ANION GAP SERPL CALCULATED.3IONS-SCNC: 11 MMOL/L (ref 9–17)
AST SERPL-CCNC: 44 U/L
BILIRUB SERPL-MCNC: 0.6 MG/DL (ref 0.3–1.2)
BUN SERPL-MCNC: 9 MG/DL (ref 6–20)
CALCIUM SERPL-MCNC: 9.6 MG/DL (ref 8.6–10.4)
CHLORIDE SERPL-SCNC: 103 MMOL/L (ref 98–107)
CHOLEST SERPL-MCNC: 181 MG/DL (ref 0–199)
CHOLESTEROL/HDL RATIO: 3.6
CO2 SERPL-SCNC: 27 MMOL/L (ref 20–31)
CREAT SERPL-MCNC: 0.7 MG/DL (ref 0.5–0.9)
GFR, ESTIMATED: >90 ML/MIN/1.73M2
GLUCOSE SERPL-MCNC: 97 MG/DL (ref 70–99)
HDLC SERPL-MCNC: 50 MG/DL
LDLC SERPL CALC-MCNC: 115 MG/DL (ref 0–100)
POTASSIUM SERPL-SCNC: 4.1 MMOL/L (ref 3.7–5.3)
PROT SERPL-MCNC: 7.7 G/DL (ref 6.4–8.3)
SODIUM SERPL-SCNC: 141 MMOL/L (ref 135–144)
TRIGL SERPL-MCNC: 78 MG/DL
VLDLC SERPL CALC-MCNC: 16 MG/DL (ref 1–30)

## 2025-02-24 PROCEDURE — 80061 LIPID PANEL: CPT

## 2025-02-24 PROCEDURE — 36415 COLL VENOUS BLD VENIPUNCTURE: CPT

## 2025-02-24 PROCEDURE — 80053 COMPREHEN METABOLIC PANEL: CPT

## 2025-02-24 SDOH — ECONOMIC STABILITY: FOOD INSECURITY: WITHIN THE PAST 12 MONTHS, THE FOOD YOU BOUGHT JUST DIDN'T LAST AND YOU DIDN'T HAVE MONEY TO GET MORE.: NEVER TRUE

## 2025-02-24 SDOH — ECONOMIC STABILITY: INCOME INSECURITY: IN THE LAST 12 MONTHS, WAS THERE A TIME WHEN YOU WERE NOT ABLE TO PAY THE MORTGAGE OR RENT ON TIME?: NO

## 2025-02-24 SDOH — ECONOMIC STABILITY: FOOD INSECURITY: WITHIN THE PAST 12 MONTHS, YOU WORRIED THAT YOUR FOOD WOULD RUN OUT BEFORE YOU GOT MONEY TO BUY MORE.: NEVER TRUE

## 2025-02-24 SDOH — HEALTH STABILITY: PHYSICAL HEALTH: ON AVERAGE, HOW MANY DAYS PER WEEK DO YOU ENGAGE IN MODERATE TO STRENUOUS EXERCISE (LIKE A BRISK WALK)?: 5 DAYS

## 2025-02-24 SDOH — HEALTH STABILITY: PHYSICAL HEALTH: ON AVERAGE, HOW MANY MINUTES DO YOU ENGAGE IN EXERCISE AT THIS LEVEL?: 10 MIN

## 2025-02-24 ASSESSMENT — PATIENT HEALTH QUESTIONNAIRE - PHQ9
4. FEELING TIRED OR HAVING LITTLE ENERGY: SEVERAL DAYS
9. THOUGHTS THAT YOU WOULD BE BETTER OFF DEAD, OR OF HURTING YOURSELF: NOT AT ALL
SUM OF ALL RESPONSES TO PHQ QUESTIONS 1-9: 5
3. TROUBLE FALLING OR STAYING ASLEEP: SEVERAL DAYS
1. LITTLE INTEREST OR PLEASURE IN DOING THINGS: SEVERAL DAYS
7. TROUBLE CONCENTRATING ON THINGS, SUCH AS READING THE NEWSPAPER OR WATCHING TELEVISION: NOT AT ALL
SUM OF ALL RESPONSES TO PHQ QUESTIONS 1-9: 5
5. POOR APPETITE OR OVEREATING: SEVERAL DAYS
2. FEELING DOWN, DEPRESSED OR HOPELESS: SEVERAL DAYS
8. MOVING OR SPEAKING SO SLOWLY THAT OTHER PEOPLE COULD HAVE NOTICED. OR THE OPPOSITE, BEING SO FIGETY OR RESTLESS THAT YOU HAVE BEEN MOVING AROUND A LOT MORE THAN USUAL: NOT AT ALL
SUM OF ALL RESPONSES TO PHQ9 QUESTIONS 1 & 2: 2
SUM OF ALL RESPONSES TO PHQ QUESTIONS 1-9: 5
10. IF YOU CHECKED OFF ANY PROBLEMS, HOW DIFFICULT HAVE THESE PROBLEMS MADE IT FOR YOU TO DO YOUR WORK, TAKE CARE OF THINGS AT HOME, OR GET ALONG WITH OTHER PEOPLE: NOT DIFFICULT AT ALL
6. FEELING BAD ABOUT YOURSELF - OR THAT YOU ARE A FAILURE OR HAVE LET YOURSELF OR YOUR FAMILY DOWN: NOT AT ALL
SUM OF ALL RESPONSES TO PHQ QUESTIONS 1-9: 5

## 2025-02-24 ASSESSMENT — LIFESTYLE VARIABLES
HOW MANY STANDARD DRINKS CONTAINING ALCOHOL DO YOU HAVE ON A TYPICAL DAY: 0
HOW OFTEN DO YOU HAVE A DRINK CONTAINING ALCOHOL: NEVER
HOW OFTEN DO YOU HAVE SIX OR MORE DRINKS ON ONE OCCASION: 1
HOW MANY STANDARD DRINKS CONTAINING ALCOHOL DO YOU HAVE ON A TYPICAL DAY: PATIENT DOES NOT DRINK
HOW OFTEN DO YOU HAVE A DRINK CONTAINING ALCOHOL: 1

## 2025-02-25 ENCOUNTER — OFFICE VISIT (OUTPATIENT)
Dept: FAMILY MEDICINE CLINIC | Age: 55
End: 2025-02-25

## 2025-02-25 VITALS
BODY MASS INDEX: 42.88 KG/M2 | HEART RATE: 70 BPM | WEIGHT: 233 LBS | DIASTOLIC BLOOD PRESSURE: 88 MMHG | HEIGHT: 62 IN | SYSTOLIC BLOOD PRESSURE: 148 MMHG

## 2025-02-25 DIAGNOSIS — E78.2 MIXED HYPERCHOLESTEROLEMIA AND HYPERTRIGLYCERIDEMIA: ICD-10-CM

## 2025-02-25 DIAGNOSIS — F41.8 DEPRESSION WITH ANXIETY: ICD-10-CM

## 2025-02-25 DIAGNOSIS — Z00.00 MEDICARE ANNUAL WELLNESS VISIT, SUBSEQUENT: Primary | ICD-10-CM

## 2025-02-25 DIAGNOSIS — I10 ESSENTIAL HYPERTENSION: ICD-10-CM

## 2025-02-25 RX ORDER — SERTRALINE HYDROCHLORIDE 100 MG/1
150 TABLET, FILM COATED ORAL DAILY
Qty: 90 TABLET | Refills: 1
Start: 2025-02-25

## 2025-02-25 NOTE — PATIENT INSTRUCTIONS
Survey:     You may be receiving a survey from [employer] regarding your visit today.     You may get this in the mail, through your MyChart or in your email.      Please complete the survey to enable us to provide the highest quality of care to you and your family. Please also, mention our names.     If you cannot score us as very good (5 Stars) on any question, please feel free to call the office to discuss how we could have made your experience exceptional.      Thank You!        Dr. Robles, MD Oneil, JOSE Chávez, JANICE Rodrigues, DIOMEDES Juares, JANICE Cedillo, JANICE          Learning About Mindfulness for Stress  What are mindfulness and stress?     Stress is your body's response to a hard situation. Your body can have a physical, emotional, or mental response. A lot of things can cause stress. You may feel stress when you go on a job interview, take a test, or run a race. This kind of short-term stress is normal and even useful. It can help you if you need to work hard or react quickly.  Stress also can last a long time. Long-term stress is caused by stressful situations or events. Examples of long-term stress include long-term health problems, ongoing problems at work, and conflicts in your family. Long-term stress can harm your health.  Mindfulness is a focus only on things happening in the present moment. It's a process of purposefully paying attention to and being aware of your surroundings, your emotions, your thoughts, and how your body feels. You are aware of these things, but you aren't judging these experiences as \"good\" or \"bad.\" Mindfulness can help you learn to calm your mind and body to help you cope with illness, pain, and stress.  How does mindfulness help to relieve stress?  Mindfulness can help quiet your mind and relax your body. Studies show that it can help some people sleep better, feel less anxious, and bring their blood pressure down. And it's been shown to help some people live and

## 2025-02-28 DIAGNOSIS — E78.2 MIXED HYPERCHOLESTEROLEMIA AND HYPERTRIGLYCERIDEMIA: ICD-10-CM

## 2025-02-28 RX ORDER — ROSUVASTATIN CALCIUM 20 MG/1
20 TABLET, COATED ORAL NIGHTLY
Qty: 90 TABLET | Refills: 1 | Status: SHIPPED | OUTPATIENT
Start: 2025-02-28

## 2025-02-28 NOTE — TELEPHONE ENCOUNTER
Last OV 2/25/25     Next OV 8/26/25    Requesting refill on rosuvastatin thru [facility]  Rx pending

## 2025-03-31 DIAGNOSIS — R60.0 BILATERAL EDEMA OF LOWER EXTREMITY: ICD-10-CM

## 2025-03-31 DIAGNOSIS — I10 ESSENTIAL HYPERTENSION: ICD-10-CM

## 2025-03-31 RX ORDER — HYDROCHLOROTHIAZIDE 25 MG/1
25 TABLET ORAL DAILY
Qty: 90 TABLET | Refills: 1 | Status: SHIPPED | OUTPATIENT
Start: 2025-03-31

## 2025-03-31 NOTE — TELEPHONE ENCOUNTER
Last OV 2/25/25     Next OV 8/26/25    Requesting refill on hydrochlorothiazide thru [facility]  Rx pending

## 2025-04-10 ENCOUNTER — TELEPHONE (OUTPATIENT)
Dept: FAMILY MEDICINE CLINIC | Age: 55
End: 2025-04-10

## 2025-04-10 NOTE — TELEPHONE ENCOUNTER
Pt still has a slight cough. Is asking if you could send something in for her. She does have some congestion but its clear. The cough is worse at night. No other symptoms. She states she tried Robitussin but that made it worse.

## 2025-04-11 ENCOUNTER — OFFICE VISIT (OUTPATIENT)
Dept: FAMILY MEDICINE CLINIC | Age: 55
End: 2025-04-11
Payer: MEDICARE

## 2025-04-11 VITALS
HEART RATE: 72 BPM | DIASTOLIC BLOOD PRESSURE: 78 MMHG | HEIGHT: 62 IN | SYSTOLIC BLOOD PRESSURE: 136 MMHG | BODY MASS INDEX: 42.33 KG/M2 | WEIGHT: 230 LBS

## 2025-04-11 DIAGNOSIS — J40 BRONCHITIS: Primary | ICD-10-CM

## 2025-04-11 PROCEDURE — 99213 OFFICE O/P EST LOW 20 MIN: CPT | Performed by: INTERNAL MEDICINE

## 2025-04-11 PROCEDURE — 3075F SYST BP GE 130 - 139MM HG: CPT | Performed by: INTERNAL MEDICINE

## 2025-04-11 PROCEDURE — 3078F DIAST BP <80 MM HG: CPT | Performed by: INTERNAL MEDICINE

## 2025-04-11 RX ORDER — AZITHROMYCIN 250 MG/1
TABLET, FILM COATED ORAL
Qty: 1 PACKET | Refills: 0 | Status: SHIPPED | OUTPATIENT
Start: 2025-04-11 | End: 2025-04-21

## 2025-04-11 ASSESSMENT — ENCOUNTER SYMPTOMS
NAUSEA: 0
CHEST TIGHTNESS: 0
SORE THROAT: 0
ABDOMINAL PAIN: 0
RHINORRHEA: 1
BLOOD IN STOOL: 0
DIARRHEA: 0
COUGH: 1
CONSTIPATION: 0
SHORTNESS OF BREATH: 0

## 2025-04-11 NOTE — PROGRESS NOTES
Liat Nobles (:  1970) is a 54 y.o. female,Established patient, here for evaluation of the following chief complaint(s):  Cough (Ongoing since her last appt with you . Has been off and on, worse at night. )         Assessment & Plan  Bronchitis   With the length of time she has been having symptoms I am going to treat with a Zithromax (Zpak).  Consider trying over the counter Zyrtec 10 mg nightly if symptoms continue.     Orders:    azithromycin (ZITHROMAX Z-DON) 250 MG tablet; Two tablets by mouth the first day  then one tablet daily for 4 days.    Liat is instructed to return to the clinic if the symptoms continue or worsen.  Liat  was also instructed to go to the emergency room department if the symptoms significantly worsen before an appointment can be made.           Kathi Fariady states she has had a cough since the end of February.  Recently, the cough has become productive.  Cough is worse in the afternoon.  No fever or chills.  She denies having SOB. Using Tussin DM and inhaler with no improvement.  On occasion she well get runny / congested nose.  No ear ache or sore throat.         Review of Systems   Constitutional: Negative.    HENT:  Positive for congestion and rhinorrhea. Negative for ear pain, sneezing and sore throat.    Eyes:  Negative for visual disturbance.   Respiratory:  Positive for cough. Negative for chest tightness and shortness of breath.    Cardiovascular:  Negative for chest pain and palpitations.   Gastrointestinal:  Negative for abdominal pain, blood in stool, constipation, diarrhea and nausea.   Genitourinary:  Negative for difficulty urinating, dysuria, frequency, menstrual problem and urgency.   Musculoskeletal:  Negative for arthralgias, joint swelling, myalgias and neck pain.   Skin: Negative.    Neurological:  Negative for syncope.   Psychiatric/Behavioral: Negative.            Objective   Physical Exam  Constitutional:       Appearance: She is

## 2025-04-11 NOTE — PATIENT INSTRUCTIONS
Assessment & Plan  Bronchitis   With the length of time she has been having symptoms I am going to treat with a Zithromax (Zpak).  Consider trying over the counter Zyrtec 10 mg nightly if symptoms continue.     Orders:    azithromycin (ZITHROMAX Z-DON) 250 MG tablet; Two tablets by mouth the first day  then one tablet daily for 4 days.    Liat is instructed to return to the clinic if the symptoms continue or worsen.  Liat  was also instructed to go to the emergency room department if the symptoms significantly worsen before an appointment can be made.

## 2025-04-30 DIAGNOSIS — I10 ESSENTIAL HYPERTENSION: ICD-10-CM

## 2025-04-30 RX ORDER — POTASSIUM CHLORIDE 1500 MG/1
20 TABLET, EXTENDED RELEASE ORAL DAILY
Qty: 90 TABLET | Refills: 1 | Status: SHIPPED | OUTPATIENT
Start: 2025-04-30

## 2025-05-07 ENCOUNTER — TELEPHONE (OUTPATIENT)
Dept: FAMILY MEDICINE CLINIC | Age: 55
End: 2025-05-07

## 2025-05-07 NOTE — TELEPHONE ENCOUNTER
Pt called stating she is experiencing bilateral hand numbness. Left greater than the right, Thumb and first finger primarily. She believes it may be due to the Crestor. She states she had similar symptoms with her feet when Crestor was started. She also questioned if getting the name brand vs the generic would make a difference?  Appt offered patient declined.     Last OV: 4/11/2025  Last RX:    Next scheduled apt: 8/26/2025

## 2025-06-01 DIAGNOSIS — R10.13 EPIGASTRIC ABDOMINAL PAIN: ICD-10-CM

## 2025-06-01 DIAGNOSIS — K21.9 GASTROESOPHAGEAL REFLUX DISEASE WITHOUT ESOPHAGITIS: ICD-10-CM

## 2025-06-02 ENCOUNTER — TELEPHONE (OUTPATIENT)
Dept: FAMILY MEDICINE CLINIC | Age: 55
End: 2025-06-02

## 2025-06-02 DIAGNOSIS — R20.0 HAND NUMBNESS: Primary | ICD-10-CM

## 2025-06-02 RX ORDER — PANTOPRAZOLE SODIUM 40 MG/1
40 TABLET, DELAYED RELEASE ORAL DAILY
Qty: 90 TABLET | Refills: 1 | Status: SHIPPED | OUTPATIENT
Start: 2025-06-02

## 2025-06-02 NOTE — TELEPHONE ENCOUNTER
Awaiting more schedule availability for Beatrice.       Last OV: 4/11/2025  Last RX:    Next scheduled apt: 8/26/2025

## 2025-06-02 NOTE — TELEPHONE ENCOUNTER
Patient wondering if lab work for Potassium, magnesium, calcium can be ordered. Wondering if this has something to do with the hand numbness she is having    Please advise

## 2025-06-03 ENCOUNTER — HOSPITAL ENCOUNTER (OUTPATIENT)
Age: 55
Discharge: HOME | End: 2025-06-03
Payer: MEDICARE

## 2025-06-03 DIAGNOSIS — E78.2 MIXED HYPERCHOLESTEROLEMIA AND HYPERTRIGLYCERIDEMIA: ICD-10-CM

## 2025-06-03 DIAGNOSIS — R20.0 HAND NUMBNESS: ICD-10-CM

## 2025-06-03 DIAGNOSIS — I10 ESSENTIAL HYPERTENSION: ICD-10-CM

## 2025-06-03 LAB
ALBUMIN SERPL-MCNC: 4.3 G/DL (ref 3.5–5.2)
ALBUMIN/GLOB SERPL: 1.2 {RATIO} (ref 1–2.5)
ALP SERPL-CCNC: 105 U/L (ref 35–104)
ALT SERPL-CCNC: 33 U/L (ref 5–33)
ANION GAP SERPL CALCULATED.3IONS-SCNC: 8 MMOL/L (ref 9–17)
AST SERPL-CCNC: 45 U/L
BILIRUB SERPL-MCNC: 0.6 MG/DL (ref 0.3–1.2)
BUN SERPL-MCNC: 10 MG/DL (ref 6–20)
CALCIUM SERPL-MCNC: 10 MG/DL (ref 8.6–10.4)
CHLORIDE SERPL-SCNC: 103 MMOL/L (ref 98–107)
CHOLEST SERPL-MCNC: 211 MG/DL (ref 0–199)
CHOLESTEROL/HDL RATIO: 3.9
CO2 SERPL-SCNC: 29 MMOL/L (ref 20–31)
CREAT SERPL-MCNC: 0.7 MG/DL (ref 0.5–0.9)
GFR, ESTIMATED: >90 ML/MIN/1.73M2
GLUCOSE SERPL-MCNC: 97 MG/DL (ref 70–99)
HDLC SERPL-MCNC: 54 MG/DL
LDLC SERPL CALC-MCNC: 135 MG/DL (ref 0–100)
MAGNESIUM SERPL-MCNC: 2 MG/DL (ref 1.6–2.6)
POTASSIUM SERPL-SCNC: 4.3 MMOL/L (ref 3.7–5.3)
PROT SERPL-MCNC: 7.8 G/DL (ref 6.4–8.3)
SODIUM SERPL-SCNC: 140 MMOL/L (ref 135–144)
TRIGL SERPL-MCNC: 112 MG/DL
VLDLC SERPL CALC-MCNC: 22 MG/DL (ref 1–30)

## 2025-06-03 PROCEDURE — 80053 COMPREHEN METABOLIC PANEL: CPT

## 2025-06-03 PROCEDURE — 80061 LIPID PANEL: CPT

## 2025-06-03 PROCEDURE — 36415 COLL VENOUS BLD VENIPUNCTURE: CPT

## 2025-06-03 PROCEDURE — 83735 ASSAY OF MAGNESIUM: CPT

## 2025-08-01 ENCOUNTER — OFFICE VISIT (OUTPATIENT)
Dept: FAMILY MEDICINE CLINIC | Age: 55
End: 2025-08-01

## 2025-08-01 VITALS — DIASTOLIC BLOOD PRESSURE: 76 MMHG | SYSTOLIC BLOOD PRESSURE: 138 MMHG | WEIGHT: 251.6 LBS | BODY MASS INDEX: 46.02 KG/M2

## 2025-08-01 DIAGNOSIS — G56.03 BILATERAL CARPAL TUNNEL SYNDROME: ICD-10-CM

## 2025-08-01 DIAGNOSIS — S29.012A STRAIN OF THORACIC BACK REGION: Primary | ICD-10-CM

## 2025-08-01 RX ORDER — MELOXICAM 15 MG/1
15 TABLET ORAL DAILY
Qty: 30 TABLET | Refills: 0 | Status: SHIPPED | OUTPATIENT
Start: 2025-08-01

## 2025-08-01 ASSESSMENT — ENCOUNTER SYMPTOMS
CONSTIPATION: 0
BACK PAIN: 1
COUGH: 0
NAUSEA: 0
CHEST TIGHTNESS: 0
ABDOMINAL PAIN: 0
BLOOD IN STOOL: 0
SORE THROAT: 0
RHINORRHEA: 0
SHORTNESS OF BREATH: 0
DIARRHEA: 0

## 2025-08-01 NOTE — PROGRESS NOTES
Liat Nobles (:  1970) is a 54 y.o. female,Established patient, here for evaluation of the following chief complaint(s):  Back Pain (Patient states she has been having back pain over a week and pain in left under arm also that comes and goes. /Patient states she continues to have issues with hands and arms feeling odd.), Hypertension, New Patient, Gastroesophageal Reflux, and Mental Health Problem         Assessment & Plan  Strain of thoracic back region   Pain is reproducible suggesting a strain of the ngoc lumbar muscle.   Will treat with Meloxicam 15 mg daily with food.  Continue with Tylenol as needed.  Take Zanaflex 4 mg every 8 hours as needed for spasms.          Bilateral carpal tunnel syndrome  Should improve with Meloxicam.     Recommended exercises (printed off).  Avoid positions that may contribute to the numbness.  Consider carpal tunnel splints if symptoms persist.          Liat is instructed to return to the clinic if the symptoms continue or worsen.  Liat  was also instructed to go to the emergency room department if the symptoms significantly worsen before an appointment can be made.           Subjective   Sushma states last week  her mower broke (the self propelled part) and she had to push the mower.  Since that time she has noticed mid back pain that radiates to the right side.  Movement or lifting will make the pain worse.  She is taking Tylenol which helps but the pain returns.  The radiating pain comes and goes.  No fever or chills.  No dysuria.     A few month ago she started noticing her hands feeling tingly.  Recently this has been a little better.  This comes and goes.  It can be one hand at a time or sometimes both.  Strength in the hands are good.   She has noticed it Is always the first 3 fingers.          Review of Systems   Constitutional: Negative.    HENT:  Negative for congestion, ear pain, rhinorrhea, sneezing and sore throat.    Eyes:  Negative for visual

## 2025-08-01 NOTE — PATIENT INSTRUCTIONS
Assessment & Plan  Strain of thoracic back region   Pain is reproducible suggesting a strain of the ngoc lumbar muscle.   Will treat with Meloxicam 15 mg daily with food.  Continue with Tylenol as needed.  Take Zanaflex 4 mg every 8 hours as needed for spasms.          Bilateral carpal tunnel syndrome  Should improve with Meloxicam.     Recommended exercises (printed off).  Avoid positions that may contribute to the numbness.  Consider carpal tunnel splints if symptoms persist.          Liat is instructed to return to the clinic if the symptoms continue or worsen.  Liat  was also instructed to go to the emergency room department if the symptoms significantly worsen before an appointment can be made.

## 2025-08-26 ENCOUNTER — OFFICE VISIT (OUTPATIENT)
Dept: FAMILY MEDICINE CLINIC | Age: 55
End: 2025-08-26

## 2025-08-26 VITALS
WEIGHT: 217.3 LBS | SYSTOLIC BLOOD PRESSURE: 132 MMHG | DIASTOLIC BLOOD PRESSURE: 76 MMHG | BODY MASS INDEX: 39.99 KG/M2 | HEIGHT: 62 IN | HEART RATE: 78 BPM

## 2025-08-26 DIAGNOSIS — J45.998 SEASONAL ASTHMA: ICD-10-CM

## 2025-08-26 DIAGNOSIS — E78.2 MIXED HYPERCHOLESTEROLEMIA AND HYPERTRIGLYCERIDEMIA: Primary | ICD-10-CM

## 2025-08-26 DIAGNOSIS — R73.9 HYPERGLYCEMIA: ICD-10-CM

## 2025-08-26 DIAGNOSIS — I10 ESSENTIAL HYPERTENSION: ICD-10-CM

## 2025-08-26 DIAGNOSIS — E66.813: ICD-10-CM

## 2025-08-26 DIAGNOSIS — F41.8 DEPRESSION WITH ANXIETY: ICD-10-CM

## 2025-08-26 DIAGNOSIS — R01.1 SYSTOLIC MURMUR: ICD-10-CM

## 2025-08-26 RX ORDER — ROSUVASTATIN CALCIUM 40 MG/1
40 TABLET, COATED ORAL DAILY
Qty: 30 TABLET | Refills: 5
Start: 2025-08-26

## 2025-08-26 RX ORDER — ALBUTEROL SULFATE 90 UG/1
2 INHALANT RESPIRATORY (INHALATION) EVERY 4 HOURS PRN
Qty: 1 EACH | Refills: 3 | Status: SHIPPED | OUTPATIENT
Start: 2025-08-26 | End: 2025-09-25

## 2025-08-26 ASSESSMENT — ENCOUNTER SYMPTOMS
ABDOMINAL PAIN: 0
COUGH: 0
CONSTIPATION: 0
SHORTNESS OF BREATH: 0
RHINORRHEA: 0
DIARRHEA: 0
CHEST TIGHTNESS: 0
NAUSEA: 0
BLOOD IN STOOL: 0
SORE THROAT: 0